# Patient Record
Sex: MALE | Race: WHITE | NOT HISPANIC OR LATINO | URBAN - METROPOLITAN AREA
[De-identification: names, ages, dates, MRNs, and addresses within clinical notes are randomized per-mention and may not be internally consistent; named-entity substitution may affect disease eponyms.]

---

## 2017-11-13 ENCOUNTER — OUTPATIENT (OUTPATIENT)
Dept: OUTPATIENT SERVICES | Facility: HOSPITAL | Age: 72
LOS: 1 days | Discharge: HOME | End: 2017-11-13

## 2017-11-13 DIAGNOSIS — E78.00 PURE HYPERCHOLESTEROLEMIA, UNSPECIFIED: ICD-10-CM

## 2017-11-13 DIAGNOSIS — I25.10 ATHEROSCLEROTIC HEART DISEASE OF NATIVE CORONARY ARTERY WITHOUT ANGINA PECTORIS: ICD-10-CM

## 2017-11-13 DIAGNOSIS — E11.21 TYPE 2 DIABETES MELLITUS WITH DIABETIC NEPHROPATHY: ICD-10-CM

## 2017-11-13 DIAGNOSIS — E03.9 HYPOTHYROIDISM, UNSPECIFIED: ICD-10-CM

## 2017-11-13 DIAGNOSIS — K21.9 GASTRO-ESOPHAGEAL REFLUX DISEASE WITHOUT ESOPHAGITIS: ICD-10-CM

## 2017-11-13 DIAGNOSIS — E66.8 OTHER OBESITY: ICD-10-CM

## 2017-11-13 DIAGNOSIS — E78.5 HYPERLIPIDEMIA, UNSPECIFIED: ICD-10-CM

## 2017-11-13 DIAGNOSIS — N40.0 BENIGN PROSTATIC HYPERPLASIA WITHOUT LOWER URINARY TRACT SYMPTOMS: ICD-10-CM

## 2017-11-13 DIAGNOSIS — E55.9 VITAMIN D DEFICIENCY, UNSPECIFIED: ICD-10-CM

## 2017-11-13 DIAGNOSIS — E53.8 DEFICIENCY OF OTHER SPECIFIED B GROUP VITAMINS: ICD-10-CM

## 2017-11-13 DIAGNOSIS — I10 ESSENTIAL (PRIMARY) HYPERTENSION: ICD-10-CM

## 2017-11-13 DIAGNOSIS — E11.9 TYPE 2 DIABETES MELLITUS WITHOUT COMPLICATIONS: ICD-10-CM

## 2017-11-13 DIAGNOSIS — Z95.810 PRESENCE OF AUTOMATIC (IMPLANTABLE) CARDIAC DEFIBRILLATOR: ICD-10-CM

## 2017-11-13 DIAGNOSIS — N18.3 CHRONIC KIDNEY DISEASE, STAGE 3 (MODERATE): ICD-10-CM

## 2017-11-16 ENCOUNTER — APPOINTMENT (OUTPATIENT)
Dept: CARDIOLOGY | Facility: CLINIC | Age: 72
End: 2017-11-16

## 2017-11-16 VITALS — DIASTOLIC BLOOD PRESSURE: 70 MMHG | BODY MASS INDEX: 36.81 KG/M2 | SYSTOLIC BLOOD PRESSURE: 120 MMHG | WEIGHT: 235 LBS

## 2018-05-17 ENCOUNTER — APPOINTMENT (OUTPATIENT)
Dept: CARDIOLOGY | Facility: CLINIC | Age: 73
End: 2018-05-17

## 2018-05-17 VITALS — WEIGHT: 232 LBS | BODY MASS INDEX: 36.34 KG/M2

## 2018-05-17 VITALS — SYSTOLIC BLOOD PRESSURE: 110 MMHG | DIASTOLIC BLOOD PRESSURE: 62 MMHG

## 2018-12-12 ENCOUNTER — OUTPATIENT (OUTPATIENT)
Dept: OUTPATIENT SERVICES | Facility: HOSPITAL | Age: 73
LOS: 1 days | Discharge: HOME | End: 2018-12-12

## 2018-12-13 ENCOUNTER — APPOINTMENT (OUTPATIENT)
Dept: CARDIOLOGY | Facility: CLINIC | Age: 73
End: 2018-12-13

## 2018-12-13 ENCOUNTER — OUTPATIENT (OUTPATIENT)
Dept: OUTPATIENT SERVICES | Facility: HOSPITAL | Age: 73
LOS: 1 days | Discharge: HOME | End: 2018-12-13

## 2018-12-13 VITALS — BODY MASS INDEX: 36.18 KG/M2 | SYSTOLIC BLOOD PRESSURE: 110 MMHG | DIASTOLIC BLOOD PRESSURE: 68 MMHG | WEIGHT: 231 LBS

## 2018-12-13 DIAGNOSIS — E11.9 TYPE 2 DIABETES MELLITUS WITHOUT COMPLICATIONS: ICD-10-CM

## 2018-12-13 DIAGNOSIS — I25.700 ATHEROSCLEROSIS OF CORONARY ARTERY BYPASS GRAFT(S), UNSPECIFIED, WITH UNSTABLE ANGINA PECTORIS: ICD-10-CM

## 2018-12-13 DIAGNOSIS — I10 ESSENTIAL (PRIMARY) HYPERTENSION: ICD-10-CM

## 2018-12-13 DIAGNOSIS — N18.3 CHRONIC KIDNEY DISEASE, STAGE 3 (MODERATE): ICD-10-CM

## 2018-12-13 NOTE — PHYSICAL EXAM
[General Appearance - Well Developed] : well developed [Normal Appearance] : normal appearance [Well Groomed] : well groomed [General Appearance - Well Nourished] : well nourished [No Deformities] : no deformities [General Appearance - In No Acute Distress] : no acute distress [Heart Rate And Rhythm] : heart rate and rhythm were normal [Heart Sounds] : normal S1 and S2 [Murmurs] : no murmurs present [Respiration, Rhythm And Depth] : normal respiratory rhythm and effort [Exaggerated Use Of Accessory Muscles For Inspiration] : no accessory muscle use [Auscultation Breath Sounds / Voice Sounds] : lungs were clear to auscultation bilaterally [Clean] : clean [Dry] : dry [Well-Healed] : well-healed [Abdomen Soft] : soft [Abdomen Tenderness] : non-tender [Abdomen Mass (___ Cm)] : no abdominal mass palpated [Nail Clubbing] : no clubbing of the fingernails [Cyanosis, Localized] : no localized cyanosis [Petechial Hemorrhages (___cm)] : no petechial hemorrhages [] : no ischemic changes

## 2018-12-13 NOTE — HISTORY OF PRESENT ILLNESS
[None] : The patient complains of no symptoms [Palpitations] : no palpitations [SOB] : no dyspnea [Syncope] : no syncope [Dizziness] : no dizziness [Chest Pain] : no chest pain or discomfort [ICD Shocks] : no recent ICD shocks [Erythema at Site] : no erythema at device site [Swelling at Site] : no swelling at device site [de-identified] : Pt complaining of still SOB despite BiV -ICD. Has occasional angina

## 2018-12-13 NOTE — PROCEDURE
[No] : not [NSR] : normal sinus rhythm [CRT-D] : Cardiac resynchronization therapy defibrillator [DDD] : DDD [Charge Time: ___ sec] : charge time was [unfilled] seconds [Longevity: ___ months] : The estimated remaining battery life is [unfilled] months [Threshold Testing Performed] : Threshold testing was performed [Sensing Amplitude ___mv] : sensing amplitude was [unfilled] mv [Lead Imp:  ___ohms] : lead impedance was [unfilled] ohms [___V @] : [unfilled] V [___ ms] : [unfilled] ms [Programmed for Longevity] : output reprogrammed for improved battery longevity [Asense-Vsense ___ %] : Asense-Vsense [unfilled]% [Asense-Vpace ___ %] : Asense-Vpace [unfilled]% [Apace-Vsense ___ %] : Apace-Vsense [unfilled]% [Apace-Vpace ___ %] : Apace-Vpace [unfilled]% [de-identified] : ABBOTT [de-identified] : [id number] [de-identified] : [id number] [de-identified] : 6/02/2016 [de-identified] : 61 [de-identified] : NORMAL BI-V FUNCTION.\par NO NEW EOISODES.

## 2019-01-14 DIAGNOSIS — N39.0 URINARY TRACT INFECTION, SITE NOT SPECIFIED: ICD-10-CM

## 2019-01-14 DIAGNOSIS — E11.9 TYPE 2 DIABETES MELLITUS WITHOUT COMPLICATIONS: ICD-10-CM

## 2019-04-09 ENCOUNTER — APPOINTMENT (OUTPATIENT)
Dept: CARDIOLOGY | Facility: CLINIC | Age: 74
End: 2019-04-09
Payer: MEDICARE

## 2019-04-09 PROCEDURE — 93296 REM INTERROG EVL PM/IDS: CPT

## 2019-04-09 PROCEDURE — 93295 DEV INTERROG REMOTE 1/2/MLT: CPT

## 2019-07-10 ENCOUNTER — APPOINTMENT (OUTPATIENT)
Dept: CARDIOLOGY | Facility: CLINIC | Age: 74
End: 2019-07-10
Payer: MEDICARE

## 2019-07-10 PROCEDURE — 93296 REM INTERROG EVL PM/IDS: CPT

## 2019-07-10 PROCEDURE — 93295 DEV INTERROG REMOTE 1/2/MLT: CPT

## 2019-10-08 ENCOUNTER — APPOINTMENT (OUTPATIENT)
Dept: CARDIOLOGY | Facility: CLINIC | Age: 74
End: 2019-10-08
Payer: MEDICARE

## 2019-10-08 PROCEDURE — 93295 DEV INTERROG REMOTE 1/2/MLT: CPT

## 2019-10-08 PROCEDURE — 93296 REM INTERROG EVL PM/IDS: CPT

## 2019-10-24 ENCOUNTER — APPOINTMENT (OUTPATIENT)
Dept: CARDIOLOGY | Facility: CLINIC | Age: 74
End: 2019-10-24
Payer: MEDICARE

## 2019-10-24 VITALS
BODY MASS INDEX: 36.81 KG/M2 | WEIGHT: 235 LBS | HEART RATE: 71 BPM | DIASTOLIC BLOOD PRESSURE: 69 MMHG | SYSTOLIC BLOOD PRESSURE: 122 MMHG

## 2019-10-24 DIAGNOSIS — E78.00 PURE HYPERCHOLESTEROLEMIA, UNSPECIFIED: ICD-10-CM

## 2019-10-24 PROCEDURE — 93284 PRGRMG EVAL IMPLANTABLE DFB: CPT

## 2019-10-24 PROCEDURE — 99213 OFFICE O/P EST LOW 20 MIN: CPT | Mod: 25

## 2019-10-24 NOTE — PROCEDURE
[No] : not [NSR] : normal sinus rhythm [CRT-D] : Cardiac resynchronization therapy defibrillator [DDD] : DDD [Voltage: ___ volts] : Voltage was [unfilled] volts [Charge Time: ___ sec] : charge time was [unfilled] seconds [Longevity: ___ months] : The estimated remaining battery life is [unfilled] months [Normal] : The battery status is normal. [Threshold Testing Performed] : Threshold testing was performed [Atrial] : Atrial [Ventricular] : Ventricular [Sensing Amplitude ___mv] : sensing amplitude was [unfilled] mv [Lead Imp:  ___ohms] : lead impedance was [unfilled] ohms [___V @] : [unfilled] V [___ ms] : [unfilled] ms [Asense-Vsense ___ %] : Asense-Vsense [unfilled]% [Asense-Vpace ___ %] : Asense-Vpace [unfilled]% [Apace-Vsense ___ %] : Apace-Vsense [unfilled]% [Apace-Vpace ___ %] : Apace-Vpace [unfilled]% [See Scanned Paceart Report] : See scanned paceart report [See Device Printout] : See device printout [Programmed for Longevity] : output reprogrammed for improved battery longevity [de-identified] : ABBOTT / FAUSTINO [de-identified] : [id number] [de-identified] : [id number] [de-identified] : 6/2/2016 [de-identified] : ATRIAL TACHYCARDIA: 10 NON-SUSTAINED EPISODES LONGEST 8 SECONDS, FASTEST <200/MIN. \par NO VENTRICULAR ARRHYTHMIAS.  [de-identified] : 60

## 2019-10-24 NOTE — PHYSICAL EXAM
[General Appearance - Well Developed] : well developed [Normal Appearance] : normal appearance [General Appearance - Well Nourished] : well nourished [No Deformities] : no deformities [Heart Sounds] : normal S1 and S2 [Arterial Pulses Normal] : the arterial pulses were normal [Respiration, Rhythm And Depth] : normal respiratory rhythm and effort [Auscultation Breath Sounds / Voice Sounds] : lungs were clear to auscultation bilaterally [Left Infraclavicular] : left infraclavicular area [Well Groomed] : well groomed [General Appearance - In No Acute Distress] : no acute distress [Heart Rate And Rhythm] : heart rate and rhythm were normal [Murmurs] : no murmurs present [Exaggerated Use Of Accessory Muscles For Inspiration] : no accessory muscle use [Clean] : clean [Dry] : dry [Well-Healed] : well-healed [Abdomen Soft] : soft [Abdomen Tenderness] : non-tender [Abdomen Mass (___ Cm)] : no abdominal mass palpated [Nail Clubbing] : no clubbing of the fingernails [Cyanosis, Localized] : no localized cyanosis [Petechial Hemorrhages (___cm)] : no petechial hemorrhages [] : no ischemic changes

## 2019-10-24 NOTE — HISTORY OF PRESENT ILLNESS
[Palpitations] : no palpitations [SOB] : no dyspnea [Syncope] : no syncope [Dizziness] : no dizziness [Chest Pain] : no chest pain or discomfort [ICD Shocks] : no recent ICD shocks [Erythema at Site] : no erythema at device site [Swelling at Site] : no swelling at device site [de-identified] : 73 y/o male with a CRT- D presents for a routine device interrogation. \par \par Pt reports intermittent MEEHAN , activity limited by left knee pain.  Has occasional angina . Occasional dizziness , no syncope . \par \par ECG (10/24/2019) SR at 71 bpm, BiV paced .

## 2019-10-24 NOTE — ASSESSMENT
[FreeTextEntry1] : 73 y/o male with pmh of CAD, s/p MI x 3 , s/p CABG , ICM/ CHF  s/p BiV AICD 2016 , DM , PAF \par \par On Eliquis for CVA prevention , no s/s bleeding reported . \par \par AICD interrogation : All parameters stable , Brief AT/ Afib episodes , BiV paced >99% . Corvue index- no pulmonary congestion \par \par He is enrolled in remote monitoring services . \par \par Plan \par -Continue current medication regimen \par -Continue remote monitoring \par -RTO in 6 months \par -Obtain labs : CBC, BMP \par

## 2020-01-24 ENCOUNTER — APPOINTMENT (OUTPATIENT)
Dept: CARDIOLOGY | Facility: CLINIC | Age: 75
End: 2020-01-24
Payer: MEDICARE

## 2020-01-24 PROCEDURE — 93295 DEV INTERROG REMOTE 1/2/MLT: CPT

## 2020-01-24 PROCEDURE — 93296 REM INTERROG EVL PM/IDS: CPT

## 2020-07-24 ENCOUNTER — APPOINTMENT (OUTPATIENT)
Dept: ELECTROPHYSIOLOGY | Facility: CLINIC | Age: 75
End: 2020-07-24

## 2020-07-24 ENCOUNTER — APPOINTMENT (OUTPATIENT)
Dept: CARDIOLOGY | Facility: CLINIC | Age: 75
End: 2020-07-24
Payer: MEDICARE

## 2020-07-24 PROCEDURE — 93296 REM INTERROG EVL PM/IDS: CPT

## 2020-07-24 PROCEDURE — 93295 DEV INTERROG REMOTE 1/2/MLT: CPT

## 2020-09-08 ENCOUNTER — INPATIENT (INPATIENT)
Facility: HOSPITAL | Age: 75
LOS: 1 days | Discharge: HOME | End: 2020-09-10
Attending: INTERNAL MEDICINE | Admitting: INTERNAL MEDICINE
Payer: MEDICARE

## 2020-09-08 VITALS
HEART RATE: 77 BPM | SYSTOLIC BLOOD PRESSURE: 172 MMHG | RESPIRATION RATE: 18 BRPM | TEMPERATURE: 98 F | DIASTOLIC BLOOD PRESSURE: 79 MMHG

## 2020-09-08 DIAGNOSIS — Z95.1 PRESENCE OF AORTOCORONARY BYPASS GRAFT: Chronic | ICD-10-CM

## 2020-09-08 DIAGNOSIS — Z95.810 PRESENCE OF AUTOMATIC (IMPLANTABLE) CARDIAC DEFIBRILLATOR: Chronic | ICD-10-CM

## 2020-09-08 DIAGNOSIS — Z95.0 PRESENCE OF CARDIAC PACEMAKER: Chronic | ICD-10-CM

## 2020-09-08 LAB
ALBUMIN SERPL ELPH-MCNC: 3.2 G/DL — LOW (ref 3.5–5.2)
ALP SERPL-CCNC: 42 U/L — SIGNIFICANT CHANGE UP (ref 30–115)
ALT FLD-CCNC: 29 U/L — SIGNIFICANT CHANGE UP (ref 0–41)
ANION GAP SERPL CALC-SCNC: 13 MMOL/L — SIGNIFICANT CHANGE UP (ref 7–14)
AST SERPL-CCNC: 60 U/L — HIGH (ref 0–41)
BASOPHILS # BLD AUTO: 0.02 K/UL — SIGNIFICANT CHANGE UP (ref 0–0.2)
BASOPHILS NFR BLD AUTO: 0.2 % — SIGNIFICANT CHANGE UP (ref 0–1)
BILIRUB SERPL-MCNC: 0.4 MG/DL — SIGNIFICANT CHANGE UP (ref 0.2–1.2)
BUN SERPL-MCNC: 35 MG/DL — HIGH (ref 10–20)
CALCIUM SERPL-MCNC: 8.9 MG/DL — SIGNIFICANT CHANGE UP (ref 8.5–10.1)
CHLORIDE SERPL-SCNC: 107 MMOL/L — SIGNIFICANT CHANGE UP (ref 98–110)
CO2 SERPL-SCNC: 20 MMOL/L — SIGNIFICANT CHANGE UP (ref 17–32)
CREAT SERPL-MCNC: 1.7 MG/DL — HIGH (ref 0.7–1.5)
EOSINOPHIL # BLD AUTO: 0.23 K/UL — SIGNIFICANT CHANGE UP (ref 0–0.7)
EOSINOPHIL NFR BLD AUTO: 2.9 % — SIGNIFICANT CHANGE UP (ref 0–8)
GLUCOSE BLDC GLUCOMTR-MCNC: 139 MG/DL — HIGH (ref 70–99)
GLUCOSE SERPL-MCNC: 132 MG/DL — HIGH (ref 70–99)
HCT VFR BLD CALC: 41.7 % — LOW (ref 42–52)
HGB BLD-MCNC: 13.2 G/DL — LOW (ref 14–18)
IMM GRANULOCYTES NFR BLD AUTO: 0.7 % — HIGH (ref 0.1–0.3)
LYMPHOCYTES # BLD AUTO: 1.43 K/UL — SIGNIFICANT CHANGE UP (ref 1.2–3.4)
LYMPHOCYTES # BLD AUTO: 17.7 % — LOW (ref 20.5–51.1)
MAGNESIUM SERPL-MCNC: 2 MG/DL — SIGNIFICANT CHANGE UP (ref 1.8–2.4)
MCHC RBC-ENTMCNC: 27.9 PG — SIGNIFICANT CHANGE UP (ref 27–31)
MCHC RBC-ENTMCNC: 31.7 G/DL — LOW (ref 32–37)
MCV RBC AUTO: 88.2 FL — SIGNIFICANT CHANGE UP (ref 80–94)
MONOCYTES # BLD AUTO: 0.66 K/UL — HIGH (ref 0.1–0.6)
MONOCYTES NFR BLD AUTO: 8.2 % — SIGNIFICANT CHANGE UP (ref 1.7–9.3)
NEUTROPHILS # BLD AUTO: 5.66 K/UL — SIGNIFICANT CHANGE UP (ref 1.4–6.5)
NEUTROPHILS NFR BLD AUTO: 70.3 % — SIGNIFICANT CHANGE UP (ref 42.2–75.2)
NRBC # BLD: 0 /100 WBCS — SIGNIFICANT CHANGE UP (ref 0–0)
NT-PROBNP SERPL-SCNC: HIGH PG/ML (ref 0–300)
PLATELET # BLD AUTO: 196 K/UL — SIGNIFICANT CHANGE UP (ref 130–400)
POTASSIUM SERPL-MCNC: 5.3 MMOL/L — HIGH (ref 3.5–5)
POTASSIUM SERPL-SCNC: 5.3 MMOL/L — HIGH (ref 3.5–5)
PROT SERPL-MCNC: 6.7 G/DL — SIGNIFICANT CHANGE UP (ref 6–8)
RBC # BLD: 4.73 M/UL — SIGNIFICANT CHANGE UP (ref 4.7–6.1)
RBC # FLD: 15.8 % — HIGH (ref 11.5–14.5)
SODIUM SERPL-SCNC: 140 MMOL/L — SIGNIFICANT CHANGE UP (ref 135–146)
TROPONIN T SERPL-MCNC: 0.01 NG/ML — SIGNIFICANT CHANGE UP
WBC # BLD: 8.06 K/UL — SIGNIFICANT CHANGE UP (ref 4.8–10.8)
WBC # FLD AUTO: 8.06 K/UL — SIGNIFICANT CHANGE UP (ref 4.8–10.8)

## 2020-09-08 PROCEDURE — 93010 ELECTROCARDIOGRAM REPORT: CPT

## 2020-09-08 PROCEDURE — 36000 PLACE NEEDLE IN VEIN: CPT

## 2020-09-08 PROCEDURE — 71045 X-RAY EXAM CHEST 1 VIEW: CPT | Mod: 26

## 2020-09-08 PROCEDURE — 99285 EMERGENCY DEPT VISIT HI MDM: CPT | Mod: CS,25,GC

## 2020-09-08 PROCEDURE — 99223 1ST HOSP IP/OBS HIGH 75: CPT

## 2020-09-08 RX ORDER — SACUBITRIL AND VALSARTAN 24; 26 MG/1; MG/1
1 TABLET, FILM COATED ORAL
Refills: 0 | Status: DISCONTINUED | OUTPATIENT
Start: 2020-09-08 | End: 2020-09-10

## 2020-09-08 RX ORDER — APIXABAN 2.5 MG/1
2.5 TABLET, FILM COATED ORAL
Refills: 0 | Status: DISCONTINUED | OUTPATIENT
Start: 2020-09-08 | End: 2020-09-10

## 2020-09-08 RX ORDER — METOPROLOL TARTRATE 50 MG
50 TABLET ORAL AT BEDTIME
Refills: 0 | Status: DISCONTINUED | OUTPATIENT
Start: 2020-09-08 | End: 2020-09-10

## 2020-09-08 RX ORDER — ISOSORBIDE DINITRATE 5 MG/1
30 TABLET ORAL THREE TIMES A DAY
Refills: 0 | Status: DISCONTINUED | OUTPATIENT
Start: 2020-09-08 | End: 2020-09-08

## 2020-09-08 RX ORDER — ASPIRIN/CALCIUM CARB/MAGNESIUM 324 MG
81 TABLET ORAL DAILY
Refills: 0 | Status: DISCONTINUED | OUTPATIENT
Start: 2020-09-08 | End: 2020-09-10

## 2020-09-08 RX ORDER — FUROSEMIDE 40 MG
40 TABLET ORAL ONCE
Refills: 0 | Status: COMPLETED | OUTPATIENT
Start: 2020-09-08 | End: 2020-09-08

## 2020-09-08 RX ORDER — PANTOPRAZOLE SODIUM 20 MG/1
40 TABLET, DELAYED RELEASE ORAL
Refills: 0 | Status: DISCONTINUED | OUTPATIENT
Start: 2020-09-08 | End: 2020-09-10

## 2020-09-08 RX ORDER — FUROSEMIDE 40 MG
40 TABLET ORAL EVERY 12 HOURS
Refills: 0 | Status: DISCONTINUED | OUTPATIENT
Start: 2020-09-08 | End: 2020-09-10

## 2020-09-08 RX ORDER — TAMSULOSIN HYDROCHLORIDE 0.4 MG/1
0.4 CAPSULE ORAL AT BEDTIME
Refills: 0 | Status: DISCONTINUED | OUTPATIENT
Start: 2020-09-08 | End: 2020-09-10

## 2020-09-08 RX ORDER — APIXABAN 2.5 MG/1
0 TABLET, FILM COATED ORAL
Qty: 0 | Refills: 0 | DISCHARGE

## 2020-09-08 RX ORDER — ISOSORBIDE MONONITRATE 60 MG/1
30 TABLET, EXTENDED RELEASE ORAL DAILY
Refills: 0 | Status: DISCONTINUED | OUTPATIENT
Start: 2020-09-08 | End: 2020-09-10

## 2020-09-08 RX ORDER — METOPROLOL TARTRATE 50 MG
25 TABLET ORAL DAILY
Refills: 0 | Status: DISCONTINUED | OUTPATIENT
Start: 2020-09-08 | End: 2020-09-10

## 2020-09-08 RX ORDER — ISOSORBIDE DINITRATE 5 MG/1
30 TABLET ORAL DAILY
Refills: 0 | Status: DISCONTINUED | OUTPATIENT
Start: 2020-09-08 | End: 2020-09-08

## 2020-09-08 RX ORDER — LEVOTHYROXINE SODIUM 125 MCG
200 TABLET ORAL DAILY
Refills: 0 | Status: DISCONTINUED | OUTPATIENT
Start: 2020-09-08 | End: 2020-09-10

## 2020-09-08 RX ORDER — RANOLAZINE 500 MG/1
500 TABLET, FILM COATED, EXTENDED RELEASE ORAL
Refills: 0 | Status: DISCONTINUED | OUTPATIENT
Start: 2020-09-08 | End: 2020-09-10

## 2020-09-08 RX ORDER — ATORVASTATIN CALCIUM 80 MG/1
40 TABLET, FILM COATED ORAL DAILY
Refills: 0 | Status: DISCONTINUED | OUTPATIENT
Start: 2020-09-08 | End: 2020-09-10

## 2020-09-08 RX ADMIN — ATORVASTATIN CALCIUM 40 MILLIGRAM(S): 80 TABLET, FILM COATED ORAL at 22:21

## 2020-09-08 RX ADMIN — SACUBITRIL AND VALSARTAN 1 TABLET(S): 24; 26 TABLET, FILM COATED ORAL at 22:21

## 2020-09-08 RX ADMIN — TAMSULOSIN HYDROCHLORIDE 0.4 MILLIGRAM(S): 0.4 CAPSULE ORAL at 22:21

## 2020-09-08 RX ADMIN — Medication 50 MILLIGRAM(S): at 22:20

## 2020-09-08 RX ADMIN — Medication 40 MILLIGRAM(S): at 16:17

## 2020-09-08 RX ADMIN — RANOLAZINE 500 MILLIGRAM(S): 500 TABLET, FILM COATED, EXTENDED RELEASE ORAL at 22:21

## 2020-09-08 RX ADMIN — Medication 40 MILLIGRAM(S): at 22:21

## 2020-09-08 NOTE — H&P ADULT - ASSESSMENT
This is a 75 year old M patient with past medical history of HFrEF last echo showed and EF of 26% according to the wife s/p AICD, CAD s/p CABG in 2016, Bullous pemphigoid s/p one dose of IVIG, Present to the ED for shortness of breath and chest discomfort of one day duration This is a 75 year old M patient with past medical history of HFrEF last echo showed and EF of 26% according to the wife s/p AICD, CAD s/p CABG in 2016, Bullous pemphigoid s/p one dose of IVIG, Present to the ED for shortness of breath and chest discomfort of one day duration.    #Acute on Chronic heart failure exacerbation   -Patient presented with Shortness of breath along with chest discomfort after receiving IVIG.   -On admission BNP was 11K, He has bilateral lower extremities edema which is worse that his usual according to the Wife   -Chest Xray did not show any large effusion, Some congestion was noted, Pending official read    -Other DDx: Pneumonia vs ACS.   -Patient has a history of CABG, First set of troponin is negative, trend CE   -Patient has no fever, Leukocyte counts which makes pneumonia unlikely as a diagnosis   -Patient follows with Dr. Godinez   -get an Echo given the change in status   -Continue with his home meds, Entresto, Metoprolol and Lasix  -Keep I<O  -Daily weight, Fluid restriction to 1.5 L     #Biventricular ICD   -EP consult for interrogation   -Patient EKG showed a paced rhythm   -Patient is on Eliquis 2.5 mg BID    #Bullous pemphigoid  -O/P derm Follow up     #DM   -Patient is on Lantus and oral Medications   -Stop oral meds and start insulin if BS>180    #OVI vs CKD ?  -Cr is 1.7, There is no baseline   -Trend Cr     #BPH   -Continue with Flomax     #DVT ppx: Eliquis   #GI ppx: Protonix   #Diet: DASH with fluid restrictions   #Acute

## 2020-09-08 NOTE — H&P ADULT - HISTORY OF PRESENT ILLNESS
This is a 75 year old M patient with past medical history of HFrEF last echo showed and EF of 26% according to the wife s/p ICD, CAD s/p CABG in 2016, Bullous pemphigoid s/p one dose of IVIG, Present to the ED for shortness of breath and chest discomfort of one day duration. history goes back to one day prior to admission when he was receiving his first dose of IVIG, patient went home after that and he started experiencing shortness of breath that was exacerbated by ambulation, orthopnea, and chest discomfort that was described as heaviness, was localized and was not associated with nausea, vomiting or diaphoresis.     In the ED Vital Signs Last 24 Hrs  T(C): 36.6 (08 Sep 2020 15:56), Max: 36.6 (08 Sep 2020 13:37)  T(F): 97.9 (08 Sep 2020 15:56), Max: 97.9 (08 Sep 2020 13:37)  HR: 78 (08 Sep 2020 15:56) (77 - 78)  BP: 155/84 (08 Sep 2020 15:56) (155/84 - 172/79)  RR: 18 (08 Sep 2020 15:56) (18 - 18)  SpO2: 96% (08 Sep 2020 15:56) (96% - 96%)

## 2020-09-08 NOTE — PROCEDURE NOTE - NSURITECHNIQUE_GU_A_CORE
Sterile gloves were worn for the duration of the procedure/The catheter was appropriately lubricated/Proper hand hygiene was performed/The catheter was secured with a securement device (e.g. StatLock)/The collection bag is below the level of the patient and urinary bladder The catheter was secured with a securement device (e.g. StatLock)/The collection bag is below the level of the patient and urinary bladder/Proper hand hygiene was performed/Sterile gloves were worn for the duration of the procedure/The catheter was appropriately lubricated/The site was cleaned with soap/water and sterile solution (betadine)

## 2020-09-08 NOTE — ED ADULT NURSE REASSESSMENT NOTE - NS ED NURSE REASSESS COMMENT FT1
pt. BIBA c'o mid-sternal chest pain non-radiating with shortness of breath . pt. alert and oriented times four. VSS. Pt. received one nitro spray PTA partial relief maintained. labs drawn and sent via US. pt. placed on continuos cardiac monitoring SR 77. EKG performed evaluated by MD. pt. given lasix IV as ordered BNP over 11,000. 600 cc output noted. Spouse at bedside . awaiting urology for Orta catheter placement. will monitor.

## 2020-09-08 NOTE — ED PROVIDER NOTE - PHYSICAL EXAMINATION
CONSTITUTIONAL: obese male laying in stretcher appears uncomfortable.   SKIN: warm, dry  HEAD: Normocephalic; atraumatic.  EYES: normal sclera and conjunctiva   ENT: No nasal discharge; airway clear.  NECK: Supple; non tender.  CARD: S1, S2 normal; no murmurs, gallops, or rubs. Regular rate and rhythm.   RESP: mild crackles b/l. no wheezing.   ABD: soft ntnd  EXT: Normal ROM.  b/l le edema.   LYMPH: No acute cervical adenopathy.  NEURO: Alert, oriented, grossly unremarkable  PSYCH: Cooperative, appropriate.

## 2020-09-08 NOTE — ED PROVIDER NOTE - PROGRESS NOTE DETAILS
Authored by Dr. Arizmendi: difficult iv access. Authored by Dr. Arizmendi: s/o to dr tavarez - f/u labs, imaging, communication with card and dispo ED Attending HARRISON Duque  Discussion had with Dr. Porter reports pt has hx of ischemic cardiomyopathy, EF 25-30 percwent, AICD, CKD, DM, called and told him he had chest pain, on Elqquis, would like ACS work up and including chf work up, lasix if needed, will follow . pt aware, will continue to monitor and reassess. TA: Spoke with patient's cardiologist Dr. Gretchen Tellez; stated likely CHF exacerbation after IVIG; recommended diuresis and admission to floor. TA: Patient received 40 lasix; pt makes urine but requested carter due to bladder discomfort; RN unable to place carter likely due to enlarged prostate. Called urology PA who will attempt carter placement in ED.

## 2020-09-08 NOTE — H&P ADULT - ATTENDING COMMENTS
I saw and evaluated the patient. I have reviewed and agree with the findings and plan of care as documented above in the resident’s note (unless indicated differently below). Any necessary changes were made in the body of the text. I saw and evaluated the patient on 09/08/2020. I have reviewed and agree with the findings and plan of care as documented above in the resident’s note (unless indicated differently below). Any necessary changes were made in the body of the text.    74 yo M pt w/ a relevant hx of HFrEF and CABG (in 2016). P/w SOB (alleviated by rest and primarily exertional) and chest pain. Ongoing x 1 day. Symptoms started at home - after pt returned from an IVIG treatment for bullous pemphigoid. Chest pain: heavy, middle/left sided, non-radiating, moderate brought on and worsened by exertion. Pain alleviated by rest. Assoc w/ generalized weakness. At the time of this evaluation, pt said that his symptoms had improved but still reported getting short of breath and having some discomfort when ambulating to the restroom. Walks with a cane. Denies other concerns / complaints.    ROS:  Constitutional: no fever; no chills  Eyes: no vision changes  ENT: no dysphagia  CVS: +orthopnea; +chest pain  Resp: +SOB; no coughing  GI: no vomiting; no diarrhea; no abd pain  : no dysuria  MSK: no pain  Skin: no rashes  Neuro: no headache  All other systems reviewed and are negative    PMHx, home medications, SurHx, FHx and Social history as above in the corresponding sections of the note - reviewed and edited where appropriate    Exam:  Vitals: BP = 149/85; P = 69; T = 98; RR = 20; SpO2 > 95 on 2 L/min via NC  General: appears stated age; cooperative  Eyes: anicteric sclera; moist conjunctiva; PERRL; EOMI  HENT: NC/AT; clear oropharynx w/ moist mucous membranes  Neck: supple w/ FROM; trachea midline  Lungs: mild tachypnea; mild distress; no accessory muscle usage; no wheezing; no rhonci; +dependent rales  CVS: RRR; S1 and S2 w/o MRGs  Abd: BS+; soft; non-tender to palpation x 4; no masses or HSM  Ext: 2+ pitting edema above the ankle over anterior tibial surface - extending to below the knee b/l; pulses palpable  Skin: normal temp, turgor and texture; no rashes, ulcers or nodules  Neuro: CN II-XII intact; able to move all extremities  Psych: appropriate affect; alert and oriented to person, place and situation    Labs significant for H&H 13.2/41.7, K 5.3 (h), BUN/Cr 35/1.7, gluc 132, AST 60, proBNP 12k  CXR: mild increased prominence of vasc (congestion); left basilar linear opacity   EKG reviewed: paced rhythm; T wave changes - inferior leads    Assessment:  (1) Acute on chronic HFrEF  (2) Possible OVI on possible underlying CKD 3 (unknown baseline)  (3) DM w/ hyperglycemia  (4) Hx of CAD (s/p CABG)  (5) Hx of bullous pemphigoid (recent tx of IVIG before symptoms started)  ---  SOB/chest pain relatively infrequent side effects of IVIG (less likely etio)    Plan:  (1) Strict I&O; daily wt; TTE; diuresis (goal net neg 1.5 L daily)  (2) Trend BUN/Cr; send U/A, protein:Cr, urine urea (calc Feurea)  (3) Insulin to attain target glycemic control (random BG < 180 mg/dL)  (4) Hypoglycemia protocol PRN  (5) Medications as dosed  (6) Supportive care   (7) D/c planning: anticipate d/c in 48-72 hrs  --- Pending aforementioned w/u and tx    Code status: full code

## 2020-09-08 NOTE — ED PROVIDER NOTE - OBJECTIVE STATEMENT
The patient is a 75 year old male with a  history of CAD s/p CABG, PPM on eliquis, recent dx of bullous pemphigoid on IVIG, presenting to ED for shortness of breath and chest pain since yesterday morning. Per wife, patient had IVIG yesterday and began having acute onset sob and chest pain. Pt's wife called Dr. Charlotte Tellez who told them to go to ED for evaluation. No fevers, abdominal pain. +worsening edema b/l le.

## 2020-09-08 NOTE — ED ADULT TRIAGE NOTE - CHIEF COMPLAINT QUOTE
pt BIBA from home with complaint of chest pain x 2 weeks since he started a new medication given 1 nitro sublingual by ems

## 2020-09-08 NOTE — H&P ADULT - NSHPLABSRESULTS_GEN_ALL_CORE
13.2   8.06  )-----------( 196      ( 08 Sep 2020 14:09 )             41.7       09-08    140  |  107  |  35<H>  ----------------------------<  132<H>  5.3<H>   |  20  |  1.7<H>    Ca    8.9      08 Sep 2020 14:09  Mg     2.0     09-08    TPro  6.7  /  Alb  3.2<L>  /  TBili  0.4  /  DBili  x   /  AST  60<H>  /  ALT  29  /  AlkPhos  42  09-08                      Lactate Trend      CARDIAC MARKERS ( 08 Sep 2020 14:09 )  x     / 0.01 ng/mL / x     / x     / x            CAPILLARY BLOOD GLUCOSE

## 2020-09-08 NOTE — ED ADULT NURSE NOTE - NSIMPLEMENTINTERV_GEN_ALL_ED
Implemented All Universal Safety Interventions:  [place] to call system. Call bell, personal items and telephone within reach. Instruct patient to call for assistance. Room bathroom lighting operational. Non-slip footwear when patient is off stretcher. Physically safe environment: no spills, clutter or unnecessary equipment. Stretcher in lowest position, wheels locked, appropriate side rails in place.

## 2020-09-08 NOTE — ED ADULT NURSE NOTE - PMH
Coronary artery disease with other form of angina pectoris Coronary artery disease with other form of angina pectoris    Hypertension, unspecified type    Type 2 diabetes mellitus with other neurologic complication, with long-term current use of insulin Coronary artery disease with other form of angina pectoris    High cholesterol    Hypertension, unspecified type    Hypothyroidism, unspecified type    Type 2 diabetes mellitus with other neurologic complication, with long-term current use of insulin Coronary artery disease with other form of angina pectoris    High cholesterol    Hypertension, unspecified type    Hypothyroidism, unspecified type    Other congestive heart failure    Type 2 diabetes mellitus with other neurologic complication, with long-term current use of insulin

## 2020-09-08 NOTE — H&P ADULT - NSICDXPASTMEDICALHX_GEN_ALL_CORE_FT
PAST MEDICAL HISTORY:  Coronary artery disease with other form of angina pectoris     High cholesterol     Hypertension, unspecified type     Hypothyroidism, unspecified type     Type 2 diabetes mellitus with other neurologic complication, with long-term current use of insulin

## 2020-09-08 NOTE — H&P ADULT - NSHPPHYSICALEXAM_GEN_ALL_CORE
General: NAD, in bed, NC   Head and neck: No JVD visualized ( thick neck )  Heart and chest: Bilateral equal breath sounds, S1,S2 appreciated, no wheeze or crackles   Abdomen: Soft, nontender, nondistended, obese  LE: Bilateral lower extremities swelling +1 up to the knee  Psych: normal affect  Neuro: nonfocal

## 2020-09-08 NOTE — ED PROVIDER NOTE - ATTENDING CONTRIBUTION TO CARE
hx of cad, ppm, on eliquis, recent dx of pemphigoid bullous. started on IVIG, now with sob and cp, since the initiation of therapy 1 wk ago.  called dr borjas. told to come to ED for eval. no fever. no abd pain. + edema. treated with lasix by his wife.  vss, obese male, moaning, lying 30 degrees in bed, pink conj, anicteric, MMM, neck supple, dec BS, RRR, equal radial pulses bilat, abd soft/nt/nd, no cva tend. no calves tend, 2+ Bilat edema, no fnd. no rashes.  a/p: labs, imaging, reassess

## 2020-09-08 NOTE — ED PROVIDER NOTE - NS ED ROS FT
Eyes:  No visual changes, eye pain or discharge.  ENMT:  No hearing changes, pain, discharge or infections. No neck pain or stiffness.  Cardiac:  +chest pain, +SOB +edema.   Respiratory:  No cough or respiratory distress. No hemoptysis.   GI:  No nausea, vomiting, diarrhea or abdominal pain.  :  No dysuria, frequency or burning.  MS:  No myalgia, muscle weakness, joint pain or back pain.  Neuro:  No headache or weakness.  No LOC.  Skin:  No skin rash.   Endocrine: No history of thyroid disease or diabetes.

## 2020-09-08 NOTE — ED ADULT NURSE NOTE - PSH
Artificial cardiac pacemaker Artificial cardiac pacemaker    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    S/P CABG x 6

## 2020-09-08 NOTE — H&P ADULT - NSICDXPASTSURGICALHX_GEN_ALL_CORE_FT
PAST SURGICAL HISTORY:  Artificial cardiac pacemaker     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     S/P CABG x 6

## 2020-09-08 NOTE — ED PROVIDER NOTE - CLINICAL SUMMARY MEDICAL DECISION MAKING FREE TEXT BOX
pt presented with chest pain and sob s/p ivig, sent in by his cardiologist Dr. Godinez, concern for chf, troponin negative, bnp [id number], cxr congested, lasix given, pt feeling better at this time, admission to tele as discucssed with Dr. Godinez, medical admitting team aware of pt and admission.

## 2020-09-09 LAB
GLUCOSE BLDC GLUCOMTR-MCNC: 103 MG/DL — HIGH (ref 70–99)
GLUCOSE BLDC GLUCOMTR-MCNC: 120 MG/DL — HIGH (ref 70–99)
GLUCOSE BLDC GLUCOMTR-MCNC: 134 MG/DL — HIGH (ref 70–99)
GLUCOSE BLDC GLUCOMTR-MCNC: 157 MG/DL — HIGH (ref 70–99)
HCV AB S/CO SERPL IA: 0.06 COI — SIGNIFICANT CHANGE UP
HCV AB SERPL-IMP: SIGNIFICANT CHANGE UP
SARS-COV-2 RNA SPEC QL NAA+PROBE: SIGNIFICANT CHANGE UP

## 2020-09-09 PROCEDURE — 99233 SBSQ HOSP IP/OBS HIGH 50: CPT

## 2020-09-09 RX ORDER — INSULIN LISPRO 100/ML
6 VIAL (ML) SUBCUTANEOUS
Refills: 0 | Status: DISCONTINUED | OUTPATIENT
Start: 2020-09-09 | End: 2020-09-10

## 2020-09-09 RX ORDER — DEXTROSE 50 % IN WATER 50 %
12.5 SYRINGE (ML) INTRAVENOUS ONCE
Refills: 0 | Status: DISCONTINUED | OUTPATIENT
Start: 2020-09-09 | End: 2020-09-10

## 2020-09-09 RX ORDER — SODIUM CHLORIDE 9 MG/ML
1000 INJECTION, SOLUTION INTRAVENOUS
Refills: 0 | Status: DISCONTINUED | OUTPATIENT
Start: 2020-09-09 | End: 2020-09-10

## 2020-09-09 RX ORDER — DEXTROSE 50 % IN WATER 50 %
25 SYRINGE (ML) INTRAVENOUS ONCE
Refills: 0 | Status: DISCONTINUED | OUTPATIENT
Start: 2020-09-09 | End: 2020-09-10

## 2020-09-09 RX ORDER — DEXTROSE 50 % IN WATER 50 %
15 SYRINGE (ML) INTRAVENOUS ONCE
Refills: 0 | Status: DISCONTINUED | OUTPATIENT
Start: 2020-09-09 | End: 2020-09-10

## 2020-09-09 RX ORDER — MAGNESIUM SULFATE 500 MG/ML
2 VIAL (ML) INJECTION ONCE
Refills: 0 | Status: COMPLETED | OUTPATIENT
Start: 2020-09-09 | End: 2020-09-09

## 2020-09-09 RX ORDER — GLUCAGON INJECTION, SOLUTION 0.5 MG/.1ML
1 INJECTION, SOLUTION SUBCUTANEOUS ONCE
Refills: 0 | Status: DISCONTINUED | OUTPATIENT
Start: 2020-09-09 | End: 2020-09-10

## 2020-09-09 RX ORDER — INSULIN GLARGINE 100 [IU]/ML
20 INJECTION, SOLUTION SUBCUTANEOUS AT BEDTIME
Refills: 0 | Status: DISCONTINUED | OUTPATIENT
Start: 2020-09-09 | End: 2020-09-10

## 2020-09-09 RX ORDER — INSULIN LISPRO 100/ML
VIAL (ML) SUBCUTANEOUS
Refills: 0 | Status: DISCONTINUED | OUTPATIENT
Start: 2020-09-09 | End: 2020-09-10

## 2020-09-09 RX ORDER — INFLUENZA VIRUS VACCINE 15; 15; 15; 15 UG/.5ML; UG/.5ML; UG/.5ML; UG/.5ML
0.5 SUSPENSION INTRAMUSCULAR ONCE
Refills: 0 | Status: DISCONTINUED | OUTPATIENT
Start: 2020-09-09 | End: 2020-09-10

## 2020-09-09 RX ADMIN — RANOLAZINE 500 MILLIGRAM(S): 500 TABLET, FILM COATED, EXTENDED RELEASE ORAL at 05:43

## 2020-09-09 RX ADMIN — Medication 40 MILLIGRAM(S): at 17:23

## 2020-09-09 RX ADMIN — Medication 40 MILLIGRAM(S): at 05:21

## 2020-09-09 RX ADMIN — TAMSULOSIN HYDROCHLORIDE 0.4 MILLIGRAM(S): 0.4 CAPSULE ORAL at 21:50

## 2020-09-09 RX ADMIN — PANTOPRAZOLE SODIUM 40 MILLIGRAM(S): 20 TABLET, DELAYED RELEASE ORAL at 05:21

## 2020-09-09 RX ADMIN — SACUBITRIL AND VALSARTAN 1 TABLET(S): 24; 26 TABLET, FILM COATED ORAL at 17:22

## 2020-09-09 RX ADMIN — Medication 50 GRAM(S): at 13:07

## 2020-09-09 RX ADMIN — Medication 25 MILLIGRAM(S): at 05:21

## 2020-09-09 RX ADMIN — APIXABAN 2.5 MILLIGRAM(S): 2.5 TABLET, FILM COATED ORAL at 17:22

## 2020-09-09 RX ADMIN — Medication 81 MILLIGRAM(S): at 12:21

## 2020-09-09 RX ADMIN — Medication 50 MILLIGRAM(S): at 21:50

## 2020-09-09 RX ADMIN — INSULIN GLARGINE 20 UNIT(S): 100 INJECTION, SOLUTION SUBCUTANEOUS at 21:49

## 2020-09-09 RX ADMIN — Medication 1: at 12:19

## 2020-09-09 RX ADMIN — RANOLAZINE 500 MILLIGRAM(S): 500 TABLET, FILM COATED, EXTENDED RELEASE ORAL at 17:22

## 2020-09-09 RX ADMIN — SACUBITRIL AND VALSARTAN 1 TABLET(S): 24; 26 TABLET, FILM COATED ORAL at 05:43

## 2020-09-09 RX ADMIN — Medication 200 MICROGRAM(S): at 05:21

## 2020-09-09 RX ADMIN — ATORVASTATIN CALCIUM 40 MILLIGRAM(S): 80 TABLET, FILM COATED ORAL at 12:18

## 2020-09-09 RX ADMIN — Medication 6 UNIT(S): at 12:19

## 2020-09-09 RX ADMIN — ISOSORBIDE MONONITRATE 30 MILLIGRAM(S): 60 TABLET, EXTENDED RELEASE ORAL at 12:18

## 2020-09-09 RX ADMIN — Medication 6 UNIT(S): at 17:25

## 2020-09-09 RX ADMIN — APIXABAN 2.5 MILLIGRAM(S): 2.5 TABLET, FILM COATED ORAL at 05:21

## 2020-09-09 NOTE — CONSULT NOTE ADULT - SUBJECTIVE AND OBJECTIVE BOX
SUBJ:   Short of breath      MEDICATIONS  (STANDING):  apixaban 2.5 milliGRAM(s) Oral two times a day  aspirin  chewable 81 milliGRAM(s) Oral daily  atorvastatin Oral Tab/Cap - Peds 40 milliGRAM(s) Oral daily  dextrose 5%. 1000 milliLiter(s) (50 mL/Hr) IV Continuous <Continuous>  dextrose 50% Injectable 12.5 Gram(s) IV Push once  dextrose 50% Injectable 25 Gram(s) IV Push once  dextrose 50% Injectable 25 Gram(s) IV Push once  furosemide   Injectable 40 milliGRAM(s) IV Push every 12 hours  insulin glargine Injectable (LANTUS) 20 Unit(s) SubCutaneous at bedtime  insulin lispro (HumaLOG) corrective regimen sliding scale   SubCutaneous three times a day before meals  insulin lispro Injectable (HumaLOG) 6 Unit(s) SubCutaneous Before meals and at bedtime  isosorbide   mononitrate ER Tablet (IMDUR) 30 milliGRAM(s) Oral daily  levothyroxine 200 MICROGram(s) Oral daily  metoprolol tartrate Oral Tab/Cap - Peds 50 milliGRAM(s) Oral at bedtime  metoprolol tartrate Oral Tab/Cap - Peds 25 milliGRAM(s) Oral daily  pantoprazole    Tablet 40 milliGRAM(s) Oral before breakfast  ranolazine 500 milliGRAM(s) Oral two times a day  sacubitril 24 mG/valsartan 26 mG 1 Tablet(s) Oral two times a day  tamsulosin 0.4 milliGRAM(s) Oral at bedtime    MEDICATIONS  (PRN):  dextrose 40% Gel 15 Gram(s) Oral once PRN Blood Glucose LESS THAN 70 milliGRAM(s)/deciLiter  glucagon  Injectable 1 milliGRAM(s) IntraMuscular once PRN Glucose <70 milliGRAM(s)/deciLiter            Vital Signs Last 24 Hrs  T(C): 36.5 (09 Sep 2020 07:56), Max: 36.7 (08 Sep 2020 23:14)  T(F): 97.7 (09 Sep 2020 07:56), Max: 98 (08 Sep 2020 23:14)  HR: 60 (09 Sep 2020 07:56) (60 - 79)  BP: 128/71 (09 Sep 2020 07:56) (128/71 - 172/79)  BP(mean): --  RR: 20 (09 Sep 2020 07:56) (18 - 20)  SpO2: 98% (09 Sep 2020 07:56) (96% - 98%)     REVIEW OF SYSTEMS:  CONSTITUTIONAL: No fever, weight loss, or fatigue  CARDIOLOGY: PAtient denies chest pain, shortness of breath or syncopal episodes.   RESPIRATORY: denies shortness of breath, wheezeing.   NEUROLOGICAL: NO weakness, no focal deficits to report.  ENDOCRINOLOGICAL: no recent change in diabetic medications.   GI: no BRBPR, no N,V,diarrhea.    PSYCHIATRY: normal mood and affect  HEENT: no nasal discharge, no ecchymosis  SKIN: no ecchymosis, no breakdown  MUSCULOSKELETAL: Full range of motion x4.        PHYSICAL EXAM:  · CONSTITUTIONAL:	Well-developed, well nourished    BMI-  ·RESPIRATORY:   airway patent; breath sounds equal; good air movement; respirations non-labored; clear to auscultation bilaterally; no chest wall tenderness; no intercostal retractions; no rales,rhonchi or wheeze  · CARDIOVASCULAR	regular rate and rhythm  no rub  no murmur  normal PMI  · EXTREMITIES: No cyanosis, clubbing or edema  · VASCULAR: 	Equal and normal pulses (carotid, femoral, dorsalis pedis)  	  TELEMETRY:    ECG: paced    TTE:    LABS:                        13.2   8.06  )-----------( 196      ( 08 Sep 2020 14:09 )             41.7     09-08    140  |  107  |  35<H>  ----------------------------<  132<H>  5.3<H>   |  20  |  1.7<H>    Ca    8.9      08 Sep 2020 14:09  Mg     2.0     09-08    TPro  6.7  /  Alb  3.2<L>  /  TBili  0.4  /  DBili  x   /  AST  60<H>  /  ALT  29  /  AlkPhos  42  09-08    CARDIAC MARKERS ( 08 Sep 2020 14:09 )  x     / 0.01 ng/mL / x     / x     / x              I&O's Summary    08 Sep 2020 07:01  -  09 Sep 2020 07:00  --------------------------------------------------------  IN: 0 mL / OUT: 3200 mL / NET: -3200 mL      BNPSerum Pro-Brain Natriuretic Peptide: [id number] pg/mL (09-08 @ 14:09)    RADIOLOGY & ADDITIONAL STUDIES:    IMPRESSION AND PLAN:    HFrEF with acute exacerbation.  feels better after IV lasix  continue with Diuresis

## 2020-09-09 NOTE — PROGRESS NOTE ADULT - ATTENDING COMMENTS
Patient seen and examined independently. Agree with resident note/ history / physical exam and plan of care with following exceptions/additions/updates. Case discussed with patient/pt decision maker, house-staff and nursing.   # Acute fluid overload, poss chf, unknown baseline EF, check echo,   pt is feeling better, no sob.   dw cardiology, Dr Godinez, cont diuresis and poss dc in am   QTc 529,   check magnesium level,   add mag supplement   repeat ecg.   interogate aicd. Patient seen and examined independently. Agree with resident note/ history / physical exam and plan of care with following exceptions/additions/updates. Case discussed with patient/pt decision maker, house-staff and nursing.   # Acute fluid overload, poss chf, unknown baseline EF, check echo,   pt is feeling better, no sob.   dw cardiology, Dr Godinez, cont diuresis and poss dc in am   QTc 529,   check magnesium level,   add mag supplement   repeat ecg.   interogate aicd.    #abnl cr, poss ckd, repeat labs, check kidney and bladder us.   #pemphigus, outpt fu   #DM cont meds, insulin per protocol   #CAD, HTN, DL, cont meds

## 2020-09-09 NOTE — PROGRESS NOTE ADULT - SUBJECTIVE AND OBJECTIVE BOX
LENGTH OF HOSPITAL STAY: 1d      CHIEF COMPLAINT:   Patient is a 75y old  Male who presents with a chief complaint of Shortness of breath and chest pain (08 Sep 2020 19:29)      OVER Past 24hrs:  The patient was seen and examined at bedside there were no acute events during the night. Patient  feels better today. SOb has improved.        In the ED Vital Signs Last 24 Hrs  T(C): 36.6 (08 Sep 2020 15:56), Max: 36.6 (08 Sep 2020 13:37)  T(F): 97.9 (08 Sep 2020 15:56), Max: 97.9 (08 Sep 2020 13:37)  HR: 78 (08 Sep 2020 15:56) (77 - 78)  BP: 155/84 (08 Sep 2020 15:56) (155/84 - 172/79)  RR: 18 (08 Sep 2020 15:56) (18 - 18)  SpO2: 96% (08 Sep 2020 15:56) (96% - 96%) (08 Sep 2020 19:29)    PAST MEDICAL & SURGICAL HISTORY  PAST MEDICAL & SURGICAL HISTORY:  Other congestive heart failure  Hypothyroidism, unspecified type  High cholesterol  Hypertension, unspecified type  Type 2 diabetes mellitus with other neurologic complication, with long-term current use of insulin  Coronary artery disease with other form of angina pectoris  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 No nausea, vomiting, or hematemesis; No diarrhea or constipation. No melena or hematochezia.  GENITOURINARY: No dysuria, frequency, hematuria, or incontinence  NEUROLOGICAL: No headaches, memory loss, loss of strength, numbness, or tremors  SKIN: No itching, burning on scalp   LYMPH NODES: No enlarged glands  ENDOCRINE: No heat or cold intolerance; No hair loss  MUSCULOSKELETAL: No joint pain or swelling; No muscle, back, or extremity pain  PSYCHIATRIC: No depression, anxiety, mood swings, or difficulty sleeping  HEME/LYMPH: No easy bruising, or bleeding gums  ALLERY AND IMMUNOLOGIC: No hives or eczema    ALLERGIES:  contrast media (iodine-based) (Other)    MEDICATIONS:  STANDING MEDICATIONS  apixaban 2.5 milliGRAM(s) Oral two times a day  aspirin  chewable 81 milliGRAM(s) Oral daily  atorvastatin Oral Tab/Cap - Peds 40 milliGRAM(s) Oral daily  dextrose 5%. 1000 milliLiter(s) IV Continuous <Continuous>  dextrose 50% Injectable 12.5 Gram(s) IV Push once  dextrose 50% Injectable 25 Gram(s) IV Push once  dextrose 50% Injectable 25 Gram(s) IV Push once  furosemide   Injectable 40 milliGRAM(s) IV Push every 12 hours  insulin glargine Injectable (LANTUS) 20 Unit(s) SubCutaneous at bedtime  insulin lispro (HumaLOG) corrective regimen sliding scale   SubCutaneous three times a day before meals  insulin lispro Injectable (HumaLOG) 6 Unit(s) SubCutaneous Before meals and at bedtime  isosorbide   mononitrate ER Tablet (IMDUR) 30 milliGRAM(s) Oral daily  levothyroxine 200 MICROGram(s) Oral daily  metoprolol tartrate Oral Tab/Cap - Peds 50 milliGRAM(s) Oral at bedtime  metoprolol tartrate Oral Tab/Cap - Peds 25 milliGRAM(s) Oral daily  pantoprazole    Tablet 40 milliGRAM(s) Oral before breakfast  ranolazine 500 milliGRAM(s) Oral two times a day  sacubitril 24 mG/valsartan 26 mG 1 Tablet(s) Oral two times a day  tamsulosin 0.4 milliGRAM(s) Oral at bedtime    PRN MEDICATIONS  dextrose 40% Gel 15 Gram(s) Oral once PRN  glucagon  Injectable 1 milliGRAM(s) IntraMuscular once PRN    VITALS:   T(F): 97.7  HR: 60  BP: 128/71  RR: 20  SpO2: 98%    PHYSICAL EXAM:  General: No acute distress.  Alert, oriented, interactive, nonfocal. elderly  Male     HEENT: Pupils equal, reactive to light symmetrically. Scal skin excoriation  and bruising     PULM: Clear to auscultation bilaterally decreased in blower bases with mild crackles , no significant sputum production.    CVS: Regular rate and rhythm, no murmurs, rubs, or gallops.    GI: Soft, nondistended, nontender, no masses.    MSK: 1+ b/l LE edema, nontender.    SKIN: Warm and well perfused, no rashes noted.    PSYCH: AAOx3    NEURO: NON focal    LABS:                        13.2   8.06  )-----------( 196      ( 08 Sep 2020 14:09 )             41.7     09-08    140  |  107  |  35<H>  ----------------------------<  132<H>  5.3<H>   |  20  |  1.7<H>    Ca    8.9      08 Sep 2020 14:09  Mg     2.0     09-08    TPro  6.7  /  Alb  3.2<L>  /  TBili  0.4  /  DBili  x   /  AST  60<H>  /  ALT  29  /  AlkPhos  42  09-08          Troponin T, Serum: 0.01 ng/mL (09-08-20 @ 14:09)      CARDIAC MARKERS ( 08 Sep 2020 14:09 )  x     / 0.01 ng/mL / x     / x     / x          RADIOLOGY:      < from: Xray Chest 1 View-PORTABLE IMMEDIATE (09.08.20 @ 16:02) >    INTERPRETATION:  Clinical History / Reason for exam: Chest pain.    Comparison : Chest radiograph Nini 3, 2016.    Technique/Positioning: Frontal portable, low lungvolumes.    Findings:    Support devices: Multichamber left-sided ICD.    Cardiac/mediastinum/hilum: Heart is enlarged. The patient is status post median sternotomy.    Lung parenchyma/Pleura: Diffuse interstitial opacities.    Skeleton/soft tissues:Degenerative change of the left shoulder.    Impression:    Cardiomegaly. CHF. Worsening.  < from: 12 Lead ECG (09.08.20 @ 14:21) >  Ventricular Rate 75 BPM    Atrial Rate 75 BPM    P-R Interval 140 ms    QRS Duration 130 ms    Q-T Interval 474 ms    QTC Calculation(Bezet) 529 ms    P Axis 33 degrees    R Axis 129 degrees    T Axis -38 degrees    Diagnosis Line atrial-sensed ventricular-paced complexes  Non-specific intra-ventricular conduction block  Possible Right ventricular hypertrophy  Cannot rule out Septal infarct , age undetermined  Lateral infarct , age undetermined  T wave abnormality, consider inferior ischemia  Abnormal ECG    < end of copied text >

## 2020-09-09 NOTE — PROGRESS NOTE ADULT - ASSESSMENT
This is a 75 year old M patient with past medical history of HFrEF last echo showed and EF of 26% according to the wife s/p AICD, CAD s/p CABG in 2016, Bullous pemphigoid s/p one dose of IVIG, Present to the ED for shortness of breath and chest discomfort of one day duration.      IMPRESSION  Acute on Chronic heart failure exacerbation   >troponin is negative, CXR  showing congestion, BNP 11k, 2+ edema in LE  >Shortness of breath along with chest discomfort after receiving IVIG.  CKD cr. at baseline 1.7 likely vs OVI    HO Biventricular ICD   HO Bullous pemphigoid stable -O/P derm Follow up   HO DM  HO BPH         Plan  f/u echo   continue Diuresing  monitoring Cr and BMP   f/u CXR in AM   Cardio  rec noted Dr Godinez spoke with Attending   Continue with his home meds, Entresto, Metoprolol and Lasix  Patient EKG showed a paced rhythm   Patient is on Eliquis 2.5 mg BID  s/p IVIG   Continue with Flomax  f/u bladder  scan           Electrolyte Imbalances: Correct as needed  []  Hyponatremia   /   Hypernatremia  []   []  Hypokalemia   /   Hyperkalemia  []   []  Hypochlorhydria   /    Hypochlorhydria  []   []  Hypomagnesemia   /   Hypermagnesemia  []   []  Hypophosphatemia   /   Hyperphosphatemia  []       GI ppx:                                   [] Not indicated   /   [x] Pantoprazole 40mg PO Daily    DVT ppx: eliquis   [] Not indicated / [] Heparin 5000mg SubQ / [] Lovenox 40mg SubQ / [] SCDs    Fluids:   [x] PO  |  [] IVF    Activity:  [X] Assisatnce needed   [X] Increase as Tolerated  /  [] OOB w/ assist  /  [] Bed Rest    BMI:      DISPO:  Patient to be discharged when condition(s) optimized.  [x ] From Home     [ ] NH/SNF   [ ] 4[facility] Rehab  [ ] Detox Clinic  [X] Plan Discussion with patient and/or family.  [X] Discussed Case and Plan with the Medical Attending.    CODE STATUS  [X] FULL   /    [] DNR

## 2020-09-10 ENCOUNTER — TRANSCRIPTION ENCOUNTER (OUTPATIENT)
Age: 75
End: 2020-09-10

## 2020-09-10 VITALS
WEIGHT: 235.45 LBS | DIASTOLIC BLOOD PRESSURE: 88 MMHG | TEMPERATURE: 97 F | RESPIRATION RATE: 17 BRPM | HEART RATE: 76 BPM | SYSTOLIC BLOOD PRESSURE: 156 MMHG

## 2020-09-10 DIAGNOSIS — E11.9 TYPE 2 DIABETES MELLITUS WITHOUT COMPLICATIONS: ICD-10-CM

## 2020-09-10 DIAGNOSIS — L12.0 BULLOUS PEMPHIGOID: ICD-10-CM

## 2020-09-10 DIAGNOSIS — Z95.810 PRESENCE OF AUTOMATIC (IMPLANTABLE) CARDIAC DEFIBRILLATOR: ICD-10-CM

## 2020-09-10 LAB
A1C WITH ESTIMATED AVERAGE GLUCOSE RESULT: 6.2 % — HIGH (ref 4–5.6)
ANION GAP SERPL CALC-SCNC: 10 MMOL/L — SIGNIFICANT CHANGE UP (ref 7–14)
ANION GAP SERPL CALC-SCNC: 8 MMOL/L — SIGNIFICANT CHANGE UP (ref 7–14)
BASOPHILS # BLD AUTO: 0.03 K/UL — SIGNIFICANT CHANGE UP (ref 0–0.2)
BASOPHILS NFR BLD AUTO: 0.4 % — SIGNIFICANT CHANGE UP (ref 0–1)
BUN SERPL-MCNC: 31 MG/DL — HIGH (ref 10–20)
BUN SERPL-MCNC: 34 MG/DL — HIGH (ref 10–20)
CALCIUM SERPL-MCNC: 8.7 MG/DL — SIGNIFICANT CHANGE UP (ref 8.5–10.1)
CALCIUM SERPL-MCNC: 8.7 MG/DL — SIGNIFICANT CHANGE UP (ref 8.5–10.1)
CHLORIDE SERPL-SCNC: 104 MMOL/L — SIGNIFICANT CHANGE UP (ref 98–110)
CHLORIDE SERPL-SCNC: 105 MMOL/L — SIGNIFICANT CHANGE UP (ref 98–110)
CO2 SERPL-SCNC: 26 MMOL/L — SIGNIFICANT CHANGE UP (ref 17–32)
CO2 SERPL-SCNC: 29 MMOL/L — SIGNIFICANT CHANGE UP (ref 17–32)
CREAT SERPL-MCNC: 1.7 MG/DL — HIGH (ref 0.7–1.5)
CREAT SERPL-MCNC: 1.8 MG/DL — HIGH (ref 0.7–1.5)
EOSINOPHIL # BLD AUTO: 0.24 K/UL — SIGNIFICANT CHANGE UP (ref 0–0.7)
EOSINOPHIL NFR BLD AUTO: 2.9 % — SIGNIFICANT CHANGE UP (ref 0–8)
ESTIMATED AVERAGE GLUCOSE: 131 MG/DL — HIGH (ref 68–114)
GLUCOSE BLDC GLUCOMTR-MCNC: 157 MG/DL — HIGH (ref 70–99)
GLUCOSE BLDC GLUCOMTR-MCNC: 98 MG/DL — SIGNIFICANT CHANGE UP (ref 70–99)
GLUCOSE SERPL-MCNC: 101 MG/DL — HIGH (ref 70–99)
GLUCOSE SERPL-MCNC: 104 MG/DL — HIGH (ref 70–99)
HCT VFR BLD CALC: 43.4 % — SIGNIFICANT CHANGE UP (ref 42–52)
HGB BLD-MCNC: 13.7 G/DL — LOW (ref 14–18)
IMM GRANULOCYTES NFR BLD AUTO: 0.4 % — HIGH (ref 0.1–0.3)
LYMPHOCYTES # BLD AUTO: 1.87 K/UL — SIGNIFICANT CHANGE UP (ref 1.2–3.4)
LYMPHOCYTES # BLD AUTO: 22.8 % — SIGNIFICANT CHANGE UP (ref 20.5–51.1)
MAGNESIUM SERPL-MCNC: 2.3 MG/DL — SIGNIFICANT CHANGE UP (ref 1.8–2.4)
MCHC RBC-ENTMCNC: 27.7 PG — SIGNIFICANT CHANGE UP (ref 27–31)
MCHC RBC-ENTMCNC: 31.6 G/DL — LOW (ref 32–37)
MCV RBC AUTO: 87.7 FL — SIGNIFICANT CHANGE UP (ref 80–94)
MONOCYTES # BLD AUTO: 0.7 K/UL — HIGH (ref 0.1–0.6)
MONOCYTES NFR BLD AUTO: 8.5 % — SIGNIFICANT CHANGE UP (ref 1.7–9.3)
NEUTROPHILS # BLD AUTO: 5.32 K/UL — SIGNIFICANT CHANGE UP (ref 1.4–6.5)
NEUTROPHILS NFR BLD AUTO: 65 % — SIGNIFICANT CHANGE UP (ref 42.2–75.2)
NRBC # BLD: 0 /100 WBCS — SIGNIFICANT CHANGE UP (ref 0–0)
PLATELET # BLD AUTO: 146 K/UL — SIGNIFICANT CHANGE UP (ref 130–400)
POTASSIUM SERPL-MCNC: 3.6 MMOL/L — SIGNIFICANT CHANGE UP (ref 3.5–5)
POTASSIUM SERPL-MCNC: 3.8 MMOL/L — SIGNIFICANT CHANGE UP (ref 3.5–5)
POTASSIUM SERPL-SCNC: 3.6 MMOL/L — SIGNIFICANT CHANGE UP (ref 3.5–5)
POTASSIUM SERPL-SCNC: 3.8 MMOL/L — SIGNIFICANT CHANGE UP (ref 3.5–5)
RBC # BLD: 4.95 M/UL — SIGNIFICANT CHANGE UP (ref 4.7–6.1)
RBC # FLD: 15.8 % — HIGH (ref 11.5–14.5)
SODIUM SERPL-SCNC: 140 MMOL/L — SIGNIFICANT CHANGE UP (ref 135–146)
SODIUM SERPL-SCNC: 142 MMOL/L — SIGNIFICANT CHANGE UP (ref 135–146)
WBC # BLD: 8.19 K/UL — SIGNIFICANT CHANGE UP (ref 4.8–10.8)
WBC # FLD AUTO: 8.19 K/UL — SIGNIFICANT CHANGE UP (ref 4.8–10.8)

## 2020-09-10 PROCEDURE — 99239 HOSP IP/OBS DSCHRG MGMT >30: CPT

## 2020-09-10 PROCEDURE — 71045 X-RAY EXAM CHEST 1 VIEW: CPT | Mod: 26

## 2020-09-10 RX ORDER — ISOSORBIDE MONONITRATE 60 MG/1
1 TABLET, EXTENDED RELEASE ORAL
Qty: 0 | Refills: 0 | DISCHARGE
Start: 2020-09-10

## 2020-09-10 RX ORDER — ISOSORBIDE DINITRATE 5 MG/1
0 TABLET ORAL
Qty: 0 | Refills: 0 | DISCHARGE

## 2020-09-10 RX ADMIN — Medication 40 MILLIGRAM(S): at 05:30

## 2020-09-10 RX ADMIN — ISOSORBIDE MONONITRATE 30 MILLIGRAM(S): 60 TABLET, EXTENDED RELEASE ORAL at 11:08

## 2020-09-10 RX ADMIN — SACUBITRIL AND VALSARTAN 1 TABLET(S): 24; 26 TABLET, FILM COATED ORAL at 05:30

## 2020-09-10 RX ADMIN — APIXABAN 2.5 MILLIGRAM(S): 2.5 TABLET, FILM COATED ORAL at 05:30

## 2020-09-10 RX ADMIN — Medication 1: at 12:16

## 2020-09-10 RX ADMIN — Medication 25 MILLIGRAM(S): at 05:30

## 2020-09-10 RX ADMIN — Medication 6 UNIT(S): at 12:16

## 2020-09-10 RX ADMIN — RANOLAZINE 500 MILLIGRAM(S): 500 TABLET, FILM COATED, EXTENDED RELEASE ORAL at 05:30

## 2020-09-10 RX ADMIN — Medication 81 MILLIGRAM(S): at 11:08

## 2020-09-10 RX ADMIN — Medication 200 MICROGRAM(S): at 05:30

## 2020-09-10 RX ADMIN — PANTOPRAZOLE SODIUM 40 MILLIGRAM(S): 20 TABLET, DELAYED RELEASE ORAL at 06:20

## 2020-09-10 NOTE — DISCHARGE NOTE PROVIDER - HOSPITAL COURSE
The patient is a 75 year-old male with a PMHx of HFrEF (last echo EF 26%) s/p AICD, CAD s/p CABG in 2016, DM and bullous pemphigoid who presented to ED due to shortness of breath, bilateral lower extremities edema and chest discomfort of one day duration following IVIG treatment for bullous pemphigoid. Troponins were negative, proBNP was 93336, EKG with a QTC interval 529. A chest x-ray was performed on presentation which showed worsening CHF. The patient was admitted for acute on chronic CHF exacerbation and was treated with IV Lasix. His home medications were continued and his clinical condition improved.     Patient's condition improved with improvement of edema and shortness of breath. He is clinically stable for discharge.

## 2020-09-10 NOTE — PROGRESS NOTE ADULT - REASON FOR ADMISSION
Shortness of breath and chest pain

## 2020-09-10 NOTE — DISCHARGE NOTE NURSING/CASE MANAGEMENT/SOCIAL WORK - PATIENT PORTAL LINK FT
You can access the FollowMyHealth Patient Portal offered by [facility] by registering at the following website: [email or web address]. By joining [employer]’s FollowMyHealth portal, you will also be able to view your health information using other applications (apps) compatible with our system.

## 2020-09-10 NOTE — PROGRESS NOTE ADULT - SUBJECTIVE AND OBJECTIVE BOX
SUBJ:  No new complains, feels better    MEDICATIONS  (STANDING):  apixaban 2.5 milliGRAM(s) Oral two times a day  aspirin  chewable 81 milliGRAM(s) Oral daily  atorvastatin Oral Tab/Cap - Peds 40 milliGRAM(s) Oral daily  dextrose 5%. 1000 milliLiter(s) (50 mL/Hr) IV Continuous <Continuous>  dextrose 50% Injectable 12.5 Gram(s) IV Push once  dextrose 50% Injectable 25 Gram(s) IV Push once  dextrose 50% Injectable 25 Gram(s) IV Push once  furosemide   Injectable 40 milliGRAM(s) IV Push every 12 hours  influenza   Vaccine 0.5 milliLiter(s) IntraMuscular once  insulin glargine Injectable (LANTUS) 20 Unit(s) SubCutaneous at bedtime  insulin lispro (HumaLOG) corrective regimen sliding scale   SubCutaneous three times a day before meals  insulin lispro Injectable (HumaLOG) 6 Unit(s) SubCutaneous Before meals and at bedtime  isosorbide   mononitrate ER Tablet (IMDUR) 30 milliGRAM(s) Oral daily  levothyroxine 200 MICROGram(s) Oral daily  metoprolol tartrate Oral Tab/Cap - Peds 50 milliGRAM(s) Oral at bedtime  metoprolol tartrate Oral Tab/Cap - Peds 25 milliGRAM(s) Oral daily  pantoprazole    Tablet 40 milliGRAM(s) Oral before breakfast  ranolazine 500 milliGRAM(s) Oral two times a day  sacubitril 24 mG/valsartan 26 mG 1 Tablet(s) Oral two times a day  tamsulosin 0.4 milliGRAM(s) Oral at bedtime    MEDICATIONS  (PRN):  dextrose 40% Gel 15 Gram(s) Oral once PRN Blood Glucose LESS THAN 70 milliGRAM(s)/deciLiter  glucagon  Injectable 1 milliGRAM(s) IntraMuscular once PRN Glucose <70 milliGRAM(s)/deciLiter            Vital Signs Last 24 Hrs  T(C): 36.2 (10 Sep 2020 05:26), Max: 36.7 (09 Sep 2020 16:03)  T(F): 97.1 (10 Sep 2020 05:26), Max: 98 (09 Sep 2020 16:03)  HR: 76 (10 Sep 2020 05:26) (74 - 77)  BP: 156/88 (10 Sep 2020 05:26) (129/69 - 156/88)  BP(mean): --  RR: 17 (10 Sep 2020 05:26) (17 - 20)  SpO2: 98% (09 Sep 2020 19:38) (96% - 98%)     REVIEW OF SYSTEMS:  CONSTITUTIONAL: No fever, weight loss, or fatigue  CARDIOLOGY: PAtient denies chest pain, shortness of breath or syncopal episodes.   RESPIRATORY: denies shortness of breath, wheezeing.   NEUROLOGICAL: NO weakness, no focal deficits to report.  ENDOCRINOLOGICAL: no recent change in diabetic medications.   GI: no BRBPR, no N,V,diarrhea.    PSYCHIATRY: normal mood and affect  HEENT: no nasal discharge, no ecchymosis  SKIN: no ecchymosis, no breakdown  MUSCULOSKELETAL: Full range of motion x4.        PHYSICAL EXAM:  · CONSTITUTIONAL:	Well-developed, well nourished    BMI-  ·RESPIRATORY:   airway patent; breath sounds equal; good air movement; respirations non-labored; clear to auscultation bilaterally; no chest wall tenderness; no intercostal retractions; no rales,rhonchi or wheeze  · CARDIOVASCULAR	regular rate and rhythm  no rub  no murmur  normal PMI  · EXTREMITIES: No cyanosis, clubbing or edema  · VASCULAR: 	Equal and normal pulses (carotid, femoral, dorsalis pedis)  	  TELEMETRY:    ECG:    TTE:    LABS:                        13.7   8.19  )-----------( 146      ( 10 Sep 2020 05:41 )             43.4     09-10    142  |  105  |  31<H>  ----------------------------<  104<H>  3.8   |  29  |  1.7<H>    Ca    8.7      10 Sep 2020 05:41  Mg     2.3     09-10    TPro  6.7  /  Alb  3.2<L>  /  TBili  0.4  /  DBili  x   /  AST  60<H>  /  ALT  29  /  AlkPhos  42  09-08    CARDIAC MARKERS ( 08 Sep 2020 14:09 )  x     / 0.01 ng/mL / x     / x     / x              I&O's Summary    09 Sep 2020 07:01  -  10 Sep 2020 07:00  --------------------------------------------------------  IN: 240 mL / OUT: 2900 mL / NET: -2660 mL    10 Sep 2020 07:01  -  10 Sep 2020 12:10  --------------------------------------------------------  IN: 0 mL / OUT: 425 mL / NET: -425 mL      BNP  RADIOLOGY & ADDITIONAL STUDIES:    IMPRESSION AND PLAN:    HFrEF doing better  HARVEY moreno chair

## 2020-09-10 NOTE — PROGRESS NOTE ADULT - SUBJECTIVE AND OBJECTIVE BOX
SUBJECTIVE:    Patient is a 75y old Male who presents with a chief complaint of Shortness of breath and chest pain (10 Sep 2020 07:20)    Currently admitted to medicine with the primary diagnosis of CHF exacerbation     Today is hospital day 2d. This morning he is resting comfortably in bed and reports no new issues or overnight events. Patient states he is feeling much better today.     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     ALLERGIES:  contrast media (iodine-based) (Other)    MEDICATIONS:  STANDING MEDICATIONS  apixaban 2.5 milliGRAM(s) Oral two times a day  aspirin  chewable 81 milliGRAM(s) Oral daily  atorvastatin Oral Tab/Cap - Peds 40 milliGRAM(s) Oral daily  dextrose 5%. 1000 milliLiter(s) IV Continuous <Continuous>  dextrose 50% Injectable 12.5 Gram(s) IV Push once  dextrose 50% Injectable 25 Gram(s) IV Push once  dextrose 50% Injectable 25 Gram(s) IV Push once  furosemide   Injectable 40 milliGRAM(s) IV Push every 12 hours  influenza   Vaccine 0.5 milliLiter(s) IntraMuscular once  insulin glargine Injectable (LANTUS) 20 Unit(s) SubCutaneous at bedtime  insulin lispro (HumaLOG) corrective regimen sliding scale   SubCutaneous three times a day before meals  insulin lispro Injectable (HumaLOG) 6 Unit(s) SubCutaneous Before meals and at bedtime  isosorbide   mononitrate ER Tablet (IMDUR) 30 milliGRAM(s) Oral daily  levothyroxine 200 MICROGram(s) Oral daily  metoprolol tartrate Oral Tab/Cap - Peds 50 milliGRAM(s) Oral at bedtime  metoprolol tartrate Oral Tab/Cap - Peds 25 milliGRAM(s) Oral daily  pantoprazole    Tablet 40 milliGRAM(s) Oral before breakfast  ranolazine 500 milliGRAM(s) Oral two times a day  sacubitril 24 mG/valsartan 26 mG 1 Tablet(s) Oral two times a day  tamsulosin 0.4 milliGRAM(s) Oral at bedtime    PRN MEDICATIONS  dextrose 40% Gel 15 Gram(s) Oral once PRN  glucagon  Injectable 1 milliGRAM(s) IntraMuscular once PRN    VITALS:   T(F): 97.1  HR: 76  BP: 156/88  RR: 17  SpO2: 98%    PHYSICAL EXAM:  GEN: NAD, lying in bed comfortably  HEENT: Normocephalic, atraumatic, EOMI  LUNGS: Clear to auscultation bilaterally  HEART: S1/S2 present. RRR.   ABD: Soft, non-tender, non-distended. Bowel sounds present.  EXT: 2+ peripheral pulses. No edema.   NEURO: AAOX3. non-focal.     LABS:                        13.7   8.19  )-----------( 146      ( 10 Sep 2020 05:41 )             43.4     09-10    142  |  105  |  31<H>  ----------------------------<  104<H>  3.8   |  29  |  1.7<H>    Ca    8.7      10 Sep 2020 05:41  Mg     2.3     09-10    TPro  6.7  /  Alb  3.2<L>  /  TBili  0.4  /  DBili  x   /  AST  60<H>  /  ALT  29  /  AlkPhos  42  09-08    CARDIAC MARKERS ( 08 Sep 2020 14:09 )  x     / 0.01 ng/mL / x     / x     / x          RADIOLOGY:

## 2020-09-10 NOTE — DISCHARGE NOTE PROVIDER - NSDCMRMEDTOKEN_GEN_ALL_CORE_FT
aspirin 81 mg oral tablet, chewable: 1 tab(s) orally once a day  atorvastatin 40 mg oral tablet: 1 tab(s) orally once a day  Eliquis 2.5 mg oral tablet: orally once a day  Entresto 24 mg-26 mg oral tablet: 1 tab(s) orally 2 times a day  famotidine 40 mg oral tablet: 1 tab(s) orally once a day (at bedtime)  fenofibrate 160 mg oral tablet: 1 tab(s) orally once a day  Flomax 0.4 mg oral capsule: 1 cap(s) orally once a day  folic acid 1 mg oral tablet: 1 tab(s) orally once a day  furosemide 40 mg oral tablet: 1 tab(s) orally once a day  glipiZIDE 2.5 mg oral tablet, extended release: 1 tab(s) orally once a day  isosorbide mononitrate 30 mg oral tablet, extended release: 1 tab(s) orally once a day  Januvia 50 mg oral tablet: 1 tab(s) orally once a day  Lantus: 42  subcutaneous once a day (at bedtime)  levothyroxine 200 mcg (0.2 mg) oral tablet: 1 tab(s) orally once a day  metoprolol tartrate 25 mg oral tablet: orally once a day  metoprolol tartrate 50 mg oral tablet: orally once a day (at bedtime)  Ranexa 500 mg oral tablet, extended release: 1 tab(s) orally 2 times a day

## 2020-09-10 NOTE — DISCHARGE NOTE PROVIDER - CARE PROVIDER_API CALL
Geoff Godinez)  Cardiology; Interventional Cardiology  [address] [place] [address]  Phone: [phone]  Fax: [phone]  Established Patient  Follow Up Time: 1 week

## 2020-09-10 NOTE — PROGRESS NOTE ADULT - SUBJECTIVE AND OBJECTIVE BOX
ALYSSA TILLEY  75y Male    CHIEF COMPLAINT:    Patient is a 75y old  Male who presents with a chief complaint of Shortness of breath and chest pain (09 Sep 2020 12:23)      INTERVAL HPI/OVERNIGHT EVENTS:    Patient seen and examined.    ROS: All other systems are negative.    Vital Signs:    T(F): 97.1 (09-10-20 @ 05:26), Max: 98 (20 @ 16:03)  HR: 76 (09-10-20 @ 05:26) (60 - 77)  BP: 156/88 (09-10-20 @ 05:26) (128/71 - 156/88)  RR: 17 (09-10-20 @ 05:26) (17 - 20)  SpO2: 98% (20 @ 19:38) (96% - 98%)  I&O's Summary    09 Sep 2020 07:01  -  10 Sep 2020 07:00  --------------------------------------------------------  IN: 240 mL / OUT: 2900 mL / NET: -2660 mL      Daily     Daily Weight in k.8 (10 Sep 2020 05:26)  CAPILLARY BLOOD GLUCOSE      POCT Blood Glucose.: 134 mg/dL (09 Sep 2020 21:19)  POCT Blood Glucose.: 120 mg/dL (09 Sep 2020 16:51)  POCT Blood Glucose.: 157 mg/dL (09 Sep 2020 11:50)  POCT Blood Glucose.: 103 mg/dL (09 Sep 2020 08:13)      PHYSICAL EXAM:    GENERAL:  NAD  SKIN: No rashes or lesions  HENT: Atrumatic. Normocephalic. PERRL. Moist membranes.  NECK: Supple, No JVD. No lymphadenopathy.  PULMONARY: CTA B/L. No wheezing. No rales  CVS: Normal S1, S2. Rate and Rythm are regular. No murmurs.  ABDOMEN/GI: Soft, Nontender, Nondistended; BS present  EXTREMITIES: Peripheral pulses intact. No edema B/L LE.  NEUROLOGIC:  No motor or sensory deficit.  PSYCH: Alert & oriented x 3    Consultant(s) Notes Reviewed:  [x ] YES  [ ] NO  Care Discussed with Consultants/Other Providers [ x] YES  [ ] NO    EKG reviewed  Telemetry reviewed    LABS:                        13.7   8.19  )-----------( 146      ( 10 Sep 2020 05:41 )             43.4     09-10    142  |  105  |  31<H>  ----------------------------<  104<H>  3.8   |  29  |  1.7<H>    Ca    8.7      10 Sep 2020 05:41  Mg     2.3     09-10    TPro  6.7  /  Alb  3.2<L>  /  TBili  0.4  /  DBili  x   /  AST  60<H>  /  ALT  29  /  AlkPhos  42        Serum Pro-Brain Natriuretic Peptide: 99498 pg/mL (20 @ 14:09)    Trop 0.01, CKMB --, CK --, 20 @ 14:09        RADIOLOGY & ADDITIONAL TESTS:      Imaging or report Personally Reviewed:  [ ] YES  [ ] NO    Medications:  Standing  apixaban 2.5 milliGRAM(s) Oral two times a day  aspirin  chewable 81 milliGRAM(s) Oral daily  atorvastatin Oral Tab/Cap - Peds 40 milliGRAM(s) Oral daily  dextrose 5%. 1000 milliLiter(s) IV Continuous <Continuous>  dextrose 50% Injectable 12.5 Gram(s) IV Push once  dextrose 50% Injectable 25 Gram(s) IV Push once  dextrose 50% Injectable 25 Gram(s) IV Push once  furosemide   Injectable 40 milliGRAM(s) IV Push every 12 hours  influenza   Vaccine 0.5 milliLiter(s) IntraMuscular once  insulin glargine Injectable (LANTUS) 20 Unit(s) SubCutaneous at bedtime  insulin lispro (HumaLOG) corrective regimen sliding scale   SubCutaneous three times a day before meals  insulin lispro Injectable (HumaLOG) 6 Unit(s) SubCutaneous Before meals and at bedtime  isosorbide   mononitrate ER Tablet (IMDUR) 30 milliGRAM(s) Oral daily  levothyroxine 200 MICROGram(s) Oral daily  metoprolol tartrate Oral Tab/Cap - Peds 50 milliGRAM(s) Oral at bedtime  metoprolol tartrate Oral Tab/Cap - Peds 25 milliGRAM(s) Oral daily  pantoprazole    Tablet 40 milliGRAM(s) Oral before breakfast  ranolazine 500 milliGRAM(s) Oral two times a day  sacubitril 24 mG/valsartan 26 mG 1 Tablet(s) Oral two times a day  tamsulosin 0.4 milliGRAM(s) Oral at bedtime    PRN Meds  dextrose 40% Gel 15 Gram(s) Oral once PRN  glucagon  Injectable 1 milliGRAM(s) IntraMuscular once PRN      Case discussed with resident    Care discussed with pt/family ALYSSA TILLEY  75y Male    CHIEF COMPLAINT:    Patient is a 75y old  Male who presents with a chief complaint of Shortness of breath and chest pain (09 Sep 2020 12:23)      INTERVAL HPI/OVERNIGHT EVENTS:    Patient seen and examined. No CP or sob. No cough or fever.     ROS: All other systems are negative.    Vital Signs:    T(F): 97.1 (09-10-20 @ 05:26), Max: 98 (20 @ 16:03)  HR: 76 (09-10-20 @ 05:26) (60 - 77)  BP: 156/88 (09-10-20 @ 05:26) (128/71 - 156/88)  RR: 17 (09-10-20 @ 05:26) (17 - 20)  SpO2: 98% (20 @ 19:38) (96% - 98%)  I&O's Summary    09 Sep 2020 07:01  -  10 Sep 2020 07:00  --------------------------------------------------------  IN: 240 mL / OUT: 2900 mL / NET: -2660 mL      Daily     Daily Weight in k.8 (10 Sep 2020 05:26)  CAPILLARY BLOOD GLUCOSE      POCT Blood Glucose.: 134 mg/dL (09 Sep 2020 21:19)  POCT Blood Glucose.: 120 mg/dL (09 Sep 2020 16:51)  POCT Blood Glucose.: 157 mg/dL (09 Sep 2020 11:50)  POCT Blood Glucose.: 103 mg/dL (09 Sep 2020 08:13)      PHYSICAL EXAM:    GENERAL:  NAD  SKIN: No rashes or lesions  HENT: Atraumatic Normocephalic. PERRL. Moist membranes.  NECK: Supple, No JVD. No lymphadenopathy.  PULMONARY: CTA B/L. No wheezing. No rales  CVS: Normal S1, S2. Rate and Rhythm are regular. No murmurs.  ABDOMEN/GI: Soft, Nontender, Nondistended; BS present  EXTREMITIES: Peripheral pulses intact. No edema B/L LE.  NEUROLOGIC:  No motor or sensory deficit.  PSYCH: Alert & oriented x 3    Consultant(s) Notes Reviewed:  [x ] YES  [ ] NO  Care Discussed with Consultants/Other Providers [ x] YES  [ ] NO    EKG reviewed  Telemetry reviewed    LABS:                        13.7   8.19  )-----------( 146      ( 10 Sep 2020 05:41 )             43.4     09-10    142  |  105  |  31<H>  ----------------------------<  104<H>  Creatinine Trend: 1.7<--, 1.8<--, 1.7<--  3.8   |  29  |  1.7<H>    Ca    8.7      10 Sep 2020 05:41  Mg     2.3     09-10    TPro  6.7  /  Alb  3.2<L>  /  TBili  0.4  /  DBili  x   /  AST  60<H>  /  ALT  29  /  AlkPhos  42        Serum Pro-Brain Natriuretic Peptide: 73829 pg/mL (20 @ 14:09)    Trop 0.01, CKMB --, CK --, 20 @ 14:09        RADIOLOGY & ADDITIONAL TESTS:      Imaging or report Personally Reviewed:  [ ] YES  [ ] NO    Medications:  Standing  apixaban 2.5 milliGRAM(s) Oral two times a day  aspirin  chewable 81 milliGRAM(s) Oral daily  atorvastatin Oral Tab/Cap - Peds 40 milliGRAM(s) Oral daily  dextrose 5%. 1000 milliLiter(s) IV Continuous <Continuous>  dextrose 50% Injectable 12.5 Gram(s) IV Push once  dextrose 50% Injectable 25 Gram(s) IV Push once  dextrose 50% Injectable 25 Gram(s) IV Push once  furosemide   Injectable 40 milliGRAM(s) IV Push every 12 hours  influenza   Vaccine 0.5 milliLiter(s) IntraMuscular once  insulin glargine Injectable (LANTUS) 20 Unit(s) SubCutaneous at bedtime  insulin lispro (HumaLOG) corrective regimen sliding scale   SubCutaneous three times a day before meals  insulin lispro Injectable (HumaLOG) 6 Unit(s) SubCutaneous Before meals and at bedtime  isosorbide   mononitrate ER Tablet (IMDUR) 30 milliGRAM(s) Oral daily  levothyroxine 200 MICROGram(s) Oral daily  metoprolol tartrate Oral Tab/Cap - Peds 50 milliGRAM(s) Oral at bedtime  metoprolol tartrate Oral Tab/Cap - Peds 25 milliGRAM(s) Oral daily  pantoprazole    Tablet 40 milliGRAM(s) Oral before breakfast  ranolazine 500 milliGRAM(s) Oral two times a day  sacubitril 24 mG/valsartan 26 mG 1 Tablet(s) Oral two times a day  tamsulosin 0.4 milliGRAM(s) Oral at bedtime    PRN Meds  dextrose 40% Gel 15 Gram(s) Oral once PRN  glucagon  Injectable 1 milliGRAM(s) IntraMuscular once PRN      Case discussed with resident    Care discussed with pt/family

## 2020-09-10 NOTE — DISCHARGE NOTE PROVIDER - NSDCFUADDINST_GEN_ALL_CORE_FT
Call 911 and return to the emergency room if you have chest pain, difficulty breathing, high fevers, worsening of your symptoms, feel unwell or have nausea and vomiting.

## 2020-09-10 NOTE — PROGRESS NOTE ADULT - ASSESSMENT
76 yo M with PMHx of HFrEF (last echo EF 26%) s/p AICD, CAD s/p CABG in 2016, DM and bullous pemphigoid who presented to ED due to shortness of breath and chest discomfort of one day duration following IVIG treatment for bullous pemphigoid     #Acute on CHF exacerbation   -Presented with sob, chest discomfort, bilateral lower extremities edema after receiving IVIG, ptnt reports 3L fluids administered  -ProBNP 57248 9/8  -Trops x1 negative   -QTc 529   -CXR 9/8 with worsening CHF   -C/w home Entresto, Metoprolol and Lasix  -Keep I<O  -Daily weight, Fluid restriction to 1.5 L     #Biventricular ICD   -EKG with paced rhythm   -c/w Eliquis 2.5 mg BID    #Bullous pemphigoid  -O/P f/u     #DM  - Holding home meds   - Basal bolus regimen     #Possible OVI on possible underlying CKD 3  - unknown creatinine baseline  -Trend Cr, 1.7 this AM as on presentation     #BPH: C/w home Flomax     #DVT ppx: on Eliquis   #GI ppx: Protonix   #Diet: DASH with fluid restrictions 74 yo M with PMHx of HFrEF (last echo EF 26%) s/p AICD, CAD s/p CABG in 2016, DM and bullous pemphigoid who presented to ED due to shortness of breath and chest discomfort of one day duration following IVIG treatment for bullous pemphigoid     #Acute on CHF exacerbation   -Presented with sob, chest discomfort, bilateral lower extremities edema after receiving IVIG, ptnt reports 3L fluids administered  -ProBNP 22789 9/8  -Trops x1 negative   -QTc 529   -CXR 9/8 with worsening CHF   -C/w home Entresto, Metoprolol and Lasix  -Keep I<O  -Daily weight, Fluid restriction to 1.5 L     #Biventricular ICD   -EKG with paced rhythm   -c/w Eliquis 2.5 mg BID    #Bullous pemphigoid  -O/P f/u     #DM  - Holding home meds   - Basal bolus regimen     #Possible OVI on possible underlying CKD 3  - unknown creatinine baseline  -Trend Cr, 1.7 this AM as on presentation     #BPH: C/w home Flomax     DVT ppx: on Eliquis   GI ppx: Protonix   Diet: DASH with fluid restrictions   Dispo: d/c after CXR f/u

## 2020-09-10 NOTE — PROGRESS NOTE ADULT - ASSESSMENT
This is a 75 year old M patient with past medical history of HFrEF last echo showed and EF of 26% according to the wife s/p ICD, CAD s/p CABG in 2016, Bullous pemphigoid s/p one dose of IVIG, Present to the ED for shortness of breath and chest discomfort of one day duration.    1.	Ac HFrEF  2.	CAD S/P CABG  3.	DM-2  4.	H/O Bullous pemphigoid  5.	CKD-3          PLAN:     · This is a 75 year old M patient with past medical history of HFrEF last echo showed and EF of 26% according to the wife s/p ICD, CAD s/p CABG in 2016, Bullous pemphigoid s/p one dose of IVIG, Present to the ED for shortness of breath and chest discomfort of one day duration.    1.	Ac HFrEF  2.	CAD S/P CABG  3.	DM-2  4.	H/O Bullous pemphigoid  5.	CKD-3          PLAN:     ·	Fluid overload happened post IVIG  ·	S/P IV diuretics. Feels better now. Will switch back to Lasix to 40 mg po daily  ·	Low salt diet and water restriction to 1.5 L/D  ·	Care D/W the cardiology. Cleared to D/C home and F/U with him in one week  ·	Will cont all his home meds on D/C    * Med rec reviewed. Plan of care D/W the pt. Time spent 35 minutes.

## 2020-09-10 NOTE — DISCHARGE NOTE PROVIDER - NSDCCPCAREPLAN_GEN_ALL_CORE_FT
PRINCIPAL DISCHARGE DIAGNOSIS  Diagnosis: CHF exacerbation  Assessment and Plan of Treatment: Congestive heart failure is a chronic condition in which the heart has trouble pumping blood. In some cases of heart failure, fluid may back up into your lungs or you may have swelling (edema) in your lower legs. There are many causes of heart failure including high blood pressure, coronary artery disease, abnormal heart valves, heart muscle disease, lung disease, diabetes, etc. Symptoms include shortness of breath with activity or when lying flat, cough, swelling of the legs, fatigue, or increased urination during the night.   Treatment is aimed at managing the symptoms of heart failure and may include lifestyle changes, medications, or surgical procedures. Take medicines only as directed by your health care provider and do not stop unless instructed to do so. Eat heart-healthy foods with low or no trans/saturated fats, cholesterol and salt. Weigh yourself every day for early recognition of fluid accumulation.  SEEK IMMEDIATE MEDICAL CARE IF YOU HAVE ANY OF THE FOLLOWING SYMPTOMS: shortness of breath, change in mental status, chest pain, lightheadedness/dizziness/fainting, or worsening of symptoms including not being able to conduct normal physical activity.

## 2020-09-14 DIAGNOSIS — Z95.810 PRESENCE OF AUTOMATIC (IMPLANTABLE) CARDIAC DEFIBRILLATOR: ICD-10-CM

## 2020-09-14 DIAGNOSIS — Z79.82 LONG TERM (CURRENT) USE OF ASPIRIN: ICD-10-CM

## 2020-09-14 DIAGNOSIS — R07.9 CHEST PAIN, UNSPECIFIED: ICD-10-CM

## 2020-09-14 DIAGNOSIS — Z79.01 LONG TERM (CURRENT) USE OF ANTICOAGULANTS: ICD-10-CM

## 2020-09-14 DIAGNOSIS — I50.23 ACUTE ON CHRONIC SYSTOLIC (CONGESTIVE) HEART FAILURE: ICD-10-CM

## 2020-09-14 DIAGNOSIS — Z95.1 PRESENCE OF AORTOCORONARY BYPASS GRAFT: ICD-10-CM

## 2020-09-14 DIAGNOSIS — E03.9 HYPOTHYROIDISM, UNSPECIFIED: ICD-10-CM

## 2020-09-14 DIAGNOSIS — N40.0 BENIGN PROSTATIC HYPERPLASIA WITHOUT LOWER URINARY TRACT SYMPTOMS: ICD-10-CM

## 2020-09-14 DIAGNOSIS — N18.3 CHRONIC KIDNEY DISEASE, STAGE 3 (MODERATE): ICD-10-CM

## 2020-09-14 DIAGNOSIS — N17.9 ACUTE KIDNEY FAILURE, UNSPECIFIED: ICD-10-CM

## 2020-09-14 DIAGNOSIS — Z79.4 LONG TERM (CURRENT) USE OF INSULIN: ICD-10-CM

## 2020-09-14 DIAGNOSIS — I25.10 ATHEROSCLEROTIC HEART DISEASE OF NATIVE CORONARY ARTERY WITHOUT ANGINA PECTORIS: ICD-10-CM

## 2020-09-14 DIAGNOSIS — E11.22 TYPE 2 DIABETES MELLITUS WITH DIABETIC CHRONIC KIDNEY DISEASE: ICD-10-CM

## 2020-10-23 ENCOUNTER — APPOINTMENT (OUTPATIENT)
Dept: CARDIOLOGY | Facility: CLINIC | Age: 75
End: 2020-10-23
Payer: MEDICARE

## 2020-10-23 PROCEDURE — 93295 DEV INTERROG REMOTE 1/2/MLT: CPT

## 2020-10-23 PROCEDURE — 93296 REM INTERROG EVL PM/IDS: CPT

## 2020-11-02 PROBLEM — I10 ESSENTIAL (PRIMARY) HYPERTENSION: Chronic | Status: ACTIVE | Noted: 2020-09-08

## 2020-11-02 PROBLEM — E78.00 PURE HYPERCHOLESTEROLEMIA, UNSPECIFIED: Chronic | Status: ACTIVE | Noted: 2020-09-08

## 2020-11-02 PROBLEM — E03.9 HYPOTHYROIDISM, UNSPECIFIED: Chronic | Status: ACTIVE | Noted: 2020-09-08

## 2020-11-02 PROBLEM — E11.49 TYPE 2 DIABETES MELLITUS WITH OTHER DIABETIC NEUROLOGICAL COMPLICATION: Chronic | Status: ACTIVE | Noted: 2020-09-08

## 2020-11-02 PROBLEM — I25.118 ATHEROSCLEROTIC HEART DISEASE OF NATIVE CORONARY ARTERY WITH OTHER FORMS OF ANGINA PECTORIS: Chronic | Status: ACTIVE | Noted: 2020-09-08

## 2020-12-31 ENCOUNTER — APPOINTMENT (OUTPATIENT)
Dept: ELECTROPHYSIOLOGY | Facility: CLINIC | Age: 75
End: 2020-12-31

## 2021-02-03 NOTE — ED ADULT NURSE NOTE - OBJECTIVE STATEMENT
0 pt BIBA from home with complaint of chest pain x 2 weeks since he started a new medication given 1 nitro sublingual by ems

## 2021-02-12 ENCOUNTER — APPOINTMENT (OUTPATIENT)
Dept: CARDIOLOGY | Facility: CLINIC | Age: 76
End: 2021-02-12
Payer: MEDICARE

## 2021-02-12 PROCEDURE — 93296 REM INTERROG EVL PM/IDS: CPT

## 2021-02-12 PROCEDURE — 93295 DEV INTERROG REMOTE 1/2/MLT: CPT

## 2021-03-05 ENCOUNTER — NON-APPOINTMENT (OUTPATIENT)
Age: 76
End: 2021-03-05

## 2021-04-20 ENCOUNTER — APPOINTMENT (OUTPATIENT)
Dept: CARDIOLOGY | Facility: CLINIC | Age: 76
End: 2021-04-20
Payer: MEDICARE

## 2021-04-20 VITALS — TEMPERATURE: 98.2 F

## 2021-04-20 PROCEDURE — 93284 PRGRMG EVAL IMPLANTABLE DFB: CPT

## 2021-04-20 RX ORDER — APIXABAN 5 MG/1
5 TABLET, FILM COATED ORAL
Qty: 60 | Refills: 3 | Status: COMPLETED | COMMUNITY
End: 2021-04-20

## 2021-04-20 NOTE — ASSESSMENT
[FreeTextEntry1] : 75 y/o male with pmh of CAD, s/p MIx 3, s/p CABG, Ischemic CM/CHF s/p BiV AICD 2016, DM,PAF \par \par \par \par AICD interrogation today: Normal CRT-D function. Battery 2.7 years to FLYNN , MS <1% . PAF \par BiV paced 99%. No pulmonary congestion. Thoracic impedance is stable. \par \par \par He reports c/w meds . On Eliquis 2.5 mg BID for stroke prevention. Denies s/s bleeding.  \par \par Enrolled in remote monitoring services and transmitting appropriately. \par \par \par \par Plan \par -Continue current medications\par -Continue remote monitoring q 3 months \par -f/u with cardio/Dr. Godinez as scheduled \par -RTC in 9 months \par \par

## 2021-04-20 NOTE — HISTORY OF PRESENT ILLNESS
[de-identified] : \par Cardiologist: Dr. Godinez \par \par 77 y/o male with a BiV AICD for ICM returning for routine device interrogation. \par \par Denies chest pain, shortness of breath, dizziness, near syncope or syncope. Denies AICD shocks. \par Stable MEEHAN, ambulates with a walker. \par  \par

## 2021-04-20 NOTE — PHYSICAL EXAM
[Heart Sounds] : normal S1 and S2 [Respiration, Rhythm And Depth] : normal respiratory rhythm and effort [] : no respiratory distress [Left Infraclavicular] : left infraclavicular area [Clean] : clean [Dry] : dry [Well-Healed] : well-healed [Abdomen Soft] : soft [Bowel Sounds] : normal bowel sounds [Nail Clubbing] : no clubbing of the fingernails [Cyanosis, Localized] : no localized cyanosis

## 2021-04-20 NOTE — PROCEDURE
[See Scanned Paceart Report] : See scanned paceart report [See Device Printout] : See device printout [Programmed for Longevity] : output reprogrammed for improved battery longevity [de-identified] : [employer] [de-identified] : [id number] [de-identified] : [id number] [de-identified] : 6/2/2016 [de-identified] : 60 [de-identified] : AP- 1.5%\par  - 99%\par Normal BiV device function.\par brief PAF, symptomatic

## 2021-05-14 ENCOUNTER — APPOINTMENT (OUTPATIENT)
Dept: CARDIOLOGY | Facility: CLINIC | Age: 76
End: 2021-05-14
Payer: MEDICARE

## 2021-05-14 ENCOUNTER — NON-APPOINTMENT (OUTPATIENT)
Age: 76
End: 2021-05-14

## 2021-05-14 PROCEDURE — 93295 DEV INTERROG REMOTE 1/2/MLT: CPT

## 2021-05-14 PROCEDURE — 93296 REM INTERROG EVL PM/IDS: CPT

## 2021-05-18 ENCOUNTER — APPOINTMENT (OUTPATIENT)
Dept: CARDIOLOGY | Facility: CLINIC | Age: 76
End: 2021-05-18

## 2021-07-26 ENCOUNTER — INPATIENT (INPATIENT)
Facility: HOSPITAL | Age: 76
LOS: 1 days | Discharge: HOME | End: 2021-07-28
Attending: HOSPITALIST | Admitting: HOSPITALIST
Payer: MEDICARE

## 2021-07-26 VITALS
OXYGEN SATURATION: 100 % | DIASTOLIC BLOOD PRESSURE: 74 MMHG | TEMPERATURE: 98 F | SYSTOLIC BLOOD PRESSURE: 142 MMHG | HEART RATE: 80 BPM | RESPIRATION RATE: 16 BRPM | WEIGHT: 225.09 LBS

## 2021-07-26 DIAGNOSIS — Z95.810 PRESENCE OF AUTOMATIC (IMPLANTABLE) CARDIAC DEFIBRILLATOR: Chronic | ICD-10-CM

## 2021-07-26 DIAGNOSIS — Z95.0 PRESENCE OF CARDIAC PACEMAKER: Chronic | ICD-10-CM

## 2021-07-26 DIAGNOSIS — Z95.1 PRESENCE OF AORTOCORONARY BYPASS GRAFT: Chronic | ICD-10-CM

## 2021-07-26 LAB
ALBUMIN SERPL ELPH-MCNC: 3.8 G/DL — SIGNIFICANT CHANGE UP (ref 3.5–5.2)
ALP SERPL-CCNC: 47 U/L — SIGNIFICANT CHANGE UP (ref 30–115)
ALT FLD-CCNC: 12 U/L — SIGNIFICANT CHANGE UP (ref 0–41)
ANION GAP SERPL CALC-SCNC: 9 MMOL/L — SIGNIFICANT CHANGE UP (ref 7–14)
APTT BLD: 40.1 SEC — HIGH (ref 27–39.2)
AST SERPL-CCNC: 17 U/L — SIGNIFICANT CHANGE UP (ref 0–41)
BASE EXCESS BLDV CALC-SCNC: 3.2 MMOL/L — HIGH (ref -2–2)
BASOPHILS # BLD AUTO: 0.05 K/UL — SIGNIFICANT CHANGE UP (ref 0–0.2)
BASOPHILS NFR BLD AUTO: 0.5 % — SIGNIFICANT CHANGE UP (ref 0–1)
BILIRUB SERPL-MCNC: 0.4 MG/DL — SIGNIFICANT CHANGE UP (ref 0.2–1.2)
BUN SERPL-MCNC: 35 MG/DL — HIGH (ref 10–20)
CA-I SERPL-SCNC: 1.23 MMOL/L — SIGNIFICANT CHANGE UP (ref 1.12–1.3)
CALCIUM SERPL-MCNC: 8.9 MG/DL — SIGNIFICANT CHANGE UP (ref 8.5–10.1)
CHLORIDE SERPL-SCNC: 107 MMOL/L — SIGNIFICANT CHANGE UP (ref 98–110)
CO2 SERPL-SCNC: 26 MMOL/L — SIGNIFICANT CHANGE UP (ref 17–32)
CREAT SERPL-MCNC: 2 MG/DL — HIGH (ref 0.7–1.5)
EOSINOPHIL # BLD AUTO: 0.42 K/UL — SIGNIFICANT CHANGE UP (ref 0–0.7)
EOSINOPHIL NFR BLD AUTO: 4.4 % — SIGNIFICANT CHANGE UP (ref 0–8)
GAS PNL BLDV: 143 MMOL/L — SIGNIFICANT CHANGE UP (ref 136–145)
GAS PNL BLDV: SIGNIFICANT CHANGE UP
GLUCOSE SERPL-MCNC: 167 MG/DL — HIGH (ref 70–99)
HCO3 BLDV-SCNC: 29 MMOL/L — SIGNIFICANT CHANGE UP (ref 22–29)
HCT VFR BLD CALC: 41.3 % — LOW (ref 42–52)
HCT VFR BLDA CALC: 42.1 % — SIGNIFICANT CHANGE UP (ref 34–44)
HGB BLD CALC-MCNC: 13.7 G/DL — LOW (ref 14–18)
HGB BLD-MCNC: 13.1 G/DL — LOW (ref 14–18)
IMM GRANULOCYTES NFR BLD AUTO: 0.5 % — HIGH (ref 0.1–0.3)
INR BLD: 1.3 RATIO — SIGNIFICANT CHANGE UP (ref 0.65–1.3)
LACTATE BLDV-MCNC: 1.2 MMOL/L — SIGNIFICANT CHANGE UP (ref 0.5–1.6)
LIDOCAIN IGE QN: 43 U/L — SIGNIFICANT CHANGE UP (ref 7–60)
LYMPHOCYTES # BLD AUTO: 2.31 K/UL — SIGNIFICANT CHANGE UP (ref 1.2–3.4)
LYMPHOCYTES # BLD AUTO: 24.5 % — SIGNIFICANT CHANGE UP (ref 20.5–51.1)
MAGNESIUM SERPL-MCNC: 1.8 MG/DL — SIGNIFICANT CHANGE UP (ref 1.8–2.4)
MCHC RBC-ENTMCNC: 27.2 PG — SIGNIFICANT CHANGE UP (ref 27–31)
MCHC RBC-ENTMCNC: 31.7 G/DL — LOW (ref 32–37)
MCV RBC AUTO: 85.9 FL — SIGNIFICANT CHANGE UP (ref 80–94)
MONOCYTES # BLD AUTO: 0.79 K/UL — HIGH (ref 0.1–0.6)
MONOCYTES NFR BLD AUTO: 8.4 % — SIGNIFICANT CHANGE UP (ref 1.7–9.3)
NEUTROPHILS # BLD AUTO: 5.82 K/UL — SIGNIFICANT CHANGE UP (ref 1.4–6.5)
NEUTROPHILS NFR BLD AUTO: 61.7 % — SIGNIFICANT CHANGE UP (ref 42.2–75.2)
NRBC # BLD: 0 /100 WBCS — SIGNIFICANT CHANGE UP (ref 0–0)
NT-PROBNP SERPL-SCNC: 5171 PG/ML — HIGH (ref 0–300)
PCO2 BLDV: 48 MMHG — SIGNIFICANT CHANGE UP (ref 41–51)
PH BLDV: 7.39 — SIGNIFICANT CHANGE UP (ref 7.26–7.43)
PLATELET # BLD AUTO: 225 K/UL — SIGNIFICANT CHANGE UP (ref 130–400)
PO2 BLDV: 43 MMHG — HIGH (ref 20–40)
POTASSIUM BLDV-SCNC: 4.1 MMOL/L — SIGNIFICANT CHANGE UP (ref 3.3–5.6)
POTASSIUM SERPL-MCNC: 4.3 MMOL/L — SIGNIFICANT CHANGE UP (ref 3.5–5)
POTASSIUM SERPL-SCNC: 4.3 MMOL/L — SIGNIFICANT CHANGE UP (ref 3.5–5)
PROT SERPL-MCNC: 5.7 G/DL — LOW (ref 6–8)
PROTHROM AB SERPL-ACNC: 14.9 SEC — HIGH (ref 9.95–12.87)
RBC # BLD: 4.81 M/UL — SIGNIFICANT CHANGE UP (ref 4.7–6.1)
RBC # FLD: 14 % — SIGNIFICANT CHANGE UP (ref 11.5–14.5)
SAO2 % BLDV: 77 % — SIGNIFICANT CHANGE UP
SARS-COV-2 RNA SPEC QL NAA+PROBE: SIGNIFICANT CHANGE UP
SODIUM SERPL-SCNC: 142 MMOL/L — SIGNIFICANT CHANGE UP (ref 135–146)
TROPONIN T SERPL-MCNC: 0.02 NG/ML — HIGH
WBC # BLD: 9.44 K/UL — SIGNIFICANT CHANGE UP (ref 4.8–10.8)
WBC # FLD AUTO: 9.44 K/UL — SIGNIFICANT CHANGE UP (ref 4.8–10.8)

## 2021-07-26 PROCEDURE — 99223 1ST HOSP IP/OBS HIGH 75: CPT

## 2021-07-26 PROCEDURE — 71045 X-RAY EXAM CHEST 1 VIEW: CPT | Mod: 26

## 2021-07-26 PROCEDURE — 99285 EMERGENCY DEPT VISIT HI MDM: CPT | Mod: CS,GC

## 2021-07-26 PROCEDURE — 93010 ELECTROCARDIOGRAM REPORT: CPT

## 2021-07-26 RX ORDER — ASPIRIN/CALCIUM CARB/MAGNESIUM 324 MG
81 TABLET ORAL DAILY
Refills: 0 | Status: DISCONTINUED | OUTPATIENT
Start: 2021-07-26 | End: 2021-07-28

## 2021-07-26 RX ORDER — ATORVASTATIN CALCIUM 80 MG/1
40 TABLET, FILM COATED ORAL AT BEDTIME
Refills: 0 | Status: DISCONTINUED | OUTPATIENT
Start: 2021-07-26 | End: 2021-07-28

## 2021-07-26 RX ORDER — DEXTROSE 50 % IN WATER 50 %
25 SYRINGE (ML) INTRAVENOUS ONCE
Refills: 0 | Status: DISCONTINUED | OUTPATIENT
Start: 2021-07-26 | End: 2021-07-28

## 2021-07-26 RX ORDER — DEXTROSE 50 % IN WATER 50 %
12.5 SYRINGE (ML) INTRAVENOUS ONCE
Refills: 0 | Status: DISCONTINUED | OUTPATIENT
Start: 2021-07-26 | End: 2021-07-28

## 2021-07-26 RX ORDER — RANOLAZINE 500 MG/1
500 TABLET, FILM COATED, EXTENDED RELEASE ORAL
Refills: 0 | Status: DISCONTINUED | OUTPATIENT
Start: 2021-07-26 | End: 2021-07-28

## 2021-07-26 RX ORDER — INSULIN GLARGINE 100 [IU]/ML
42 INJECTION, SOLUTION SUBCUTANEOUS
Qty: 0 | Refills: 0 | DISCHARGE

## 2021-07-26 RX ORDER — CHLORHEXIDINE GLUCONATE 213 G/1000ML
1 SOLUTION TOPICAL DAILY
Refills: 0 | Status: DISCONTINUED | OUTPATIENT
Start: 2021-07-26 | End: 2021-07-28

## 2021-07-26 RX ORDER — METOPROLOL TARTRATE 50 MG
25 TABLET ORAL DAILY
Refills: 0 | Status: DISCONTINUED | OUTPATIENT
Start: 2021-07-26 | End: 2021-07-28

## 2021-07-26 RX ORDER — APIXABAN 2.5 MG/1
2.5 TABLET, FILM COATED ORAL
Refills: 0 | Status: DISCONTINUED | OUTPATIENT
Start: 2021-07-27 | End: 2021-07-28

## 2021-07-26 RX ORDER — SACUBITRIL AND VALSARTAN 24; 26 MG/1; MG/1
1 TABLET, FILM COATED ORAL
Refills: 0 | Status: DISCONTINUED | OUTPATIENT
Start: 2021-07-26 | End: 2021-07-28

## 2021-07-26 RX ORDER — DEXTROSE 50 % IN WATER 50 %
15 SYRINGE (ML) INTRAVENOUS ONCE
Refills: 0 | Status: DISCONTINUED | OUTPATIENT
Start: 2021-07-26 | End: 2021-07-28

## 2021-07-26 RX ORDER — GLUCAGON INJECTION, SOLUTION 0.5 MG/.1ML
1 INJECTION, SOLUTION SUBCUTANEOUS ONCE
Refills: 0 | Status: DISCONTINUED | OUTPATIENT
Start: 2021-07-26 | End: 2021-07-28

## 2021-07-26 RX ORDER — FOLIC ACID 0.8 MG
1 TABLET ORAL DAILY
Refills: 0 | Status: DISCONTINUED | OUTPATIENT
Start: 2021-07-26 | End: 2021-07-28

## 2021-07-26 RX ORDER — METOPROLOL TARTRATE 50 MG
50 TABLET ORAL DAILY
Refills: 0 | Status: DISCONTINUED | OUTPATIENT
Start: 2021-07-26 | End: 2021-07-26

## 2021-07-26 RX ORDER — SODIUM CHLORIDE 9 MG/ML
1000 INJECTION, SOLUTION INTRAVENOUS
Refills: 0 | Status: DISCONTINUED | OUTPATIENT
Start: 2021-07-26 | End: 2021-07-28

## 2021-07-26 RX ORDER — METOPROLOL TARTRATE 50 MG
50 TABLET ORAL EVERY 24 HOURS
Refills: 0 | Status: DISCONTINUED | OUTPATIENT
Start: 2021-07-27 | End: 2021-07-28

## 2021-07-26 RX ORDER — RANOLAZINE 500 MG/1
1 TABLET, FILM COATED, EXTENDED RELEASE ORAL
Qty: 0 | Refills: 0 | DISCHARGE

## 2021-07-26 RX ORDER — TAMSULOSIN HYDROCHLORIDE 0.4 MG/1
1 CAPSULE ORAL
Qty: 0 | Refills: 0 | DISCHARGE

## 2021-07-26 RX ORDER — FUROSEMIDE 40 MG
40 TABLET ORAL DAILY
Refills: 0 | Status: DISCONTINUED | OUTPATIENT
Start: 2021-07-26 | End: 2021-07-26

## 2021-07-26 RX ORDER — METOPROLOL TARTRATE 50 MG
25 TABLET ORAL AT BEDTIME
Refills: 0 | Status: DISCONTINUED | OUTPATIENT
Start: 2021-07-26 | End: 2021-07-26

## 2021-07-26 RX ORDER — LEVOTHYROXINE SODIUM 125 MCG
200 TABLET ORAL DAILY
Refills: 0 | Status: DISCONTINUED | OUTPATIENT
Start: 2021-07-26 | End: 2021-07-28

## 2021-07-26 RX ORDER — INSULIN LISPRO 100/ML
6 VIAL (ML) SUBCUTANEOUS
Refills: 0 | Status: DISCONTINUED | OUTPATIENT
Start: 2021-07-26 | End: 2021-07-28

## 2021-07-26 RX ORDER — ACETAMINOPHEN 500 MG
975 TABLET ORAL ONCE
Refills: 0 | Status: COMPLETED | OUTPATIENT
Start: 2021-07-26 | End: 2021-07-26

## 2021-07-26 RX ORDER — FENOFIBRATE,MICRONIZED 130 MG
160 CAPSULE ORAL DAILY
Refills: 0 | Status: DISCONTINUED | OUTPATIENT
Start: 2021-07-26 | End: 2021-07-27

## 2021-07-26 RX ORDER — HEPARIN SODIUM 5000 [USP'U]/ML
5000 INJECTION INTRAVENOUS; SUBCUTANEOUS EVERY 8 HOURS
Refills: 0 | Status: DISCONTINUED | OUTPATIENT
Start: 2021-07-26 | End: 2021-07-26

## 2021-07-26 RX ORDER — INSULIN GLARGINE 100 [IU]/ML
20 INJECTION, SOLUTION SUBCUTANEOUS AT BEDTIME
Refills: 0 | Status: DISCONTINUED | OUTPATIENT
Start: 2021-07-26 | End: 2021-07-28

## 2021-07-26 RX ORDER — TAMSULOSIN HYDROCHLORIDE 0.4 MG/1
0.4 CAPSULE ORAL AT BEDTIME
Refills: 0 | Status: DISCONTINUED | OUTPATIENT
Start: 2021-07-26 | End: 2021-07-28

## 2021-07-26 RX ORDER — FUROSEMIDE 40 MG
40 TABLET ORAL EVERY 12 HOURS
Refills: 0 | Status: DISCONTINUED | OUTPATIENT
Start: 2021-07-27 | End: 2021-07-28

## 2021-07-26 RX ORDER — THIAMINE MONONITRATE (VIT B1) 100 MG
100 TABLET ORAL DAILY
Refills: 0 | Status: DISCONTINUED | OUTPATIENT
Start: 2021-07-26 | End: 2021-07-28

## 2021-07-26 RX ORDER — ISOSORBIDE MONONITRATE 60 MG/1
30 TABLET, EXTENDED RELEASE ORAL DAILY
Refills: 0 | Status: DISCONTINUED | OUTPATIENT
Start: 2021-07-26 | End: 2021-07-28

## 2021-07-26 RX ORDER — ATORVASTATIN CALCIUM 80 MG/1
1 TABLET, FILM COATED ORAL
Qty: 0 | Refills: 0 | DISCHARGE

## 2021-07-26 RX ORDER — FUROSEMIDE 40 MG
40 TABLET ORAL ONCE
Refills: 0 | Status: COMPLETED | OUTPATIENT
Start: 2021-07-26 | End: 2021-07-26

## 2021-07-26 RX ORDER — INSULIN LISPRO 100/ML
VIAL (ML) SUBCUTANEOUS
Refills: 0 | Status: DISCONTINUED | OUTPATIENT
Start: 2021-07-26 | End: 2021-07-28

## 2021-07-26 RX ORDER — MAGNESIUM SULFATE 500 MG/ML
2 VIAL (ML) INJECTION ONCE
Refills: 0 | Status: COMPLETED | OUTPATIENT
Start: 2021-07-26 | End: 2021-07-26

## 2021-07-26 RX ORDER — FENOFIBRATE,MICRONIZED 130 MG
1 CAPSULE ORAL
Qty: 0 | Refills: 0 | DISCHARGE

## 2021-07-26 RX ORDER — FAMOTIDINE 10 MG/ML
40 INJECTION INTRAVENOUS DAILY
Refills: 0 | Status: DISCONTINUED | OUTPATIENT
Start: 2021-07-26 | End: 2021-07-28

## 2021-07-26 RX ADMIN — Medication 975 MILLIGRAM(S): at 22:35

## 2021-07-26 RX ADMIN — Medication 975 MILLIGRAM(S): at 22:19

## 2021-07-26 RX ADMIN — Medication 40 MILLIGRAM(S): at 21:12

## 2021-07-26 RX ADMIN — Medication 25 GRAM(S): at 22:42

## 2021-07-26 RX ADMIN — HEPARIN SODIUM 5000 UNIT(S): 5000 INJECTION INTRAVENOUS; SUBCUTANEOUS at 21:12

## 2021-07-26 NOTE — ED PROVIDER NOTE - NS ED ROS FT
Constitutional:  (-) fever, (-) chills, (-) lethargy  Eyes:  (-) eye pain (-) visual changes  ENMT: (-) nasal discharge, (-) sore throat. (-) neck pain or stiffness  Cardiac: (+) chest pain (-) palpitations (+) dyspnea  Respiratory:  (-) cough (-) respiratory distress.   GI:  (-) nausea (-) vomiting (-) diarrhea (-) abdominal pain.  :  (-) dysuria (-) frequency (-) burning.  MS:  (-) back pain (-) joint pain.  Neuro:  (-) headache (-) numbness (-) tingling (-) focal weakness  Skin:  (-) rash  Except as documented in the HPI,  all other systems are negative

## 2021-07-26 NOTE — ED PROVIDER NOTE - CARE PLAN
Principal Discharge DX:	Chest pain  Secondary Diagnosis:	Abnormal EKG  Secondary Diagnosis:	Elevated troponin  Secondary Diagnosis:	Shortness of breath   Principal Discharge DX:	Chest pain  Secondary Diagnosis:	Abnormal EKG  Secondary Diagnosis:	Elevated troponin  Secondary Diagnosis:	Shortness of breath  Secondary Diagnosis:	CHF (congestive heart failure)

## 2021-07-26 NOTE — ED PROVIDER NOTE - OBJECTIVE STATEMENT
77 yo M with PMH of CHF, HTN, HLD, DM, CAD s/p CABG and AICD 4 years ago on eliquis presenting for shortness of breath and constant, sharp, non-radiating chest pain since yesterday. Denies associated headache, vision changes, dizziness, nausea, vomiting, numbness, weakness, tingling, dysuria, hematuria, melena, hematochezia. 77 yo M with PMH of CHF, HTN, HLD, DM, CAD s/p CABG and AICD 4 years ago on eliquis presenting for shortness of breath and constant, sharp, non-radiating chest pain since yesterday. Denies associated headache, vision changes, dizziness, nausea, vomiting, numbness, weakness, tingling, dysuria, hematuria, melena, hematochezia. Follows Dr. Porter for cardiology. 75 yo M with PMH of CHF (EF 26% EF), HTN, HLD, DM, CAD s/p CABG and AICD 4 years ago on eliquis presenting for shortness of breath and constant, sharp, non-radiating chest pain since yesterday. Denies associated headache, vision changes, dizziness, nausea, vomiting, numbness, weakness, tingling, dysuria, hematuria, melena, hematochezia. Follows Dr. Porter for cardiology.

## 2021-07-26 NOTE — H&P ADULT - NSHPREVIEWOFSYSTEMS_GEN_ALL_CORE
REVIEW OF SYSTEMS      General:	 discomfort, malaise    Respiratory and Thorax: chest pain oppressive intermittent worsening over the time SOB  	  Cardiovascular:	none    Gastrointestinal:	vomiting    Genitourinary:	none    Musculoskeletal:	none    Neurological:	none    Psychiatric: none

## 2021-07-26 NOTE — ED PROVIDER NOTE - ATTENDING CONTRIBUTION TO CARE
76 y.o. M PMH of CHF HTN HLD CAD s/p CABG AICD on eliquis pw shortness of breath and CP x3 days. Pain first started saturday and has been worsening. Pt noted that the pain sharp non radiating mid sternal is worse when he sits up but better when he lays down. no headache no vision changes dizziness, vomiting, numbness, weakness, tingling, dysuria, hematuria, melena, hematochezia. Follows Dr. Porter for cardiology.    Well appearing, NAD, non toxic. NCAT PERRLA EOMI neck supple non tender normal wob cta bl rrr abdomen s nt nd no rebound no guarding [id number] neuro non focal    Plan; EKG Labs

## 2021-07-26 NOTE — H&P ADULT - CONVERSATION DETAILS
Case discussed with the patient and with his family member at bedside. Patient's advanced heart failure & his current exacerbation discussed. Questions answered. Concerns addressed.     Pt and his health care agent stated that they wish for the patient to be full code at this time. They want for him to receive CPR and to proceed with a trial of intubation if necessary.     Pt's wife stated that in the past they both indicated that they did not wish to be kept alive artificially on machines. She said that they have paperwork that they signed with a  indicating as such. I asked for these papers to be brought in if possible.     Given the aforementioned, the patient will be full code at this time.

## 2021-07-26 NOTE — H&P ADULT - NSHPPHYSICALEXAM_GEN_ALL_CORE
PHYSICAL EXAM:      Constitutional: in pain, obese NAD    Neck: JVD + wide neck    Back: hyperkeratotic rash    Respiratory: Bilateral Crackles basal, reduced breath sounds    Cardiovascular: regular rate and rhythm no audible murmur    Gastrointestinal: abdomen soft non tender positive bs    Extremities: B/L LE pitting edema 2+ reaching knees    Neurological: grossly intact  Alert and oriented X 3

## 2021-07-26 NOTE — H&P ADULT - NSHPLABSRESULTS_GEN_ALL_CORE
Serum Pro-Brain Natriuretic Peptide: 5171 pg/mL (07.26.21 @ 19:19)  Troponin T, Serum: 0.02 ng/mL (07.26.21 @ 19:19)    < from: 12 Lead ECG (07.26.21 @ 18:50) >      Diagnosis Line AV dual-paced rhythm with occasional ventricular-paced complexes and Premature  supraventricular complexes  Abnormal ECG

## 2021-07-26 NOTE — ED ADULT NURSE NOTE - CHIEF COMPLAINT QUOTE
Pt c/o left sided chest pain since yesterday, was sent in by PMD. Pt is diaphoretic and SOB in triage. Pt had bypass surgery in the past. Pt brought to crit.

## 2021-07-26 NOTE — H&P ADULT - NSICDXPASTMEDICALHX_GEN_ALL_CORE_FT
PAST MEDICAL HISTORY:  Coronary artery disease with other form of angina pectoris     HFrEF (heart failure with reduced ejection fraction)     High cholesterol     Hypertension, unspecified type     Hypothyroidism, unspecified type     Type 2 diabetes mellitus with other neurologic complication, with long-term current use of insulin

## 2021-07-26 NOTE — ED ADULT NURSE NOTE - PMH
Coronary artery disease with other form of angina pectoris    High cholesterol    Hypertension, unspecified type    Hypothyroidism, unspecified type    Other congestive heart failure    Type 2 diabetes mellitus with other neurologic complication, with long-term current use of insulin

## 2021-07-26 NOTE — H&P ADULT - ATTENDING COMMENTS
Patient seen and evaluated. Chart reviewed. I saw and evaluated this patient on 07/26/2021 (date of service).    CC: SOB    HPI:  75 yo M pt w/ a relevant hx of HFrEF (LVEF of 26% in the past and is s/p CRT-D) & CAD (s/p CABG in 2016) now p/w SOB associated w/ chest pain. Pt states that for the past three to four days, he has had progressively worsening exertional dyspnea. He gets SOB with minimal exertion (even going to the bathroom leaves him extremely winded) and then he develops chest discomfort (described as a tightness, non-radiating, located in a band like pattern across the lower chest b/l, precipitated by exertion, alleviated by APAP and rest, worsened by deep breathing). ROS pertinent positive for LE swelling and orthopnea (has been unable to lie down at times due to the discomfort). Pertinent negative for coughing, sputum production, fever and wheezing.     ROS:  Constitutional: no fevers; no chills  Eyes: no conjunctivitis; no itching  ENT: no dysphagia; no odynophagia  CVS: +orthopnea; +chest pain; +LE swelling  Resp: +SOB; no coughing  GI: no nausea; no vomiting; no diarrhea; no abd pain  : no dysuria; no hematuria; +frequency (attributes this to furosemide)  MSK: +generalized LE aches/pains (mild)  Skin: +chronic skin changes  Neuro: no headache  All other systems reviewed and are negative    PMHx, home medications, SurHx, FHx and Social history as above in the corresponding sections of the note - reviewed and edited where appropriate    Exam:  Vitals: BP = 122/71; P = 66; T = 98; RR = 18; SpO2 > 95 on 2 L/min via NC  General: appears stated age; cooperative  Eyes: anicteric sclera; moist conjunctiva; PERRL; EOMI  HENT: NC/AT; clear oropharynx w/ moist mucous membranes; nL hard/soft palate  Neck: supple w/ FROM; trachea midline  Lungs: no accessory muscle use, wheezing or rhonci; +labored breathing after exertion; +rales bibasilar  CVS: RRR; S1 and S2 w/o murmurs or rubs; +S3 gallop at the apex  Abd: BS+; soft; non-tender to palpation x 4; no masses or HSM  Ext: +peripheral edema; pulses palpable distally  Skin: warm; dry; intact; no pallor; no jaundice  Neuro: CN II-XII intact; able to move all extremities; ambulates w/ cane  Psych: appropriate affect; alert and oriented to person, place and situation    Labs reviewed: BUN/Cr 35/2, , trop 0.02 x 2, proBNP 5,171  Imaging (CXR personally reviewed): cardiomegaly; opacities left base   EKG (tracing personally reviewed): AV dual paced rhythm    Assessment:  (1) Acute on chronic HFrEF  (2) CAD (s/p CABG) - chest pain w/ exertion (stable angina)  (3) Possible OVI on CKD 3b (baseline Cr ~ 1.7-1.8)  (4) DM w/ hyperglycemia  (5) Hx of bullous pemphigoid - no acute complaints  (6) Hx of HTN and dyslipidemia - chronic  (7) Hx of paroxysmal A-fib ([id number] > 4)  --- His spouse tells me that he takes apixaban 2.5 mg once daily  --- 2.5 mg bid is already a reduced dose    Plan:  (1) Strict I&O; furosemide 40 mg IV bid; TTE  (2) Serial enzymes, EKG's  (3) C/w anti-anginals: metoprolol, isosorbide, ranolazine  (4) Monitor urine output; daily BUN/Cr; dose meds renally  (5) BG POC qACHS; basal bolus insulin; goal random BG < 180  (6) Cardiology evaluation - requested  (7) Meds as dosed - reviewed & edited where appropriate  --- Will continue apixaban at 2.5 mg daily for now  --- Can discuss this further with his Cardiologist  (8) Supportive measures  (9) Dispo: anticipate a need for 48-72 hrs of in-pt care  --- Pending adequate diuresis and symptom improvement  --- Pending TTE  --- Pending Cardiology eval    Full code    Case and plan of care was discussed with the patient and with his spouse Ms. Hilary Carlson at bedside at length. All medications (as ordered) were reviewed with the patient and with his spouse. Goals of care were discussed and are as detailed above. All questions were answered and all concerns were addressed as they arose. I saw and evaluated this patient on 07/26/2021 (date of service).    CC: SOB    HPI:  75 yo M pt w/ a relevant hx of HFrEF (LVEF of 26% in the past and is s/p CRT-D) & CAD (s/p CABG in 2016) now p/w SOB associated w/ chest pain. Pt states that for the past three to four days, he has had progressively worsening exertional dyspnea. He gets SOB with minimal exertion (even going to the bathroom leaves him extremely winded) and then he develops chest discomfort (described as a tightness, non-radiating, located in a band like pattern across the lower chest b/l, precipitated by exertion, alleviated by APAP and rest, worsened by deep breathing). ROS pertinent positive for LE swelling and orthopnea (has been unable to lie down at times due to the discomfort). Pertinent negative for coughing, sputum production, fever and wheezing.     ROS:  Constitutional: no fevers; no chills  Eyes: no conjunctivitis; no itching  ENT: no dysphagia; no odynophagia  CVS: +orthopnea; +chest pain; +LE swelling  Resp: +SOB; no coughing  GI: no nausea; no vomiting; no diarrhea; no abd pain  : no dysuria; no hematuria; +frequency (attributes this to furosemide)  MSK: +generalized LE aches/pains (mild)  Skin: +chronic skin changes  Neuro: no headache  All other systems reviewed and are negative    PMHx, home medications, SurHx, FHx and Social history as above in the corresponding sections of the note - reviewed and edited where appropriate    Exam:  Vitals: BP = 122/71; P = 66; T = 98; RR = 18; SpO2 > 95 on 2 L/min via NC  General: appears stated age; cooperative  Eyes: anicteric sclera; moist conjunctiva; PERRL; EOMI  HENT: NC/AT; clear oropharynx w/ moist mucous membranes; nL hard/soft palate  Neck: supple w/ FROM; trachea midline  Lungs: no accessory muscle use, wheezing or rhonci; +labored breathing after exertion; +rales bibasilar  CVS: RRR; S1 and S2 w/o murmurs or rubs; +S3 gallop at the apex  Abd: BS+; soft; non-tender to palpation x 4; no masses or HSM  Ext: +peripheral edema; pulses palpable distally  Skin: warm; dry; intact; no pallor; no jaundice  Neuro: CN II-XII intact; able to move all extremities; ambulates w/ cane  Psych: appropriate affect; alert and oriented to person, place and situation    Labs reviewed: BUN/Cr 35/2, , trop 0.02 x 2, proBNP 5,171  Imaging (CXR personally reviewed): cardiomegaly; opacities left base   EKG (tracing personally reviewed): AV dual paced rhythm    Assessment:  (1) Acute on chronic HFrEF  (2) CAD (s/p CABG) - chest pain w/ exertion (stable angina)  (3) Possible OVI on CKD 3b (baseline Cr ~ 1.7-1.8)  (4) DM w/ hyperglycemia  (5) Hx of bullous pemphigoid - no acute complaints  (6) Hx of HTN and dyslipidemia - chronic  (7) Hx of paroxysmal A-fib ([id number] > 4)  --- His spouse tells me that he takes apixaban 2.5 mg once daily  --- 2.5 mg bid is a reduced dose (I discussed this at length w/ pt & spouse)  --- Pt states this change was made by out-pt MD (wants to c/w this dose)  --- Will continue for now; can request Cardiology input  (8) Hx of hypothyroidism (clinically euthyroid on supplementation)  (9) Hx of BPH - no acute concerns    Plan:  (1) Strict I&O; furosemide 40 mg IV bid; TTE  (2) Serial enzymes, EKG's  (3) C/w anti-anginals: metoprolol, isosorbide, ranolazine  (4) Monitor urine output; daily BUN/Cr; dose meds renally  (5) BG POC qACHS; basal bolus insulin; goal random BG < 180  (6) Cardiology evaluation - requested  (7) Meds as dosed - reviewed & edited where appropriate  --- Will continue apixaban at 2.5 mg daily for now  --- Can discuss this further with his Cardiologist  (8) Supportive measures  (9) Dispo: anticipate a need for 48-72 hrs of in-pt care  --- Pending adequate diuresis and symptom improvement  --- Pending TTE  --- Pending Cardiology eval    Full code    Case and plan of care was discussed with the patient and with his spouse Ms. Hilary Carlson at bedside at length. All medications (as ordered) were reviewed with the patient and with his spouse. Goals of care were discussed and are as detailed above. All questions were answered and all concerns were addressed as they arose.

## 2021-07-26 NOTE — ED PROVIDER NOTE - CLINICAL SUMMARY MEDICAL DECISION MAKING FREE TEXT BOX
Pt troponin elevated 0.2, will admit to telemetry for further monitoring, ekg showed no new changes from last ekg

## 2021-07-26 NOTE — H&P ADULT - NSHPSOCIALHISTORY_GEN_ALL_CORE
stopped smoking 3 years ago  no alcohol consumption stopped smoking 3 years ago  no alcohol consumption  No illicit drug use

## 2021-07-26 NOTE — H&P ADULT - ASSESSMENT
This is a 76 year old M patient with past medical history of HFrEF last echo showed and EF of 26% according to the wife s/p ICD paroxysmal Afib on Eliquis CAD s/p CABG in 2016, Bullous pemphigoid s/p one dose of IVIG, Hypertension, dyslipidemia Present to the ED for shortness of breath and chest pain.    # Chest pain  # Shortness of breath  # CAD s/p CABG 2016  # H/O Myocardial Infarction  # HFrEF 26% s/p ICD  # Paroxysmal Afib    - Unstable Angina Vs HFrEF  - EKG T wave inversion paced rhythm no changes from previous EKG Follow up AM EKG  - Trop 0.02 ; FU repeat trop  - CXR bilateral opacities  - pro BNP 5000   This is a 76 year old M patient with past medical history of HFrEF last echo showed and EF of 26% according to the wife s/p ICD paroxysmal Afib on Eliquis CAD s/p CABG in 2016, Bullous pemphigoid s/p one dose of IVIG, Hypertension, dyslipidemia Present to the ED for shortness of breath and chest pain.    # Chest pain  # Shortness of breath  # CAD s/p CABG 2016  # H/O Myocardial Infarction  # HFrEF 26% s/p ICD  # Paroxysmal Afib  # Hypertension    - Unstable Angina Vs HFrEF exacerbation  - JVD , crackles, and LE edema on exam  - EKG T wave inversion paced rhythm no changes from previous EKG Follow up AM EKG  - Trop 0.02 ; FU repeat trop  - CXR bilateral opacities  - pro BNP 5000  - keep I < O Fluid restrict  - Keep Mg > 2 and K > 4  - Daily weight  - Cardiology Cs Dr Godinez  - cw ASA 81 mg daily  - Cw metoprolol 50-25 mg AM/PM  - Cw Imdur 30 mg daily  - Cw Entresto 24/26 q 12  - Cw lasix 40 Iv daily (patient s wife not agreeable on increasing the dose)  - cw atorvastatin 40 mg daily  - Cw Ranexa 500 q 12  - Cw eliquis 2.5 daily (?)  - [id number] score 6  - FU D-dimer    # CKD III    - progression of CKD more likely than OVI  - Creatinine baseline 1.7 in 2020  - avoid nephro toxins  - monitor BMP  - renally dosed meds  - Phosphorus AM    # DM II    - last A1c 6.2 2020  - FU A1c  - on Lantus 30 U Januvia and glipizide at home  - Start Lantus 20 U at bedtime  - Lispro 6 q 8 + corrective scale  - Avoid hypoglycemia  - monitor FS ; keep -180  - consistent carb    # Dyslipidemia    - FU lipid panel  - cw atorvastatin 40 mg daily  - Cw fenofibrate 160 mg daily  - DASH TLC diet    # BPH    - cw tamsulosin 0.4 daily    # Hypothyroidism     - Cw Levothyroxine 200 mcg daily  - FU TSH      # Suspected Folate deficiency    - cw folic acid daily      # DVT ppx Eliquis  # Diet DASH TLC CC fluid restrict  # GI ppx famotidine  # Full code    Please call the wife (health care proxy) if patient codes; Both Patient and wife are refusing CPR/ intubation but are refusing to sign MOLST form  I have explained to them that the patient is full code if papers are not signed  Patient s wife requests to be informed This is a 76 year old M patient with past medical history of HFrEF last echo showed and EF of 26% according to the wife s/p ICD paroxysmal Afib on Eliquis CAD s/p CABG in 2016, Bullous pemphigoid s/p one dose of IVIG, Hypertension, dyslipidemia Present to the ED for shortness of breath and chest pain.    # Chest pain  # Shortness of breath  # CAD s/p CABG 2016  # H/O Myocardial Infarction  # HFrEF 26% s/p ICD  # Paroxysmal Afib  # Hypertension    - Unstable Angina Vs HFrEF exacerbation  - JVD , crackles, and LE edema on exam  - EKG T wave inversion paced rhythm no changes from previous EKG Follow up AM EKG  - Trop 0.02 ; FU repeat trop  - CXR bilateral opacities  - pro BNP 5000  - keep I < O Fluid restrict  - Keep Mg > 2 and K > 4  - Daily weight  - Head of bed elevated  - Cardiology Cs Dr Godinez  - cw ASA 81 mg daily  - Cw metoprolol 50-25 mg AM/PM  - Cw Imdur 30 mg daily  - Cw Entresto 24/26 q 12  - Cw lasix 40 Iv daily (patient s wife not agreeable on increasing the dose)  - cw atorvastatin 40 mg daily  - Cw Ranexa 500 q 12  - Cw eliquis 2.5 daily (?)  - [id number] score 6  - FU D-dimer    # CKD III    - progression of CKD more likely than OVI  - Creatinine baseline 1.7 in 2020  - avoid nephro toxins  - monitor BMP  - renally dosed meds  - Phosphorus AM    # DM II    - last A1c 6.2 2020  - FU A1c  - on Lantus 30 U Januvia and glipizide at home  - Start Lantus 20 U at bedtime  - Lispro 6 q 8 + corrective scale  - Avoid hypoglycemia  - monitor FS ; keep -180  - consistent carb    # Dyslipidemia    - FU lipid panel  - cw atorvastatin 40 mg daily  - Cw fenofibrate 160 mg daily  - DASH TLC diet    # BPH    - cw tamsulosin 0.4 daily    # Hypothyroidism     - Cw Levothyroxine 200 mcg daily  - FU TSH      # Suspected Folate deficiency    - cw folic acid daily      # DVT ppx Eliquis  # Diet DASH TLC CC fluid restrict  # GI ppx famotidine  # Full code    Please call the wife (health care proxy) if patient codes; Both Patient and wife are refusing CPR/ intubation but are refusing to sign MOLST form  I have explained to them that the patient is full code if papers are not signed  Patient s wife requests to be informed This is a 76 year old M patient with past medical history of HFrEF last echo showed and EF of 26% according to the wife s/p ICD paroxysmal Afib on Eliquis CAD s/p CABG in 2016, Bullous pemphigoid s/p one dose of IVIG, Hypertension, dyslipidemia Present to the ED for shortness of breath and chest pain.    # Acute on chronic HFrEF 26% s/p ICD  # Chest pain w/ exertion possibly stable angina  # CAD s/p CABG 2016  # H/O Myocardial Infarction  # Paroxysmal Afib ([id number] score 6)  # Hypertension    - JVD , crackles, and LE edema on exam  - EKG T wave inversion paced rhythm no changes from previous EKG Follow up AM EKG  - Trop 0.02 ; FU repeat trop  - CXR bilateral opacities  - pro BNP 5000  - keep I < O Fluid restrict  - Keep Mg > 2 and K > 4  - Daily weight  - Head of bed elevated  - Cardiology Cs Dr Godinez  - cw ASA 81 mg daily  - Cw metoprolol 50-25 mg AM/PM  - Cw Imdur 30 mg daily  - Cw Entresto 24/26 q 12  - Cw lasix 40 Iv daily (patient s wife not agreeable on increasing the dose)  - cw atorvastatin 40 mg daily  - Cw Ranexa 500 q 12  - Cw eliquis 2.5 daily (?)    # CKD III    - progression of CKD more likely than OVI  - Creatinine baseline 1.7 in 2020  - avoid nephro toxins  - monitor BMP  - renally dosed meds  - Phosphorus AM    # DM II    - last A1c 6.2 2020  - FU A1c  - on Lantus 30 U Januvia and glipizide at home  - Start Lantus 20 U at bedtime  - Lispro 6 q 8 + corrective scale  - Avoid hypoglycemia  - monitor FS ; keep -180  - consistent carb    # Dyslipidemia    - FU lipid panel  - cw atorvastatin 40 mg daily  - Cw fenofibrate 160 mg daily  - DASH TLC diet    # BPH    - cw tamsulosin 0.4 daily    # Hypothyroidism     - Cw Levothyroxine 200 mcg daily  - FU TSH      # Suspected Folate deficiency    - cw folic acid daily      # DVT ppx Eliquis  # Diet DASH TLC CC fluid restrict  # GERD famotidine  # Full code (refer to goals of care section)   This is a 76 year old M patient with past medical history of HFrEF last echo showed and EF of 26% according to the wife s/p ICD paroxysmal Afib on Eliquis CAD s/p CABG in 2016, Bullous pemphigoid s/p one dose of IVIG, Hypertension, dyslipidemia Present to the ED for shortness of breath and chest pain.    # Acute on chronic HFrEF 26% s/p ICD  # Chest pain w/ exertion possibly stable angina  # CAD s/p CABG 2016  # H/O Myocardial Infarction  # Paroxysmal Afib ([id number] score 6)  # Hypertension    - JVD , crackles, and LE edema on exam  - EKG T wave inversion paced rhythm no changes from previous EKG Follow up AM EKG  - Trop 0.02 ; FU repeat trop  - CXR bilateral opacities  - pro BNP 5000  - keep I < O Fluid restrict  - Keep Mg > 2 and K > 4  - Daily weight  - Head of bed elevated  - Cardiology Cs Dr Godinez  - cw ASA 81 mg daily  - Cw metoprolol 50-25 mg AM/PM  - Cw Imdur 30 mg daily  - Cw Entresto 24/26 q 12  - Cw lasix 40 Iv daily (patient s wife not agreeable on increasing the dose)  - cw atorvastatin 40 mg daily  - Cw Ranexa 500 q 12  - Cw eliquis 2.5 daily (?)    # CKD III    - progression of CKD more likely than OVI  - Creatinine baseline 1.7 in 2020  - avoid nephro toxins  - monitor BMP  - renally dosed meds  - Phosphorus AM    # DM II    - last A1c 6.2 2020  - FU A1c  - on Lantus 30 U Januvia and glipizide at home  - Start Lantus 20 U at bedtime  - Lispro 6 q 8 + corrective scale  - Avoid hypoglycemia  - monitor FS ; keep -180  - consistent carb    # Dyslipidemia    - FU lipid panel  - cw atorvastatin 40 mg daily  - Cw fenofibrate 160 mg daily  - DASH TLC diet    # BPH    - cw tamsulosin 0.4 daily    # Hypothyroidism     - Cw Levothyroxine 200 mcg daily  - FU TSH      # Suspected Folate deficiency    - cw folic acid daily    # Morbid Obesity    - counseled about losing weight and healthy diet  - DASH TLC diet    # DVT ppx Eliquis  # Diet DASH TLC CC fluid restrict  # GERD famotidine  # Full code (refer to goals of care section)

## 2021-07-26 NOTE — ED ADULT NURSE NOTE - OBJECTIVE STATEMENT
Patient is a 76 year old male complaining of left sided chest pain since yesterday, was sent in by PMD. Pt is diaphoretic and SOB- has extensive cardiac history.

## 2021-07-26 NOTE — ED PROVIDER NOTE - PHYSICAL EXAMINATION
CONSTITUTIONAL: well-appearing, in NAD  SKIN: Warm dry, normal skin turgor  HEAD: NCAT  EYES: EOMI, PERRLA, no scleral icterus, conjunctiva pink  ENT: dry mucous membranes with no erythema or exudates  NECK: Supple; non tender. Full ROM.  CARD: RRR, no murmurs.  RESP: clear to ausculation b/l. No crackles or wheezing.  ABD: soft, non-tender, non-distended, no rebound or guarding.  EXT: Full ROM, no bony tenderness, no pedal edema, no calf tenderness  NEURO: normal motor. normal sensory. Normal gait.  PSYCH: Cooperative, appropriate.

## 2021-07-26 NOTE — H&P ADULT - HISTORY OF PRESENT ILLNESS
This is a 76 year old M patient with past medical history of HFrEF last echo showed and EF of 26% according to the wife s/p ICD paroxysmal Afib on eliquis, CAD s/p CABG in 2016, Bullous pemphigoid s/p one dose of IVIG, Hypertension, dyslipidemia Present to the ED for shortness of breath and chest pain.  History goes ba This is a 76 year old M patient with past medical history of HFrEF last echo showed and EF of 26% according to the wife s/p ICD paroxysmal Afib on eliquis, CAD s/p CABG in 2016, Bullous pemphigoid s/p one dose of IVIG, Hypertension, dyslipidemia Present to the ED for shortness of breath and chest pain.  History goes back to 3 days prior to presentation when patient started to feel chest tightness and shortness of breath that was worsening over time  He reports intermittent chest pain, oppressive , non radiating that he rate 10/10. Pain improves with tylenol and worsens with breathing  He reported worsening LE swelling and one episode of vomiting  denies cough, sputum, fever chills or diarrhea    In ED vitals WNL

## 2021-07-26 NOTE — ED ADULT TRIAGE NOTE - CHIEF COMPLAINT QUOTE
Pt c/o left sided chest pain since yesterday, was sent in by PMD. Pt had bypass surgery in the past. Pt c/o left sided chest pain since yesterday, was sent in by PMD. Pt had bypass surgery in the past. Pt brought to crit. Pt c/o left sided chest pain since yesterday, was sent in by PMD. Pt is diaphoretic and SOB in triage. Pt had bypass surgery in the past. Pt brought to crit.

## 2021-07-27 LAB
A1C WITH ESTIMATED AVERAGE GLUCOSE RESULT: 5.9 % — HIGH (ref 4–5.6)
ALBUMIN SERPL ELPH-MCNC: 3.8 G/DL — SIGNIFICANT CHANGE UP (ref 3.5–5.2)
ALP SERPL-CCNC: 42 U/L — SIGNIFICANT CHANGE UP (ref 30–115)
ALT FLD-CCNC: 12 U/L — SIGNIFICANT CHANGE UP (ref 0–41)
ANION GAP SERPL CALC-SCNC: 10 MMOL/L — SIGNIFICANT CHANGE UP (ref 7–14)
AST SERPL-CCNC: 17 U/L — SIGNIFICANT CHANGE UP (ref 0–41)
BASOPHILS # BLD AUTO: 0.03 K/UL — SIGNIFICANT CHANGE UP (ref 0–0.2)
BASOPHILS NFR BLD AUTO: 0.3 % — SIGNIFICANT CHANGE UP (ref 0–1)
BILIRUB SERPL-MCNC: 0.5 MG/DL — SIGNIFICANT CHANGE UP (ref 0.2–1.2)
BLD GP AB SCN SERPL QL: SIGNIFICANT CHANGE UP
BUN SERPL-MCNC: 36 MG/DL — HIGH (ref 10–20)
CALCIUM SERPL-MCNC: 9.2 MG/DL — SIGNIFICANT CHANGE UP (ref 8.5–10.1)
CHLORIDE SERPL-SCNC: 106 MMOL/L — SIGNIFICANT CHANGE UP (ref 98–110)
CHOLEST SERPL-MCNC: 147 MG/DL — SIGNIFICANT CHANGE UP
CO2 SERPL-SCNC: 26 MMOL/L — SIGNIFICANT CHANGE UP (ref 17–32)
COVID-19 SPIKE DOMAIN AB INTERP: NEGATIVE — SIGNIFICANT CHANGE UP
COVID-19 SPIKE DOMAIN ANTIBODY RESULT: 0.4 U/ML — SIGNIFICANT CHANGE UP
CREAT SERPL-MCNC: 1.9 MG/DL — HIGH (ref 0.7–1.5)
D DIMER BLD IA.RAPID-MCNC: 99 NG/ML DDU — SIGNIFICANT CHANGE UP (ref 0–230)
EOSINOPHIL # BLD AUTO: 0.5 K/UL — SIGNIFICANT CHANGE UP (ref 0–0.7)
EOSINOPHIL NFR BLD AUTO: 5.3 % — SIGNIFICANT CHANGE UP (ref 0–8)
ESTIMATED AVERAGE GLUCOSE: 123 MG/DL — HIGH (ref 68–114)
GLUCOSE BLDC GLUCOMTR-MCNC: 108 MG/DL — HIGH (ref 70–99)
GLUCOSE BLDC GLUCOMTR-MCNC: 117 MG/DL — HIGH (ref 70–99)
GLUCOSE BLDC GLUCOMTR-MCNC: 122 MG/DL — HIGH (ref 70–99)
GLUCOSE BLDC GLUCOMTR-MCNC: 134 MG/DL — HIGH (ref 70–99)
GLUCOSE SERPL-MCNC: 125 MG/DL — HIGH (ref 70–99)
HCT VFR BLD CALC: 44.5 % — SIGNIFICANT CHANGE UP (ref 42–52)
HDLC SERPL-MCNC: 37 MG/DL — LOW
HGB BLD-MCNC: 13.9 G/DL — LOW (ref 14–18)
IMM GRANULOCYTES NFR BLD AUTO: 0.4 % — HIGH (ref 0.1–0.3)
LIPID PNL WITH DIRECT LDL SERPL: 79 MG/DL — SIGNIFICANT CHANGE UP
LYMPHOCYTES # BLD AUTO: 2.1 K/UL — SIGNIFICANT CHANGE UP (ref 1.2–3.4)
LYMPHOCYTES # BLD AUTO: 22.5 % — SIGNIFICANT CHANGE UP (ref 20.5–51.1)
MAGNESIUM SERPL-MCNC: 2.2 MG/DL — SIGNIFICANT CHANGE UP (ref 1.8–2.4)
MCHC RBC-ENTMCNC: 27.7 PG — SIGNIFICANT CHANGE UP (ref 27–31)
MCHC RBC-ENTMCNC: 31.2 G/DL — LOW (ref 32–37)
MCV RBC AUTO: 88.8 FL — SIGNIFICANT CHANGE UP (ref 80–94)
MONOCYTES # BLD AUTO: 0.89 K/UL — HIGH (ref 0.1–0.6)
MONOCYTES NFR BLD AUTO: 9.5 % — HIGH (ref 1.7–9.3)
NEUTROPHILS # BLD AUTO: 5.79 K/UL — SIGNIFICANT CHANGE UP (ref 1.4–6.5)
NEUTROPHILS NFR BLD AUTO: 62 % — SIGNIFICANT CHANGE UP (ref 42.2–75.2)
NON HDL CHOLESTEROL: 110 MG/DL — SIGNIFICANT CHANGE UP
NRBC # BLD: 0 /100 WBCS — SIGNIFICANT CHANGE UP (ref 0–0)
PHOSPHATE SERPL-MCNC: 3.7 MG/DL — SIGNIFICANT CHANGE UP (ref 2.1–4.9)
PLATELET # BLD AUTO: 225 K/UL — SIGNIFICANT CHANGE UP (ref 130–400)
POTASSIUM SERPL-MCNC: 4.1 MMOL/L — SIGNIFICANT CHANGE UP (ref 3.5–5)
POTASSIUM SERPL-SCNC: 4.1 MMOL/L — SIGNIFICANT CHANGE UP (ref 3.5–5)
PROT SERPL-MCNC: 6 G/DL — SIGNIFICANT CHANGE UP (ref 6–8)
RBC # BLD: 5.01 M/UL — SIGNIFICANT CHANGE UP (ref 4.7–6.1)
RBC # FLD: 14.1 % — SIGNIFICANT CHANGE UP (ref 11.5–14.5)
SARS-COV-2 IGG+IGM SERPL QL IA: 0.4 U/ML — SIGNIFICANT CHANGE UP
SARS-COV-2 IGG+IGM SERPL QL IA: NEGATIVE — SIGNIFICANT CHANGE UP
SODIUM SERPL-SCNC: 142 MMOL/L — SIGNIFICANT CHANGE UP (ref 135–146)
TRIGL SERPL-MCNC: 153 MG/DL — HIGH
TROPONIN T SERPL-MCNC: 0.02 NG/ML — HIGH
TROPONIN T SERPL-MCNC: 0.02 NG/ML — HIGH
WBC # BLD: 9.35 K/UL — SIGNIFICANT CHANGE UP (ref 4.8–10.8)
WBC # FLD AUTO: 9.35 K/UL — SIGNIFICANT CHANGE UP (ref 4.8–10.8)

## 2021-07-27 PROCEDURE — 99233 SBSQ HOSP IP/OBS HIGH 50: CPT

## 2021-07-27 PROCEDURE — 93306 TTE W/DOPPLER COMPLETE: CPT | Mod: 26

## 2021-07-27 PROCEDURE — 93284 PRGRMG EVAL IMPLANTABLE DFB: CPT | Mod: 26

## 2021-07-27 PROCEDURE — 99222 1ST HOSP IP/OBS MODERATE 55: CPT

## 2021-07-27 RX ORDER — FENOFIBRATE,MICRONIZED 130 MG
145 CAPSULE ORAL AT BEDTIME
Refills: 0 | Status: DISCONTINUED | OUTPATIENT
Start: 2021-07-27 | End: 2021-07-28

## 2021-07-27 RX ADMIN — Medication 6 UNIT(S): at 08:31

## 2021-07-27 RX ADMIN — Medication 40 MILLIGRAM(S): at 17:21

## 2021-07-27 RX ADMIN — FAMOTIDINE 40 MILLIGRAM(S): 10 INJECTION INTRAVENOUS at 12:17

## 2021-07-27 RX ADMIN — RANOLAZINE 500 MILLIGRAM(S): 500 TABLET, FILM COATED, EXTENDED RELEASE ORAL at 17:22

## 2021-07-27 RX ADMIN — Medication 100 MILLIGRAM(S): at 11:42

## 2021-07-27 RX ADMIN — TAMSULOSIN HYDROCHLORIDE 0.4 MILLIGRAM(S): 0.4 CAPSULE ORAL at 21:43

## 2021-07-27 RX ADMIN — ATORVASTATIN CALCIUM 40 MILLIGRAM(S): 80 TABLET, FILM COATED ORAL at 21:43

## 2021-07-27 RX ADMIN — RANOLAZINE 500 MILLIGRAM(S): 500 TABLET, FILM COATED, EXTENDED RELEASE ORAL at 06:01

## 2021-07-27 RX ADMIN — INSULIN GLARGINE 20 UNIT(S): 100 INJECTION, SOLUTION SUBCUTANEOUS at 21:43

## 2021-07-27 RX ADMIN — Medication 6 UNIT(S): at 17:20

## 2021-07-27 RX ADMIN — SACUBITRIL AND VALSARTAN 1 TABLET(S): 24; 26 TABLET, FILM COATED ORAL at 06:01

## 2021-07-27 RX ADMIN — Medication 40 MILLIGRAM(S): at 06:01

## 2021-07-27 RX ADMIN — ISOSORBIDE MONONITRATE 30 MILLIGRAM(S): 60 TABLET, EXTENDED RELEASE ORAL at 11:44

## 2021-07-27 RX ADMIN — Medication 145 MILLIGRAM(S): at 21:43

## 2021-07-27 RX ADMIN — Medication 200 MICROGRAM(S): at 06:02

## 2021-07-27 RX ADMIN — Medication 50 MILLIGRAM(S): at 17:21

## 2021-07-27 RX ADMIN — CHLORHEXIDINE GLUCONATE 1 APPLICATION(S): 213 SOLUTION TOPICAL at 11:43

## 2021-07-27 RX ADMIN — APIXABAN 2.5 MILLIGRAM(S): 2.5 TABLET, FILM COATED ORAL at 06:01

## 2021-07-27 RX ADMIN — Medication 25 MILLIGRAM(S): at 06:01

## 2021-07-27 RX ADMIN — Medication 81 MILLIGRAM(S): at 11:40

## 2021-07-27 RX ADMIN — Medication 1 MILLIGRAM(S): at 11:40

## 2021-07-27 RX ADMIN — SACUBITRIL AND VALSARTAN 1 TABLET(S): 24; 26 TABLET, FILM COATED ORAL at 17:22

## 2021-07-27 RX ADMIN — Medication 6 UNIT(S): at 12:17

## 2021-07-27 NOTE — CHART NOTE - NSCHARTNOTEFT_GEN_A_CORE
Electrophysiology    Pts device __BiV ICD____ was interrogated on 07-27-21  Device working properly  Events: PMT event  [place]view increase consistent with fluid accumulation  Preliminary results d/w with primary team  Awaiting / Reviewed by attending    Contact EP ACP with any questions [phone]

## 2021-07-27 NOTE — PROGRESS NOTE ADULT - ATTENDING COMMENTS
Mr Carlson is a 76 year old Man patient with medical history of HFrEF last echo showed and EF of 26% according to the wife s/p ICD paroxysmal Afib on Eliquis CAD s/p CABG in 2016, Bullous pemphigoid s/p one dose of IVIG, Hypertension, dyslipidemia Present to the ED for shortness of breath and chest pain.    #Acute on chronic HFrEF 26% s/p ICD  JVD , crackles, and LE edema on exam  CXR bilateral opacities  pro BNP 5000  f/u repeat echo  c/w lasix 40 Iv bid  c/w metoprolol 50-25 mg AM/PM  c/w Entresto 24/26 q 12    #CAD s/p CABG 2016  #Chest pain w/ exertion possibly stable angina  trop 0.02 x3; - EKG T wave inversion paced rhythm no changes from previous EKG  c/w ASA 81 mg daily  c/w atorvastatin 40 mg daily  c/w Ranexa 500 q 12  c/w Imdur 30 mg daily    #Paroxysmal Afib ([id number] score 6)  c/w eliquis 2.5 daily  EP for device interrogation    #Hypertension  c/w Lasix, Lopressor    #CKD III  progression of CKD more likely than OVI  Creatinine baseline 1.7 in 2020  avoid nephro toxins  monitor BMP  renally dosed meds  Phosphorus wnl    #DM II  A1c 5.9  on Lantus 30 U Januvia and glipizide at home  Start Lantus 20 U at bedtime  Lispro 6 q 8 + corrective scale  Avoid hypoglycemia  monitor FS ; keep -180  consistent carb    #Dyslipidemia  f/u lipid panel  c/w atorvastatin 40 mg daily  c/w fenofibrate 160 mg daily  DASH TLC diet    #BPH  c/w tamsulosin 0.4 daily    #Hypothyroidism   c/w Levothyroxine 200 mcg daily  FU TSH      #Suspected Folate deficiency  c/w folic acid daily    #Morbid Obesity  counseled about losing weight and healthy diet  DASH TLC diet    DVT ppx Eliquis  Diet DASH TLC CC fluid restrict  GERD famotidine  Full code    Progress Note Handoff:  Pending: Diuresis, Interrogation, Ischemic work up?.  Dispo: acute Mr Carlson is a 76 year old Man patient with medical history of HFrEF last echo showed and EF of 26% according to the wife s/p ICD paroxysmal Afib on Eliquis CAD s/p CABG in 2016, Bullous pemphigoid s/p one dose of IVIG, Hypertension, dyslipidemia Present to the ED for shortness of breath and chest pain.    #Acute on chronic HFrEF 26% s/p ICD  JVD , crackles, and LE edema on exam  CXR bilateral opacities  pro BNP 5000  f/u repeat echo  c/w lasix 40 Iv bid  c/w metoprolol 50-25 mg AM/PM  c/w Entresto 24/26 q 12    #CAD s/p CABG 2016  #Chest pain w/ exertion possibly stable angina  trop 0.02 x3; - EKG T wave inversion paced rhythm no changes from previous EKG  c/w ASA 81 mg daily  c/w atorvastatin 40 mg daily  c/w Ranexa 500 q 12  c/w Imdur 30 mg daily    #Paroxysmal Afib ([id number] score 6)  c/w eliquis 2.5 daily  EP for device interrogation    #Hypertension  c/w Lasix, Lopressor    #CKD III  progression of CKD more likely than OVI  Creatinine baseline 1.7 in 2020  avoid nephro toxins  monitor BMP  renally dosed meds  Phosphorus wnl    #DM II  A1c 5.9  on Lantus 30 U Januvia and glipizide at home  Start Lantus 20 U at bedtime  Lispro 6 q 8 + corrective scale  Avoid hypoglycemia  monitor FS ; keep -180  consistent carb    #Dyslipidemia  f/u lipid panel  c/w atorvastatin 40 mg daily  c/w fenofibrate 160 mg daily  DASH TLC diet    #BPH  c/w tamsulosin 0.4 daily    #Hypothyroidism   c/w Levothyroxine 200 mcg daily  FU TSH      #Suspected Folate deficiency  #Suspected Thiamine Deficiency  c/w folic acid daily    #Morbid Obesity  counseled about losing weight and healthy diet  DASH TLC diet    DVT ppx Eliquis  Diet DASH TLC CC fluid restrict  GERD famotidine  Full code    Progress Note Handoff:  Pending: Diuresis, Interrogation, Ischemic work up?.  Dispo: acute

## 2021-07-27 NOTE — CONSULT NOTE ADULT - ASSESSMENT
77 y/o male with systolic CHF, acute on chronic, due to ischemic CM, CAD, s/p CABG  Presenting with CHF exacerbation.  CRI, possible OVI    C/w IV diuresis. Monitor I&O, monitor renal function.  C/w ASA, statin, BB  C/w Entresto, if renal function tolerating  Repeat 2D echo  Device interrogation to assess Optivol.  DM control.  Once stable, will consider ischemic w/u.

## 2021-07-27 NOTE — ED ADULT NURSE REASSESSMENT NOTE - NS ED NURSE REASSESS COMMENT FT1
received at 12mn, a&ox3, on full cm, sr noted, ls coarse on o2 2l nc, sob on exertion and off o2, pt encouraged to use urinal, insists on wlaking to br, walked w/ pt using cane, abd soft, nt/nd, # 18 l wrist h/l site wnl - will continue to monitor

## 2021-07-27 NOTE — CONSULT NOTE ADULT - SUBJECTIVE AND OBJECTIVE BOX
Patient is a 76y old  Male who presents with a chief complaint of chest pain (26 Jul 2021 22:00)      HPI:  This is a 76 year old M patient with past medical history of HFrEF last echo showed and EF of 26% according to the wife s/p ICD paroxysmal Afib on eliquis, CAD s/p CABG in 2016, Bullous pemphigoid s/p one dose of IVIG, Hypertension, dyslipidemia Present to the ED for shortness of breath and chest pain.  History goes back to 3 days prior to presentation when patient started to feel chest tightness and shortness of breath that was worsening over time  He reports intermittent chest pain, oppressive , non radiating that he rate 10/10. Pain improves with tylenol and worsens with breathing  He reported worsening LE swelling and one episode of vomiting  denies cough, sputum, fever chills or diarrhea    In ED vitals WNL (26 Jul 2021 22:00)      PAST MEDICAL & SURGICAL HISTORY:  Coronary artery disease with other form of angina pectoris    Type 2 diabetes mellitus with other neurologic complication, with long-term current use of insulin    Hypertension, unspecified type    High cholesterol    Hypothyroidism, unspecified type    HFrEF (heart failure with reduced ejection fraction)    Artificial cardiac pacemaker    S/P CABG x 6    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        PREVIOUS DIAGNOSTIC TESTING:      MEDICATIONS  (STANDING):  apixaban 2.5 milliGRAM(s) Oral <User Schedule>  aspirin enteric coated 81 milliGRAM(s) Oral daily  atorvastatin 40 milliGRAM(s) Oral at bedtime  chlorhexidine 4% Liquid 1 Application(s) Topical daily  dextrose 40% Gel 15 Gram(s) Oral once  dextrose 5%. 1000 milliLiter(s) (50 mL/Hr) IV Continuous <Continuous>  dextrose 5%. 1000 milliLiter(s) (100 mL/Hr) IV Continuous <Continuous>  dextrose 50% Injectable 25 Gram(s) IV Push once  dextrose 50% Injectable 12.5 Gram(s) IV Push once  dextrose 50% Injectable 25 Gram(s) IV Push once  famotidine    Tablet 40 milliGRAM(s) Oral daily  fenofibrate Tablet 160 milliGRAM(s) Oral daily  folic acid 1 milliGRAM(s) Oral daily  furosemide   Injectable 40 milliGRAM(s) IV Push every 12 hours  glucagon  Injectable 1 milliGRAM(s) IntraMuscular once  insulin glargine Injectable (LANTUS) 20 Unit(s) SubCutaneous at bedtime  insulin lispro (ADMELOG) corrective regimen sliding scale   SubCutaneous three times a day before meals  insulin lispro Injectable (ADMELOG) 6 Unit(s) SubCutaneous three times a day before meals  isosorbide   mononitrate ER Tablet (IMDUR) 30 milliGRAM(s) Oral daily  levothyroxine 200 MICROGram(s) Oral daily  metoprolol tartrate 50 milliGRAM(s) Oral every 24 hours  metoprolol tartrate 25 milliGRAM(s) Oral daily  ranolazine 500 milliGRAM(s) Oral two times a day  sacubitril 24 mG/valsartan 26 mG 1 Tablet(s) Oral two times a day  tamsulosin 0.4 milliGRAM(s) Oral at bedtime  thiamine 100 milliGRAM(s) Oral daily    MEDICATIONS  (PRN):      FAMILY HISTORY:  Family history of sinus bradycardia (Mother)        SOCIAL HISTORY:  CIGARETTES:  former smoker    ALCOHOL:  social    REVIEW OF SYSTEMS:  As per HPI, otherwise negative.      Vital Signs Last 24 Hrs  T(C): 36.7 (27 Jul 2021 05:24), Max: 36.7 (27 Jul 2021 02:09)  T(F): 98 (27 Jul 2021 05:24), Max: 98 (27 Jul 2021 02:09)  HR: 77 (27 Jul 2021 05:24) (66 - 83)  BP: 135/75 (27 Jul 2021 05:24) (122/71 - 142/74)  BP(mean): --  RR: 20 (27 Jul 2021 05:24) (16 - 20)  SpO2: 95% (27 Jul 2021 05:24) (95% - 100%)        PHYSICAL EXAM:  GENERAL: NAD, AAO x 3  HEAD:  Atraumatic, Normocephalic  EYES: EOMI, PERRL, conjunctiva and sclera clear  ENMT: Moist mucous membranes  NECK: Supple, No JVD, No bruits  NERVOUS SYSTEM:  Alert & Oriented X3, Good concentration; No focal deficits  CHEST/LUNG: C+ crackles at bases  HEART: Regular rate and rhythm; Normal S1-S2, No murmurs, rubs, or gallops  ABDOMEN: Soft, Nontender, Nondistended; Bowel sounds present  EXTREMITIES:   No clubbing, cyanosis, 1+ edema  SKIN: No rashes or lesions    INTERPRETATION OF TELEMETRY:    ECG:  < from: 12 Lead ECG (07.26.21 @ 18:50) >  AV dual-paced rhythm with occasional ventricular-paced complexes and Premature  supraventricular complexes  Abnormal ECG    < end of copied text >    I&O's Detail    26 Jul 2021 07:01  -  27 Jul 2021 07:00  --------------------------------------------------------  IN:  Total IN: 0 mL    OUT:    Voided (mL): 200 mL  Total OUT: 200 mL    Total NET: -200 mL          LABS:                        13.1   9.44  )-----------( 225      ( 26 Jul 2021 19:19 )             41.3     07-26    142  |  107  |  35<H>  ----------------------------<  167<H>  4.3   |  26  |  2.0<H>    Ca    8.9      26 Jul 2021 19:19  Mg     1.8     07-26    TPro  5.7<L>  /  Alb  3.8  /  TBili  0.4  /  DBili  x   /  AST  17  /  ALT  12  /  AlkPhos  47  07-26    CARDIAC MARKERS ( 27 Jul 2021 00:49 )  x     / 0.02 ng/mL / x     / x     / x      CARDIAC MARKERS ( 26 Jul 2021 19:19 )  x     / 0.02 ng/mL / x     / x     / x          PT/INR - ( 26 Jul 2021 19:55 )   PT: 14.90 sec;   INR: 1.30 ratio         PTT - ( 26 Jul 2021 19:55 )  PTT:40.1 sec    I&O's Summary    26 Jul 2021 07:01  -  27 Jul 2021 07:00  --------------------------------------------------------  IN: 0 mL / OUT: 200 mL / NET: -200 mL          RADIOLOGY & ADDITIONAL STUDIES:

## 2021-07-28 ENCOUNTER — TRANSCRIPTION ENCOUNTER (OUTPATIENT)
Age: 76
End: 2021-07-28

## 2021-07-28 VITALS — DIASTOLIC BLOOD PRESSURE: 74 MMHG | SYSTOLIC BLOOD PRESSURE: 152 MMHG | HEART RATE: 80 BPM

## 2021-07-28 LAB
ALBUMIN SERPL ELPH-MCNC: 3.8 G/DL — SIGNIFICANT CHANGE UP (ref 3.5–5.2)
ALP SERPL-CCNC: 46 U/L — SIGNIFICANT CHANGE UP (ref 30–115)
ALT FLD-CCNC: 12 U/L — SIGNIFICANT CHANGE UP (ref 0–41)
ANION GAP SERPL CALC-SCNC: 10 MMOL/L — SIGNIFICANT CHANGE UP (ref 7–14)
AST SERPL-CCNC: 19 U/L — SIGNIFICANT CHANGE UP (ref 0–41)
BASOPHILS # BLD AUTO: 0.04 K/UL — SIGNIFICANT CHANGE UP (ref 0–0.2)
BASOPHILS NFR BLD AUTO: 0.4 % — SIGNIFICANT CHANGE UP (ref 0–1)
BILIRUB SERPL-MCNC: 0.5 MG/DL — SIGNIFICANT CHANGE UP (ref 0.2–1.2)
BUN SERPL-MCNC: 41 MG/DL — HIGH (ref 10–20)
CALCIUM SERPL-MCNC: 9.4 MG/DL — SIGNIFICANT CHANGE UP (ref 8.5–10.1)
CHLORIDE SERPL-SCNC: 105 MMOL/L — SIGNIFICANT CHANGE UP (ref 98–110)
CO2 SERPL-SCNC: 28 MMOL/L — SIGNIFICANT CHANGE UP (ref 17–32)
CREAT SERPL-MCNC: 2.1 MG/DL — HIGH (ref 0.7–1.5)
EOSINOPHIL # BLD AUTO: 0.46 K/UL — SIGNIFICANT CHANGE UP (ref 0–0.7)
EOSINOPHIL NFR BLD AUTO: 4.3 % — SIGNIFICANT CHANGE UP (ref 0–8)
GLUCOSE BLDC GLUCOMTR-MCNC: 121 MG/DL — HIGH (ref 70–99)
GLUCOSE BLDC GLUCOMTR-MCNC: 129 MG/DL — HIGH (ref 70–99)
GLUCOSE SERPL-MCNC: 130 MG/DL — HIGH (ref 70–99)
HCT VFR BLD CALC: 45.5 % — SIGNIFICANT CHANGE UP (ref 42–52)
HGB BLD-MCNC: 14.4 G/DL — SIGNIFICANT CHANGE UP (ref 14–18)
IMM GRANULOCYTES NFR BLD AUTO: 0.4 % — HIGH (ref 0.1–0.3)
LYMPHOCYTES # BLD AUTO: 2.97 K/UL — SIGNIFICANT CHANGE UP (ref 1.2–3.4)
LYMPHOCYTES # BLD AUTO: 27.8 % — SIGNIFICANT CHANGE UP (ref 20.5–51.1)
MAGNESIUM SERPL-MCNC: 2.3 MG/DL — SIGNIFICANT CHANGE UP (ref 1.8–2.4)
MCHC RBC-ENTMCNC: 28 PG — SIGNIFICANT CHANGE UP (ref 27–31)
MCHC RBC-ENTMCNC: 31.6 G/DL — LOW (ref 32–37)
MCV RBC AUTO: 88.5 FL — SIGNIFICANT CHANGE UP (ref 80–94)
MONOCYTES # BLD AUTO: 0.83 K/UL — HIGH (ref 0.1–0.6)
MONOCYTES NFR BLD AUTO: 7.8 % — SIGNIFICANT CHANGE UP (ref 1.7–9.3)
NEUTROPHILS # BLD AUTO: 6.34 K/UL — SIGNIFICANT CHANGE UP (ref 1.4–6.5)
NEUTROPHILS NFR BLD AUTO: 59.3 % — SIGNIFICANT CHANGE UP (ref 42.2–75.2)
NRBC # BLD: 0 /100 WBCS — SIGNIFICANT CHANGE UP (ref 0–0)
PLATELET # BLD AUTO: 233 K/UL — SIGNIFICANT CHANGE UP (ref 130–400)
POTASSIUM SERPL-MCNC: 4.4 MMOL/L — SIGNIFICANT CHANGE UP (ref 3.5–5)
POTASSIUM SERPL-SCNC: 4.4 MMOL/L — SIGNIFICANT CHANGE UP (ref 3.5–5)
PROT SERPL-MCNC: 6.2 G/DL — SIGNIFICANT CHANGE UP (ref 6–8)
RBC # BLD: 5.14 M/UL — SIGNIFICANT CHANGE UP (ref 4.7–6.1)
RBC # FLD: 14.1 % — SIGNIFICANT CHANGE UP (ref 11.5–14.5)
SODIUM SERPL-SCNC: 143 MMOL/L — SIGNIFICANT CHANGE UP (ref 135–146)
WBC # BLD: 10.68 K/UL — SIGNIFICANT CHANGE UP (ref 4.8–10.8)
WBC # FLD AUTO: 10.68 K/UL — SIGNIFICANT CHANGE UP (ref 4.8–10.8)

## 2021-07-28 PROCEDURE — 99239 HOSP IP/OBS DSCHRG MGMT >30: CPT

## 2021-07-28 PROCEDURE — 99232 SBSQ HOSP IP/OBS MODERATE 35: CPT

## 2021-07-28 RX ORDER — FUROSEMIDE 40 MG
1 TABLET ORAL
Qty: 0 | Refills: 0 | DISCHARGE

## 2021-07-28 RX ORDER — FUROSEMIDE 40 MG
1 TABLET ORAL
Qty: 60 | Refills: 0
Start: 2021-07-28 | End: 2021-08-26

## 2021-07-28 RX ORDER — FUROSEMIDE 40 MG
40 TABLET ORAL
Refills: 0 | Status: DISCONTINUED | OUTPATIENT
Start: 2021-07-28 | End: 2021-07-28

## 2021-07-28 RX ORDER — FUROSEMIDE 40 MG
1 TABLET ORAL
Qty: 0 | Refills: 0 | DISCHARGE
Start: 2021-07-28 | End: 2021-08-26

## 2021-07-28 RX ADMIN — FAMOTIDINE 40 MILLIGRAM(S): 10 INJECTION INTRAVENOUS at 11:32

## 2021-07-28 RX ADMIN — Medication 1 MILLIGRAM(S): at 11:32

## 2021-07-28 RX ADMIN — APIXABAN 2.5 MILLIGRAM(S): 2.5 TABLET, FILM COATED ORAL at 05:47

## 2021-07-28 RX ADMIN — Medication 40 MILLIGRAM(S): at 17:35

## 2021-07-28 RX ADMIN — SACUBITRIL AND VALSARTAN 1 TABLET(S): 24; 26 TABLET, FILM COATED ORAL at 05:47

## 2021-07-28 RX ADMIN — Medication 200 MICROGRAM(S): at 05:47

## 2021-07-28 RX ADMIN — Medication 100 MILLIGRAM(S): at 11:32

## 2021-07-28 RX ADMIN — Medication 50 MILLIGRAM(S): at 17:35

## 2021-07-28 RX ADMIN — Medication 25 MILLIGRAM(S): at 05:47

## 2021-07-28 RX ADMIN — RANOLAZINE 500 MILLIGRAM(S): 500 TABLET, FILM COATED, EXTENDED RELEASE ORAL at 05:47

## 2021-07-28 RX ADMIN — ISOSORBIDE MONONITRATE 30 MILLIGRAM(S): 60 TABLET, EXTENDED RELEASE ORAL at 11:32

## 2021-07-28 RX ADMIN — Medication 81 MILLIGRAM(S): at 11:32

## 2021-07-28 RX ADMIN — Medication 6 UNIT(S): at 07:57

## 2021-07-28 RX ADMIN — RANOLAZINE 500 MILLIGRAM(S): 500 TABLET, FILM COATED, EXTENDED RELEASE ORAL at 17:35

## 2021-07-28 RX ADMIN — SACUBITRIL AND VALSARTAN 1 TABLET(S): 24; 26 TABLET, FILM COATED ORAL at 17:35

## 2021-07-28 RX ADMIN — Medication 40 MILLIGRAM(S): at 05:47

## 2021-07-28 NOTE — DISCHARGE NOTE PROVIDER - NSDCCPCAREPLAN_GEN_ALL_CORE_FT
PRINCIPAL DISCHARGE DIAGNOSIS  Diagnosis: CHF (congestive heart failure)  Assessment and Plan of Treatment: You presented to the ED for shortness of breath and chest pain. You were admitted for Acute on chronic CHF.   Heart failure (HF) is a condition that does not allow your heart to fill or pump properly. Not enough oxygen in your blood gets to your organs and tissues. HF can occur in the right side, the left side, or both lower chambers of your heart. HF is often caused by damage or injury to your heart. The damage may be caused by heart attack, other heart conditions, or high blood pressure. HF is a long-term condition that tends to get worse over time. It is important to manage your health to improve your quality of life. HF can be worsened by heavy alcohol use, smoking, diabetes that is not controlled, or obesity.  You were seen by Cardiologist. You had Echo done on 7/27/21- EF 25-30%, G II DD, mild tricuspid regurgitation. You were treated withIV lasix, which were switched to PO lasix. You were seen by EP for interrogation of ICD.   Pelase continue to take medications as prescribed. Please follow up with Dr. Gretchen greenwood in 1-2 weeks.

## 2021-07-28 NOTE — PROGRESS NOTE ADULT - ASSESSMENT
76 year old M patient with past medical history of HFrEF last echo showed and EF of 26% according to the wife s/p ICD paroxysmal Afib on Eliquis CAD s/p CABG in 2016, Bullous pemphigoid s/p one dose of IVIG, Hypertension, dyslipidemia Present to the ED for shortness of breath and chest pain.    # Acute on chronic HFrEF 26% s/p ICD  - JVD , crackles, and LE edema on exam  - CXR bilateral opacities  - pro BNP 5000  - f/u repeat echo  - Cw lasix 40 Iv bid  - Cw metoprolol 50-25 mg AM/PM  - Cw Entresto 24/26 q 12    # CAD s/p CABG 2016  # Chest pain w/ exertion possibly stable angina  - trop 0.02 x3; - EKG T wave inversion paced rhythm no changes from previous EKG  - cw ASA 81 mg daily  - cw atorvastatin 40 mg daily  - Cw Ranexa 500 q 12  - Cw Imdur 30 mg daily    # Paroxysmal Afib ([id number] score 6)  - Cw eliquis 2.5 daily  - EP for device interrogation    # Hypertension  c/w Lasix, Lopressor    # CKD III  - progression of CKD more likely than OVI  - Creatinine baseline 1.7 in 2020  - avoid nephro toxins  - monitor BMP  - renally dosed meds  - Phosphorus wnl    # DM II  - A1c 5.9  - on Lantus 30 U Januvia and glipizide at home  - Start Lantus 20 U at bedtime  - Lispro 6 q 8 + corrective scale  - Avoid hypoglycemia  - monitor FS ; keep -180  - consistent carb    # Dyslipidemia  - FU lipid panel  - cw atorvastatin 40 mg daily  - Cw fenofibrate 160 mg daily  - DASH TLC diet    # BPH  - cw tamsulosin 0.4 daily    # Hypothyroidism   - Cw Levothyroxine 200 mcg daily  - FU TSH      # Suspected Folate deficiency  - cw folic acid daily    # Morbid Obesity  - counseled about losing weight and healthy diet  - DASH TLC diet    # DVT ppx Eliquis  # Diet DASH TLC CC fluid restrict  # GERD famotidine  # Full code 
Switch lasix to PO  C/w ASA.  Keep same dose Eliquis - patient will re-assess with the PMD and Dr. Godinez as outpatient  C/w BB, Entresto, Imdur, ranexa.    D/c telemetry.  may d/c home today from cardiac perspective  F/u with Dr. Godinez in 2 weeks.

## 2021-07-28 NOTE — DISCHARGE NOTE PROVIDER - CARE PROVIDER_API CALL
Geoff Godinez  CARDIOLOGY  [address] [place] [id number]  Phone: [phone]  Fax: [phone]  Follow Up Time: 1 week

## 2021-07-28 NOTE — DISCHARGE NOTE PROVIDER - HOSPITAL COURSE
76 year old M patient with past medical history of HFrEF last echo showed and EF of 26% according to the wife s/p ICD paroxysmal Afib on Eliquis CAD s/p CABG in 2016, Bullous pemphigoid s/p one dose of IVIG, Hypertension, dyslipidemia Present to the ED for shortness of breath and chest pain. Pt was admitted for Acute on chronic HFrEF 26% s/p ICD and  Chest pain w/ exertion possibly due to stable angina. CXR showed bilateral opacities, pro BNP 5000. Patient's troponin 0.02 x3; -EKG T wave inversion paced rhythm no changes from previous EKG. Repeat echo 7/27/21- EF 25-30%, G II DD, mild tricuspid regurgitation. Patient was seen by Cardiology, received IV lasix, which were switched to PO. Pt was seen by EP, interrogation of BiV ICD was done-  PMT event.   Pt is stable for DC with OP follow up with Cardiologist Dr. Godinez for ischemic work up.

## 2021-07-28 NOTE — PROGRESS NOTE ADULT - SUBJECTIVE AND OBJECTIVE BOX
Patient Information:  ALYSSA TILLEY / 76y / Male / MRN#:[id number]    Hospital Day: 1d    Interval History:      Past Medical History:  Coronary artery disease with other form of angina pectoris    Type 2 diabetes mellitus with other neurologic complication, with long-term current use of insulin    Hypertension, unspecified type    High cholesterol    Hypothyroidism, unspecified type    Other congestive heart failure    HFrEF (heart failure with reduced ejection fraction)      Past Surgical History:  Artificial cardiac pacemaker    S/P CABG x 6    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      Allergies:  contrast media (iodine-based) (Other)    Medications:  PRN:    Standing:  apixaban 2.5 milliGRAM(s) Oral <User Schedule>  aspirin enteric coated 81 milliGRAM(s) Oral daily  atorvastatin 40 milliGRAM(s) Oral at bedtime  chlorhexidine 4% Liquid 1 Application(s) Topical daily  dextrose 40% Gel 15 Gram(s) Oral once  dextrose 5%. 1000 milliLiter(s) (50 mL/Hr) IV Continuous <Continuous>  dextrose 5%. 1000 milliLiter(s) (100 mL/Hr) IV Continuous <Continuous>  dextrose 50% Injectable 25 Gram(s) IV Push once  dextrose 50% Injectable 12.5 Gram(s) IV Push once  dextrose 50% Injectable 25 Gram(s) IV Push once  famotidine    Tablet 40 milliGRAM(s) Oral daily  fenofibrate Tablet 145 milliGRAM(s) Oral at bedtime  folic acid 1 milliGRAM(s) Oral daily  furosemide   Injectable 40 milliGRAM(s) IV Push every 12 hours  glucagon  Injectable 1 milliGRAM(s) IntraMuscular once  insulin glargine Injectable (LANTUS) 20 Unit(s) SubCutaneous at bedtime  insulin lispro (ADMELOG) corrective regimen sliding scale   SubCutaneous three times a day before meals  insulin lispro Injectable (ADMELOG) 6 Unit(s) SubCutaneous three times a day before meals  isosorbide   mononitrate ER Tablet (IMDUR) 30 milliGRAM(s) Oral daily  levothyroxine 200 MICROGram(s) Oral daily  metoprolol tartrate 50 milliGRAM(s) Oral every 24 hours  metoprolol tartrate 25 milliGRAM(s) Oral daily  ranolazine 500 milliGRAM(s) Oral two times a day  sacubitril 24 mG/valsartan 26 mG 1 Tablet(s) Oral two times a day  tamsulosin 0.4 milliGRAM(s) Oral at bedtime  thiamine 100 milliGRAM(s) Oral daily    Home:  aspirin 81 mg oral tablet, chewable: 1 tab(s) orally once a day  atorvastatin 40 mg oral tablet: 1 tab(s) orally once a day  Eliquis 2.5 mg oral tablet: orally once a day  Entresto 24 mg-26 mg oral tablet: 1 tab(s) orally 2 times a day  famotidine 40 mg oral tablet: 1 tab(s) orally once a day (at bedtime)  fenofibrate 160 mg oral tablet: 1 tab(s) orally once a day  Flomax 0.4 mg oral capsule: 1 cap(s) orally once a day  folic acid 1 mg oral tablet: 1 tab(s) orally once a day  furosemide 40 mg oral tablet: 1 tab(s) orally once a day  furosemide 40 mg oral tablet: 1 tab(s) orally once a day  glipiZIDE 5 mg oral tablet: 1 tab(s) orally once a day  isosorbide mononitrate 30 mg oral tablet, extended release: 1 tab(s) orally once a day  Januvia 50 mg oral tablet: 1 tab(s) orally once a day  Lantus: 30 unit(s) subcutaneous once a day (at bedtime)  levothyroxine 200 mcg (0.2 mg) oral tablet: 1 tab(s) orally once a day  metoprolol tartrate 25 mg oral tablet: orally once a day  metoprolol tartrate 50 mg oral tablet: orally once a day (at bedtime)  Ranexa 500 mg oral tablet, extended release: 1 tab(s) orally 2 times a day    Vitals:  T(C): 36.7, Max: 36.7 (07-27-21 @ 02:09)  T(F): 98, Max: 98 (07-27-21 @ 02:09)  HR: 77 (66 - 83)  BP: 135/75 (122/71 - 142/74)  RR: 20 (16 - 20)  SpO2: 99% (95% - 100%)    Physical Exam:  General: NAD  HEENT: NC AT  Heart: irregular, not tachycardic   Lungs: decreased breath sounds bases  Abdomen: soft nontender  Extremities: 1+ pitting edema b/l LE  Neuro: nfd    Labs:  CBC (07-27 @ 06:31)                        Hgb: 13.9   WBC: 9.35  )-----------------( Plts: 225                              Hct: 44.5     Chem (07-27 @ 06:31)  Na: 142  |     Cl: 106     |  BUN: 36  -----------------------------------------< Gluc: 125    K: 4.1   |    HCO3: 26  |  Cr: 1.9    Ca 9.2 (07-27 @ 06:31)  Phos 3.7 (07-27 @ 06:31)  Mg 2.2 (07-27 @ 06:31)    LFTs (07-27 @ 06:31)  TPro 6.0  /  Alb 3.8  TBili 0.5  /  DBili     AST 17  /  ALT 12  /  AlkPhos 42    Cardiac Markers (07-27 @ 06:31)  Troponin I X  Troponin T 0.02  CK X  CKMB X  CKMB Units X  Myoglobin X  Lactate X  ESR X    Cardiac Markers (07-27 @ 00:49)  Troponin I X  Troponin T 0.02  CK X  CKMB X  CKMB Units X  Myoglobin X  Lactate X  ESR X    Cardiac Markers (07-26 @ 19:19)  Troponin I X  Troponin T 0.02  CK X  CKMB X  CKMB Units X  Myoglobin X  Lactate X  ESR X        PT/INR (07-26 @ 19:55)  PT: 14.90; INR: 1.30   PTT: 40.1             Microbiology:    Radiology:  
Patient is a 76y old  Male who presents with a chief complaint of chest pain (27 Jul 2021 14:03)    HPI:  This is a 76 year old M patient with past medical history of HFrEF last echo showed and EF of 26% according to the wife s/p ICD paroxysmal Afib on eliquis, CAD s/p CABG in 2016, Bullous pemphigoid s/p one dose of IVIG, Hypertension, dyslipidemia Present to the ED for shortness of breath and chest pain.  History goes back to 3 days prior to presentation when patient started to feel chest tightness and shortness of breath that was worsening over time  He reports intermittent chest pain, oppressive , non radiating that he rate 10/10. Pain improves with tylenol and worsens with breathing  He reported worsening LE swelling and one episode of vomiting  denies cough, sputum, fever chills or diarrhea    In ED vitals WNL (26 Jul 2021 22:00)      SUBJ:  Patient seen and examined. Clinically feels better. AICD interrogation noted. Diuresed well with Lasix. Ambulating well.      MEDICATIONS  (STANDING):  apixaban 2.5 milliGRAM(s) Oral <User Schedule>  aspirin enteric coated 81 milliGRAM(s) Oral daily  atorvastatin 40 milliGRAM(s) Oral at bedtime  chlorhexidine 4% Liquid 1 Application(s) Topical daily  dextrose 40% Gel 15 Gram(s) Oral once  dextrose 5%. 1000 milliLiter(s) (50 mL/Hr) IV Continuous <Continuous>  dextrose 5%. 1000 milliLiter(s) (100 mL/Hr) IV Continuous <Continuous>  dextrose 50% Injectable 25 Gram(s) IV Push once  dextrose 50% Injectable 12.5 Gram(s) IV Push once  dextrose 50% Injectable 25 Gram(s) IV Push once  famotidine    Tablet 40 milliGRAM(s) Oral daily  fenofibrate Tablet 145 milliGRAM(s) Oral at bedtime  folic acid 1 milliGRAM(s) Oral daily  furosemide   Injectable 40 milliGRAM(s) IV Push every 12 hours  glucagon  Injectable 1 milliGRAM(s) IntraMuscular once  insulin glargine Injectable (LANTUS) 20 Unit(s) SubCutaneous at bedtime  insulin lispro (ADMELOG) corrective regimen sliding scale   SubCutaneous three times a day before meals  insulin lispro Injectable (ADMELOG) 6 Unit(s) SubCutaneous three times a day before meals  isosorbide   mononitrate ER Tablet (IMDUR) 30 milliGRAM(s) Oral daily  levothyroxine 200 MICROGram(s) Oral daily  metoprolol tartrate 50 milliGRAM(s) Oral every 24 hours  metoprolol tartrate 25 milliGRAM(s) Oral daily  ranolazine 500 milliGRAM(s) Oral two times a day  sacubitril 24 mG/valsartan 26 mG 1 Tablet(s) Oral two times a day  tamsulosin 0.4 milliGRAM(s) Oral at bedtime  thiamine 100 milliGRAM(s) Oral daily    MEDICATIONS  (PRN):            Vital Signs Last 24 Hrs  T(C): 35.8 (28 Jul 2021 05:12), Max: 36.4 (27 Jul 2021 15:03)  T(F): 96.4 (28 Jul 2021 05:12), Max: 97.6 (27 Jul 2021 20:00)  HR: 79 (28 Jul 2021 05:12) (63 - 79)  BP: 138/72 (28 Jul 2021 05:12) (138/72 - 165/77)  BP(mean): --  RR: 18 (28 Jul 2021 05:12) (17 - 18)  SpO2: 99% (27 Jul 2021 13:24) (99% - 99%)      PHYSICAL EXAM:    GEN: AAO x 3, NAD  HEENT: NC/AT, PERRL  Neck: No JVD, no bruits  CV: Reg, S1-S2, no murmur  Lungs: CTAB  Abd: Soft, non-tender  Ext: No edema        07-27-21 @ 07:01  -  07-28-21 @ 07:00  --------------------------------------------------------  IN: 360 mL / OUT: 880 mL / NET: -520 mL    07-28-21 @ 07:01  -  07-28-21 @ 09:52  --------------------------------------------------------  IN: 180 mL / OUT: 0 mL / NET: 180 mL        I&O's Summary    27 Jul 2021 07:01  -  28 Jul 2021 07:00  --------------------------------------------------------  IN: 360 mL / OUT: 880 mL / NET: -520 mL    28 Jul 2021 07:01  -  28 Jul 2021 09:52  --------------------------------------------------------  IN: 180 mL / OUT: 0 mL / NET: 180 mL    	      LABS:                        14.4   10.68 )-----------( 233      ( 28 Jul 2021 05:49 )             45.5     07-28    143  |  105  |  41<H>  ----------------------------<  130<H>  4.4   |  28  |  2.1<H>    Ca    9.4      28 Jul 2021 05:49  Phos  3.7     07-27  Mg     2.3     07-28    TPro  6.2  /  Alb  3.8  /  TBili  0.5  /  DBili  x   /  AST  19  /  ALT  12  /  AlkPhos  46  07-28    CARDIAC MARKERS ( 27 Jul 2021 06:31 )  x     / 0.02 ng/mL / x     / x     / x      CARDIAC MARKERS ( 27 Jul 2021 00:49 )  x     / 0.02 ng/mL / x     / x     / x      CARDIAC MARKERS ( 26 Jul 2021 19:19 )  x     / 0.02 ng/mL / x     / x     / x          PT/INR - ( 26 Jul 2021 19:55 )   PT: 14.90 sec;   INR: 1.30 ratio         PTT - ( 26 Jul 2021 19:55 )  PTT:40.1 sec      BNP  RADIOLOGY & ADDITIONAL STUDIES:      IMPRESSION AND PLAN:

## 2021-07-28 NOTE — DISCHARGE NOTE PROVIDER - NSDCMRMEDTOKEN_GEN_ALL_CORE_FT
aspirin 81 mg oral tablet, chewable: 1 tab(s) orally once a day  atorvastatin 40 mg oral tablet: 1 tab(s) orally once a day  Eliquis 2.5 mg oral tablet: orally once a day  Entresto 24 mg-26 mg oral tablet: 1 tab(s) orally 2 times a day  famotidine 40 mg oral tablet: 1 tab(s) orally once a day (at bedtime)  fenofibrate 160 mg oral tablet: 1 tab(s) orally once a day  Flomax 0.4 mg oral capsule: 1 cap(s) orally once a day  folic acid 1 mg oral tablet: 1 tab(s) orally once a day  furosemide 40 mg oral tablet: 1 tab(s) orally 2 times a day  glipiZIDE 5 mg oral tablet: 1 tab(s) orally once a day  isosorbide mononitrate 30 mg oral tablet, extended release: 1 tab(s) orally once a day  Januvia 50 mg oral tablet: 1 tab(s) orally once a day  Lantus: 30 unit(s) subcutaneous once a day (at bedtime)  levothyroxine 200 mcg (0.2 mg) oral tablet: 1 tab(s) orally once a day  metoprolol tartrate 25 mg oral tablet: orally once a day  metoprolol tartrate 50 mg oral tablet: orally once a day (at bedtime)  Ranexa 500 mg oral tablet, extended release: 1 tab(s) orally 2 times a day

## 2021-07-28 NOTE — DISCHARGE NOTE NURSING/CASE MANAGEMENT/SOCIAL WORK - PATIENT PORTAL LINK FT
You can access the FollowMyHealth Patient Portal offered by [facility] by registering at the following website: [email or web address]. By joining [email or web address]’s FollowMyHealth portal, you will also be able to view your health information using other applications (apps) compatible with our system.

## 2021-08-03 DIAGNOSIS — I48.0 PAROXYSMAL ATRIAL FIBRILLATION: ICD-10-CM

## 2021-08-03 DIAGNOSIS — Z79.4 LONG TERM (CURRENT) USE OF INSULIN: ICD-10-CM

## 2021-08-03 DIAGNOSIS — I13.0 HYPERTENSIVE HEART AND CHRONIC KIDNEY DISEASE WITH HEART FAILURE AND STAGE 1 THROUGH STAGE 4 CHRONIC KIDNEY DISEASE, OR UNSPECIFIED CHRONIC KIDNEY DISEASE: ICD-10-CM

## 2021-08-03 DIAGNOSIS — I25.118 ATHEROSCLEROTIC HEART DISEASE OF NATIVE CORONARY ARTERY WITH OTHER FORMS OF ANGINA PECTORIS: ICD-10-CM

## 2021-08-03 DIAGNOSIS — I25.5 ISCHEMIC CARDIOMYOPATHY: ICD-10-CM

## 2021-08-03 DIAGNOSIS — E66.01 MORBID (SEVERE) OBESITY DUE TO EXCESS CALORIES: ICD-10-CM

## 2021-08-03 DIAGNOSIS — Z79.82 LONG TERM (CURRENT) USE OF ASPIRIN: ICD-10-CM

## 2021-08-03 DIAGNOSIS — R07.89 OTHER CHEST PAIN: ICD-10-CM

## 2021-08-03 DIAGNOSIS — I36.1 NONRHEUMATIC TRICUSPID (VALVE) INSUFFICIENCY: ICD-10-CM

## 2021-08-03 DIAGNOSIS — K21.9 GASTRO-ESOPHAGEAL REFLUX DISEASE WITHOUT ESOPHAGITIS: ICD-10-CM

## 2021-08-03 DIAGNOSIS — E78.5 HYPERLIPIDEMIA, UNSPECIFIED: ICD-10-CM

## 2021-08-03 DIAGNOSIS — E11.40 TYPE 2 DIABETES MELLITUS WITH DIABETIC NEUROPATHY, UNSPECIFIED: ICD-10-CM

## 2021-08-03 DIAGNOSIS — Z91.041 RADIOGRAPHIC DYE ALLERGY STATUS: ICD-10-CM

## 2021-08-03 DIAGNOSIS — Z79.01 LONG TERM (CURRENT) USE OF ANTICOAGULANTS: ICD-10-CM

## 2021-08-03 DIAGNOSIS — N18.32 CHRONIC KIDNEY DISEASE, STAGE 3B: ICD-10-CM

## 2021-08-03 DIAGNOSIS — Z87.891 PERSONAL HISTORY OF NICOTINE DEPENDENCE: ICD-10-CM

## 2021-08-03 DIAGNOSIS — Z95.810 PRESENCE OF AUTOMATIC (IMPLANTABLE) CARDIAC DEFIBRILLATOR: ICD-10-CM

## 2021-08-03 DIAGNOSIS — N40.0 BENIGN PROSTATIC HYPERPLASIA WITHOUT LOWER URINARY TRACT SYMPTOMS: ICD-10-CM

## 2021-08-03 DIAGNOSIS — E78.00 PURE HYPERCHOLESTEROLEMIA, UNSPECIFIED: ICD-10-CM

## 2021-08-03 DIAGNOSIS — E53.8 DEFICIENCY OF OTHER SPECIFIED B GROUP VITAMINS: ICD-10-CM

## 2021-08-03 DIAGNOSIS — E11.22 TYPE 2 DIABETES MELLITUS WITH DIABETIC CHRONIC KIDNEY DISEASE: ICD-10-CM

## 2021-08-03 DIAGNOSIS — E11.65 TYPE 2 DIABETES MELLITUS WITH HYPERGLYCEMIA: ICD-10-CM

## 2021-08-03 DIAGNOSIS — Z95.1 PRESENCE OF AORTOCORONARY BYPASS GRAFT: ICD-10-CM

## 2021-08-03 DIAGNOSIS — R26.89 OTHER ABNORMALITIES OF GAIT AND MOBILITY: ICD-10-CM

## 2021-08-03 DIAGNOSIS — L12.0 BULLOUS PEMPHIGOID: ICD-10-CM

## 2021-08-03 DIAGNOSIS — Z79.890 HORMONE REPLACEMENT THERAPY: ICD-10-CM

## 2021-08-03 DIAGNOSIS — N17.9 ACUTE KIDNEY FAILURE, UNSPECIFIED: ICD-10-CM

## 2021-08-03 DIAGNOSIS — I50.23 ACUTE ON CHRONIC SYSTOLIC (CONGESTIVE) HEART FAILURE: ICD-10-CM

## 2021-08-13 ENCOUNTER — APPOINTMENT (OUTPATIENT)
Dept: CARDIOLOGY | Facility: CLINIC | Age: 76
End: 2021-08-13
Payer: MEDICARE

## 2021-08-13 ENCOUNTER — NON-APPOINTMENT (OUTPATIENT)
Age: 76
End: 2021-08-13

## 2021-08-13 PROCEDURE — 93296 REM INTERROG EVL PM/IDS: CPT

## 2021-08-13 PROCEDURE — 93295 DEV INTERROG REMOTE 1/2/MLT: CPT

## 2021-09-16 PROBLEM — I50.20 UNSPECIFIED SYSTOLIC (CONGESTIVE) HEART FAILURE: Chronic | Status: ACTIVE | Noted: 2021-07-26

## 2021-10-19 NOTE — ED ADULT NURSE NOTE - NS ED NURSE RECORD ANOTHER HT AND WT
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    Anesthesia Type:   MAC     Note:    Oropharynx: oropharynx clear of all foreign objects  Level of Consciousness: awake  Oxygen Supplementation: nasal cannula    Independent Airway: airway patency satisfactory and stable  Dentition: dentition unchanged  Vital Signs Stable: post-procedure vital signs reviewed and stable  Report to RN Given: handoff report given  Patient transferred to: PACU    Handoff Report: Identifed the Patient, Identified the Reponsible Provider, Reviewed the pertinent medical history, Discussed the surgical course, Reviewed Intra-OP anesthesia mangement and issues during anesthesia, Set expectations for post-procedure period and Allowed opportunity for questions and acknowledgement of understanding      Vitals:  Vitals Value Taken Time   /63    Temp     Pulse 64    Resp 17    SpO2 100        Electronically Signed By: LILIANA Mercado CRNA  October 19, 2021  11:50 AM   Yes

## 2021-10-26 ENCOUNTER — OUTPATIENT (OUTPATIENT)
Dept: OUTPATIENT SERVICES | Facility: HOSPITAL | Age: 76
LOS: 1 days | Discharge: HOME | End: 2021-10-26
Payer: MEDICARE

## 2021-10-26 DIAGNOSIS — J18.9 PNEUMONIA, UNSPECIFIED ORGANISM: ICD-10-CM

## 2021-10-26 DIAGNOSIS — Z95.1 PRESENCE OF AORTOCORONARY BYPASS GRAFT: Chronic | ICD-10-CM

## 2021-10-26 DIAGNOSIS — Z95.810 PRESENCE OF AUTOMATIC (IMPLANTABLE) CARDIAC DEFIBRILLATOR: Chronic | ICD-10-CM

## 2021-10-26 DIAGNOSIS — Z95.0 PRESENCE OF CARDIAC PACEMAKER: Chronic | ICD-10-CM

## 2021-10-26 PROCEDURE — 71046 X-RAY EXAM CHEST 2 VIEWS: CPT | Mod: 26

## 2021-11-12 ENCOUNTER — NON-APPOINTMENT (OUTPATIENT)
Age: 76
End: 2021-11-12

## 2021-11-12 ENCOUNTER — APPOINTMENT (OUTPATIENT)
Dept: CARDIOLOGY | Facility: CLINIC | Age: 76
End: 2021-11-12
Payer: MEDICARE

## 2021-11-12 PROCEDURE — 93295 DEV INTERROG REMOTE 1/2/MLT: CPT | Mod: NC

## 2021-11-12 PROCEDURE — 93296 REM INTERROG EVL PM/IDS: CPT

## 2022-02-11 ENCOUNTER — APPOINTMENT (OUTPATIENT)
Dept: CARDIOLOGY | Facility: CLINIC | Age: 77
End: 2022-02-11
Payer: MEDICARE

## 2022-02-11 ENCOUNTER — NON-APPOINTMENT (OUTPATIENT)
Age: 77
End: 2022-02-11

## 2022-02-11 PROCEDURE — 93296 REM INTERROG EVL PM/IDS: CPT

## 2022-02-11 PROCEDURE — 93295 DEV INTERROG REMOTE 1/2/MLT: CPT

## 2022-02-14 NOTE — CONSULT NOTE ADULT - CONSULT REASON
"Next 5 appointments (look out 90 days)      Mar 03, 2022 11:00 AM  (Arrive by 10:40 AM)  Adult Preventative Visit with Katy Moore MD  [facility] ([facility] ) [address] [facility] [address] [phone]          Requested Prescriptions   Pending Prescriptions Disp Refills    PARoxetine (PAXIL-CR) 25 MG 24 hr tablet [Pharmacy Med Name: PAROXETINE ER 25MG TABLETS] 180 tablet 0     Sig: TAKE 2 TABLETS BY MOUTH EVERY MORNING        SSRIs Protocol Failed - 2/14/2022  4:08 AM        Failed - PHQ-9 score less than 5 in past 6 months     Please review last PHQ-9 score.           Passed - Medication is active on med list        Passed - Patient is age 18 or older        Passed - No active pregnancy on record        Passed - No positive pregnancy test in last 12 months        Passed - Recent (6 mo) or future (30 days) visit within the authorizing provider's specialty     Patient had office visit in the last 6 months or has a visit in the next 30 days with authorizing provider or within the authorizing provider's specialty.  See \"Patient Info\" tab in inbasket, or \"Choose Columns\" in Meds & Orders section of the refill encounter.                  " CHF

## 2022-03-15 ENCOUNTER — OUTPATIENT (OUTPATIENT)
Dept: OUTPATIENT SERVICES | Facility: HOSPITAL | Age: 77
LOS: 1 days | Discharge: HOME | End: 2022-03-15
Payer: MEDICARE

## 2022-03-15 DIAGNOSIS — Z95.810 PRESENCE OF AUTOMATIC (IMPLANTABLE) CARDIAC DEFIBRILLATOR: Chronic | ICD-10-CM

## 2022-03-15 DIAGNOSIS — R07.9 CHEST PAIN, UNSPECIFIED: ICD-10-CM

## 2022-03-15 DIAGNOSIS — Z95.0 PRESENCE OF CARDIAC PACEMAKER: Chronic | ICD-10-CM

## 2022-03-15 DIAGNOSIS — Z95.1 PRESENCE OF AORTOCORONARY BYPASS GRAFT: Chronic | ICD-10-CM

## 2022-03-15 PROCEDURE — 71046 X-RAY EXAM CHEST 2 VIEWS: CPT | Mod: 26

## 2022-05-18 ENCOUNTER — APPOINTMENT (OUTPATIENT)
Dept: CARDIOLOGY | Facility: CLINIC | Age: 77
End: 2022-05-18
Payer: MEDICARE

## 2022-05-18 ENCOUNTER — NON-APPOINTMENT (OUTPATIENT)
Age: 77
End: 2022-05-18

## 2022-05-18 PROCEDURE — 93296 REM INTERROG EVL PM/IDS: CPT

## 2022-05-18 PROCEDURE — 93295 DEV INTERROG REMOTE 1/2/MLT: CPT

## 2022-06-22 NOTE — CONSULT NOTE ADULT - CONSULT REASON
Hide Include Location In Plan Question?: No Detail Level: Generalized HfrEF Include Location In Plan?: Yes

## 2022-08-22 ENCOUNTER — APPOINTMENT (OUTPATIENT)
Dept: CARDIOLOGY | Facility: CLINIC | Age: 77
End: 2022-08-22

## 2022-08-22 ENCOUNTER — NON-APPOINTMENT (OUTPATIENT)
Age: 77
End: 2022-08-22

## 2022-08-22 PROCEDURE — 93295 DEV INTERROG REMOTE 1/2/MLT: CPT

## 2022-08-22 PROCEDURE — 93296 REM INTERROG EVL PM/IDS: CPT

## 2022-09-02 ENCOUNTER — APPOINTMENT (OUTPATIENT)
Dept: CARDIOLOGY | Facility: CLINIC | Age: 77
End: 2022-09-02

## 2022-10-28 ENCOUNTER — APPOINTMENT (OUTPATIENT)
Dept: CARDIOLOGY | Facility: CLINIC | Age: 77
End: 2022-10-28

## 2022-10-28 VITALS
BODY MASS INDEX: 30.92 KG/M2 | DIASTOLIC BLOOD PRESSURE: 72 MMHG | WEIGHT: 197 LBS | TEMPERATURE: 97.3 F | SYSTOLIC BLOOD PRESSURE: 122 MMHG | HEIGHT: 67 IN

## 2022-10-28 DIAGNOSIS — I48.0 PAROXYSMAL ATRIAL FIBRILLATION: ICD-10-CM

## 2022-10-28 PROCEDURE — 93284 PRGRMG EVAL IMPLANTABLE DFB: CPT

## 2022-10-28 PROCEDURE — 93290 INTERROG DEV EVAL ICPMS IP: CPT

## 2022-10-28 PROCEDURE — 99214 OFFICE O/P EST MOD 30 MIN: CPT

## 2022-10-28 RX ORDER — PNV NO.95/FERROUS FUM/FOLIC AC 28MG-0.8MG
TABLET ORAL
Refills: 0 | Status: COMPLETED | COMMUNITY
End: 2022-10-28

## 2022-10-28 RX ORDER — SACUBITRIL AND VALSARTAN 49; 51 MG/1; MG/1
49-51 TABLET, FILM COATED ORAL TWICE DAILY
Qty: 180 | Refills: 3 | Status: COMPLETED | COMMUNITY
End: 2022-10-28

## 2022-10-28 NOTE — PHYSICAL EXAM
[Heart Sounds] : normal S1 and S2 [] : no respiratory distress [Respiration, Rhythm And Depth] : normal respiratory rhythm and effort [Left Infraclavicular] : left infraclavicular area [Clean] : clean [Dry] : dry [Well-Healed] : well-healed [Bowel Sounds] : normal bowel sounds [Abdomen Soft] : soft [Nail Clubbing] : no clubbing of the fingernails [Cyanosis, Localized] : no localized cyanosis [General Appearance - Well Developed] : well developed [Normal Appearance] : normal appearance [Well Groomed] : well groomed [General Appearance - Well Nourished] : well nourished [No Deformities] : no deformities [General Appearance - In No Acute Distress] : no acute distress [Heart Rate And Rhythm] : heart rate and rhythm were normal [Murmurs] : no murmurs present [Exaggerated Use Of Accessory Muscles For Inspiration] : no accessory muscle use [Auscultation Breath Sounds / Voice Sounds] : lungs were clear to auscultation bilaterally

## 2022-11-16 NOTE — PROCEDURE
[No] : not [NSR] : normal sinus rhythm [See Scanned Paceart Report] : See scanned paceart report [See Device Printout] : See device printout [CRT-D] : Cardiac resynchronization therapy defibrillator [DDD] : DDD [Impedance: ___ Ohms] : current cell impedance is [unfilled] Ohms [Charge Time: ___ sec] : charge time was [unfilled] seconds [Longevity: ___ months] : The estimated remaining battery life is [unfilled] months [Normal] : The battery status is normal. [Threshold Testing Performed] : Threshold testing was performed [Atrial] : Atrial [Ventricular] : Ventricular [Sensing Amplitude ___mv] : sensing amplitude was [unfilled] mv [Lead Imp:  ___ohms] : lead impedance was [unfilled] ohms [___V @] : [unfilled] V [___ ms] : [unfilled] ms [Asense-Vsense ___ %] : Asense-Vsense [unfilled]% [Programmed for Longevity] : output reprogrammed for improved battery longevity [de-identified] : ABBOTT / FAUSTINO [de-identified] : [id number] [de-identified] : [id number] [de-identified] : 6/2/2016 [de-identified] : 60 [de-identified] : AP: 1.6%\par : 98%\par No New Episodes\par Corevue is stable

## 2022-11-16 NOTE — ASSESSMENT
[FreeTextEntry1] : Chronic systolic heart failure - normal BiV ICD function\par - Continue Entresto 49-51mg BID. \par - Continue Metoprolol 25mg at night, 50mg during the day.\par \par Atrial fibrillation\par - Patient is doing great.\par - Patient has 0 episodes of atrial fibrillation.\par - Continue Eliquis 2.5mg Q12. No bleeding. \par \par ·	I have spent 35 minutes of time on the encounter excluding separately reported services. \par \par I, Petar Camara, personally performed the services described in this documentation. All medical record entries made by the scribe/nurse CTA were at my direction and in my presence. I have reviewed the chart and agree that the record reflects my personal performance and is accurate and complete.\par \par

## 2022-11-16 NOTE — ADDENDUM
[FreeTextEntry1] : IManas assisted in documentation on 10/28/2022 acting as a scribe for Dr. Petar Camara.

## 2022-11-16 NOTE — HISTORY OF PRESENT ILLNESS
[de-identified] : \par Cardiologist: Dr. Godinez \par \par 76 y/o male with a BiV AICD for ICM returning for routine device interrogation. \par \par Denies chest pain, shortness of breath, dizziness, near syncope or syncope. \par \par Patient believed he had a shock in October, however checking the device showed no shocks since implant of the device. \par  \par

## 2023-01-19 ENCOUNTER — APPOINTMENT (OUTPATIENT)
Dept: GASTROENTEROLOGY | Facility: CLINIC | Age: 78
End: 2023-01-19

## 2023-01-27 ENCOUNTER — NON-APPOINTMENT (OUTPATIENT)
Age: 78
End: 2023-01-27

## 2023-01-27 ENCOUNTER — APPOINTMENT (OUTPATIENT)
Dept: CARDIOLOGY | Facility: CLINIC | Age: 78
End: 2023-01-27
Payer: COMMERCIAL

## 2023-01-27 PROCEDURE — 93296 REM INTERROG EVL PM/IDS: CPT

## 2023-01-27 PROCEDURE — 93295 DEV INTERROG REMOTE 1/2/MLT: CPT

## 2023-02-20 NOTE — H&P ADULT - NSICDXFAMILYHX_GEN_ALL_CORE_FT
Continue lantus 38 units daily  2. Switch metformin ER 1000 mg twice daily. If still having diarrhea, can decrease to 1000 mg every morning   3. Check glucose daily  4. Continue control carbohydrate portions   5. Follow up in 3 months    Patient/Caregiver provided printed discharge information. FAMILY HISTORY:  Mother  Still living? Unknown  Family history of sinus bradycardia, Age at diagnosis: Age Unknown

## 2023-03-07 ENCOUNTER — INPATIENT (INPATIENT)
Facility: HOSPITAL | Age: 78
LOS: 9 days | Discharge: NURSING, REHAB OR LONG-TERM CARE | DRG: 280 | End: 2023-03-17
Attending: INTERNAL MEDICINE | Admitting: INTERNAL MEDICINE
Payer: MEDICARE

## 2023-03-07 VITALS
DIASTOLIC BLOOD PRESSURE: 66 MMHG | HEART RATE: 105 BPM | OXYGEN SATURATION: 95 % | RESPIRATION RATE: 30 BRPM | SYSTOLIC BLOOD PRESSURE: 132 MMHG

## 2023-03-07 DIAGNOSIS — Z91.041 RADIOGRAPHIC DYE ALLERGY STATUS: ICD-10-CM

## 2023-03-07 DIAGNOSIS — Z79.01 LONG TERM (CURRENT) USE OF ANTICOAGULANTS: ICD-10-CM

## 2023-03-07 DIAGNOSIS — Z95.810 PRESENCE OF AUTOMATIC (IMPLANTABLE) CARDIAC DEFIBRILLATOR: ICD-10-CM

## 2023-03-07 DIAGNOSIS — I11.0 HYPERTENSIVE HEART DISEASE WITH HEART FAILURE: ICD-10-CM

## 2023-03-07 DIAGNOSIS — I49.3 VENTRICULAR PREMATURE DEPOLARIZATION: ICD-10-CM

## 2023-03-07 DIAGNOSIS — I50.9 HEART FAILURE, UNSPECIFIED: ICD-10-CM

## 2023-03-07 DIAGNOSIS — J91.8 PLEURAL EFFUSION IN OTHER CONDITIONS CLASSIFIED ELSEWHERE: ICD-10-CM

## 2023-03-07 DIAGNOSIS — I48.0 PAROXYSMAL ATRIAL FIBRILLATION: ICD-10-CM

## 2023-03-07 DIAGNOSIS — Z95.810 PRESENCE OF AUTOMATIC (IMPLANTABLE) CARDIAC DEFIBRILLATOR: Chronic | ICD-10-CM

## 2023-03-07 DIAGNOSIS — R77.8 OTHER SPECIFIED ABNORMALITIES OF PLASMA PROTEINS: ICD-10-CM

## 2023-03-07 DIAGNOSIS — Z79.84 LONG TERM (CURRENT) USE OF ORAL HYPOGLYCEMIC DRUGS: ICD-10-CM

## 2023-03-07 DIAGNOSIS — Z79.82 LONG TERM (CURRENT) USE OF ASPIRIN: ICD-10-CM

## 2023-03-07 DIAGNOSIS — I21.A1 MYOCARDIAL INFARCTION TYPE 2: ICD-10-CM

## 2023-03-07 DIAGNOSIS — E78.00 PURE HYPERCHOLESTEROLEMIA, UNSPECIFIED: ICD-10-CM

## 2023-03-07 DIAGNOSIS — I50.23 ACUTE ON CHRONIC SYSTOLIC (CONGESTIVE) HEART FAILURE: ICD-10-CM

## 2023-03-07 DIAGNOSIS — E03.9 HYPOTHYROIDISM, UNSPECIFIED: ICD-10-CM

## 2023-03-07 DIAGNOSIS — Z95.1 PRESENCE OF AORTOCORONARY BYPASS GRAFT: ICD-10-CM

## 2023-03-07 DIAGNOSIS — Z95.0 PRESENCE OF CARDIAC PACEMAKER: Chronic | ICD-10-CM

## 2023-03-07 DIAGNOSIS — E11.9 TYPE 2 DIABETES MELLITUS WITHOUT COMPLICATIONS: ICD-10-CM

## 2023-03-07 DIAGNOSIS — Z95.1 PRESENCE OF AORTOCORONARY BYPASS GRAFT: Chronic | ICD-10-CM

## 2023-03-07 DIAGNOSIS — I25.110 ATHEROSCLEROTIC HEART DISEASE OF NATIVE CORONARY ARTERY WITH UNSTABLE ANGINA PECTORIS: ICD-10-CM

## 2023-03-07 DIAGNOSIS — Z66 DO NOT RESUSCITATE: ICD-10-CM

## 2023-03-07 DIAGNOSIS — Z87.891 PERSONAL HISTORY OF NICOTINE DEPENDENCE: ICD-10-CM

## 2023-03-07 DIAGNOSIS — N17.9 ACUTE KIDNEY FAILURE, UNSPECIFIED: ICD-10-CM

## 2023-03-07 LAB
ALBUMIN SERPL ELPH-MCNC: 3 G/DL — LOW (ref 3.5–5.2)
ALP SERPL-CCNC: 103 U/L — SIGNIFICANT CHANGE UP (ref 30–115)
ALT FLD-CCNC: 21 U/L — SIGNIFICANT CHANGE UP (ref 0–41)
ANION GAP SERPL CALC-SCNC: 10 MMOL/L — SIGNIFICANT CHANGE UP (ref 7–14)
APTT BLD: 36.5 SEC — SIGNIFICANT CHANGE UP (ref 27–39.2)
AST SERPL-CCNC: 49 U/L — HIGH (ref 0–41)
BASOPHILS # BLD AUTO: 0.03 K/UL — SIGNIFICANT CHANGE UP (ref 0–0.2)
BASOPHILS NFR BLD AUTO: 0.2 % — SIGNIFICANT CHANGE UP (ref 0–1)
BILIRUB SERPL-MCNC: 0.6 MG/DL — SIGNIFICANT CHANGE UP (ref 0.2–1.2)
BUN SERPL-MCNC: 31 MG/DL — HIGH (ref 10–20)
CALCIUM SERPL-MCNC: 8.7 MG/DL — SIGNIFICANT CHANGE UP (ref 8.4–10.5)
CHLORIDE SERPL-SCNC: 103 MMOL/L — SIGNIFICANT CHANGE UP (ref 98–110)
CO2 SERPL-SCNC: 25 MMOL/L — SIGNIFICANT CHANGE UP (ref 17–32)
CREAT SERPL-MCNC: 1.3 MG/DL — SIGNIFICANT CHANGE UP (ref 0.7–1.5)
EGFR: 57 ML/MIN/1.73M2 — LOW
EOSINOPHIL # BLD AUTO: 0.07 K/UL — SIGNIFICANT CHANGE UP (ref 0–0.7)
EOSINOPHIL NFR BLD AUTO: 0.5 % — SIGNIFICANT CHANGE UP (ref 0–8)
GLUCOSE SERPL-MCNC: 166 MG/DL — HIGH (ref 70–99)
HCT VFR BLD CALC: 36.8 % — LOW (ref 42–52)
HGB BLD-MCNC: 12.1 G/DL — LOW (ref 14–18)
IMM GRANULOCYTES NFR BLD AUTO: 0.6 % — HIGH (ref 0.1–0.3)
INR BLD: 1.41 RATIO — HIGH (ref 0.65–1.3)
LACTATE BLDV-MCNC: 1 MMOL/L — SIGNIFICANT CHANGE UP (ref 0.5–2)
LACTATE SERPL-SCNC: 1 MMOL/L — SIGNIFICANT CHANGE UP (ref 0.7–2)
LYMPHOCYTES # BLD AUTO: 13.5 % — LOW (ref 20.5–51.1)
LYMPHOCYTES # BLD AUTO: 2.08 K/UL — SIGNIFICANT CHANGE UP (ref 1.2–3.4)
MCHC RBC-ENTMCNC: 27.4 PG — SIGNIFICANT CHANGE UP (ref 27–31)
MCHC RBC-ENTMCNC: 32.9 G/DL — SIGNIFICANT CHANGE UP (ref 32–37)
MCV RBC AUTO: 83.4 FL — SIGNIFICANT CHANGE UP (ref 80–94)
MONOCYTES # BLD AUTO: 1.16 K/UL — HIGH (ref 0.1–0.6)
MONOCYTES NFR BLD AUTO: 7.5 % — SIGNIFICANT CHANGE UP (ref 1.7–9.3)
NEUTROPHILS # BLD AUTO: 12.02 K/UL — HIGH (ref 1.4–6.5)
NEUTROPHILS NFR BLD AUTO: 77.7 % — HIGH (ref 42.2–75.2)
NRBC # BLD: 0 /100 WBCS — SIGNIFICANT CHANGE UP (ref 0–0)
NT-PROBNP SERPL-SCNC: HIGH PG/ML (ref 0–300)
PLATELET # BLD AUTO: 297 K/UL — SIGNIFICANT CHANGE UP (ref 130–400)
POTASSIUM SERPL-MCNC: 5 MMOL/L — SIGNIFICANT CHANGE UP (ref 3.5–5)
POTASSIUM SERPL-SCNC: 5 MMOL/L — SIGNIFICANT CHANGE UP (ref 3.5–5)
PROT SERPL-MCNC: 5.9 G/DL — LOW (ref 6–8)
PROTHROM AB SERPL-ACNC: 16.3 SEC — HIGH (ref 9.95–12.87)
RBC # BLD: 4.41 M/UL — LOW (ref 4.7–6.1)
RBC # FLD: 14.6 % — HIGH (ref 11.5–14.5)
SARS-COV-2 RNA SPEC QL NAA+PROBE: SIGNIFICANT CHANGE UP
SODIUM SERPL-SCNC: 138 MMOL/L — SIGNIFICANT CHANGE UP (ref 135–146)
TROPONIN T SERPL-MCNC: 0.02 NG/ML — HIGH
TROPONIN T SERPL-MCNC: 0.03 NG/ML — CRITICAL HIGH
WBC # BLD: 15.45 K/UL — HIGH (ref 4.8–10.8)
WBC # FLD AUTO: 15.45 K/UL — HIGH (ref 4.8–10.8)

## 2023-03-07 PROCEDURE — U0003: CPT

## 2023-03-07 PROCEDURE — 82962 GLUCOSE BLOOD TEST: CPT

## 2023-03-07 PROCEDURE — 93306 TTE W/DOPPLER COMPLETE: CPT

## 2023-03-07 PROCEDURE — 93284 PRGRMG EVAL IMPLANTABLE DFB: CPT

## 2023-03-07 PROCEDURE — 83036 HEMOGLOBIN GLYCOSYLATED A1C: CPT

## 2023-03-07 PROCEDURE — 84132 ASSAY OF SERUM POTASSIUM: CPT

## 2023-03-07 PROCEDURE — 88112 CYTOPATH CELL ENHANCE TECH: CPT

## 2023-03-07 PROCEDURE — 99291 CRITICAL CARE FIRST HOUR: CPT | Mod: 25

## 2023-03-07 PROCEDURE — 83540 ASSAY OF IRON: CPT

## 2023-03-07 PROCEDURE — 94660 CPAP INITIATION&MGMT: CPT

## 2023-03-07 PROCEDURE — 93005 ELECTROCARDIOGRAM TRACING: CPT

## 2023-03-07 PROCEDURE — 88305 TISSUE EXAM BY PATHOLOGIST: CPT

## 2023-03-07 PROCEDURE — 92610 EVALUATE SWALLOWING FUNCTION: CPT | Mod: GN

## 2023-03-07 PROCEDURE — 80048 BASIC METABOLIC PNL TOTAL CA: CPT

## 2023-03-07 PROCEDURE — 71045 X-RAY EXAM CHEST 1 VIEW: CPT | Mod: 26

## 2023-03-07 PROCEDURE — 97116 GAIT TRAINING THERAPY: CPT | Mod: GP

## 2023-03-07 PROCEDURE — 71045 X-RAY EXAM CHEST 1 VIEW: CPT

## 2023-03-07 PROCEDURE — 80053 COMPREHEN METABOLIC PANEL: CPT

## 2023-03-07 PROCEDURE — 82728 ASSAY OF FERRITIN: CPT

## 2023-03-07 PROCEDURE — 94640 AIRWAY INHALATION TREATMENT: CPT

## 2023-03-07 PROCEDURE — 87040 BLOOD CULTURE FOR BACTERIA: CPT

## 2023-03-07 PROCEDURE — 83735 ASSAY OF MAGNESIUM: CPT

## 2023-03-07 PROCEDURE — 85027 COMPLETE CBC AUTOMATED: CPT

## 2023-03-07 PROCEDURE — 82803 BLOOD GASES ANY COMBINATION: CPT

## 2023-03-07 PROCEDURE — 83615 LACTATE (LD) (LDH) ENZYME: CPT

## 2023-03-07 PROCEDURE — U0005: CPT

## 2023-03-07 PROCEDURE — 89051 BODY FLUID CELL COUNT: CPT

## 2023-03-07 PROCEDURE — 84484 ASSAY OF TROPONIN QUANT: CPT

## 2023-03-07 PROCEDURE — 87075 CULTR BACTERIA EXCEPT BLOOD: CPT

## 2023-03-07 PROCEDURE — 82042 OTHER SOURCE ALBUMIN QUAN EA: CPT

## 2023-03-07 PROCEDURE — 97162 PT EVAL MOD COMPLEX 30 MIN: CPT | Mod: GP

## 2023-03-07 PROCEDURE — 84100 ASSAY OF PHOSPHORUS: CPT

## 2023-03-07 PROCEDURE — 99291 CRITICAL CARE FIRST HOUR: CPT

## 2023-03-07 PROCEDURE — 83605 ASSAY OF LACTIC ACID: CPT

## 2023-03-07 PROCEDURE — 93010 ELECTROCARDIOGRAM REPORT: CPT

## 2023-03-07 PROCEDURE — 82945 GLUCOSE OTHER FLUID: CPT

## 2023-03-07 PROCEDURE — 85025 COMPLETE CBC W/AUTO DIFF WBC: CPT

## 2023-03-07 PROCEDURE — 85610 PROTHROMBIN TIME: CPT

## 2023-03-07 PROCEDURE — 84295 ASSAY OF SERUM SODIUM: CPT

## 2023-03-07 PROCEDURE — 83550 IRON BINDING TEST: CPT

## 2023-03-07 PROCEDURE — 83986 ASSAY PH BODY FLUID NOS: CPT

## 2023-03-07 PROCEDURE — 97530 THERAPEUTIC ACTIVITIES: CPT | Mod: GP

## 2023-03-07 PROCEDURE — 85018 HEMOGLOBIN: CPT

## 2023-03-07 PROCEDURE — 93010 ELECTROCARDIOGRAM REPORT: CPT | Mod: 77

## 2023-03-07 PROCEDURE — 82553 CREATINE MB FRACTION: CPT

## 2023-03-07 PROCEDURE — 84550 ASSAY OF BLOOD/URIC ACID: CPT

## 2023-03-07 PROCEDURE — 84466 ASSAY OF TRANSFERRIN: CPT

## 2023-03-07 PROCEDURE — 84157 ASSAY OF PROTEIN OTHER: CPT

## 2023-03-07 PROCEDURE — 87205 SMEAR GRAM STAIN: CPT

## 2023-03-07 PROCEDURE — 85014 HEMATOCRIT: CPT

## 2023-03-07 PROCEDURE — 82330 ASSAY OF CALCIUM: CPT

## 2023-03-07 PROCEDURE — 87070 CULTURE OTHR SPECIMN AEROBIC: CPT

## 2023-03-07 PROCEDURE — 85730 THROMBOPLASTIN TIME PARTIAL: CPT

## 2023-03-07 PROCEDURE — 36415 COLL VENOUS BLD VENIPUNCTURE: CPT

## 2023-03-07 RX ORDER — ASPIRIN/CALCIUM CARB/MAGNESIUM 324 MG
162 TABLET ORAL ONCE
Refills: 0 | Status: COMPLETED | OUTPATIENT
Start: 2023-03-07 | End: 2023-03-07

## 2023-03-07 RX ORDER — TAMSULOSIN HYDROCHLORIDE 0.4 MG/1
0.4 CAPSULE ORAL DAILY
Refills: 0 | Status: DISCONTINUED | OUTPATIENT
Start: 2023-03-07 | End: 2023-03-17

## 2023-03-07 RX ORDER — BUMETANIDE 0.25 MG/ML
5 INJECTION INTRAMUSCULAR; INTRAVENOUS
Qty: 20 | Refills: 0 | Status: DISCONTINUED | OUTPATIENT
Start: 2023-03-07 | End: 2023-03-07

## 2023-03-07 RX ORDER — ASPIRIN/CALCIUM CARB/MAGNESIUM 324 MG
81 TABLET ORAL DAILY
Refills: 0 | Status: DISCONTINUED | OUTPATIENT
Start: 2023-03-07 | End: 2023-03-17

## 2023-03-07 RX ORDER — INSULIN LISPRO 100/ML
5 VIAL (ML) SUBCUTANEOUS
Refills: 0 | Status: DISCONTINUED | OUTPATIENT
Start: 2023-03-07 | End: 2023-03-17

## 2023-03-07 RX ORDER — BUMETANIDE 0.25 MG/ML
2 INJECTION INTRAMUSCULAR; INTRAVENOUS
Qty: 20 | Refills: 0 | Status: DISCONTINUED | OUTPATIENT
Start: 2023-03-07 | End: 2023-03-14

## 2023-03-07 RX ORDER — INSULIN LISPRO 100/ML
VIAL (ML) SUBCUTANEOUS
Refills: 0 | Status: DISCONTINUED | OUTPATIENT
Start: 2023-03-07 | End: 2023-03-17

## 2023-03-07 RX ORDER — HEPARIN SODIUM 5000 [USP'U]/ML
5000 INJECTION INTRAVENOUS; SUBCUTANEOUS EVERY 6 HOURS
Refills: 0 | Status: DISCONTINUED | OUTPATIENT
Start: 2023-03-07 | End: 2023-03-07

## 2023-03-07 RX ORDER — DEXTROSE 50 % IN WATER 50 %
15 SYRINGE (ML) INTRAVENOUS ONCE
Refills: 0 | Status: DISCONTINUED | OUTPATIENT
Start: 2023-03-07 | End: 2023-03-08

## 2023-03-07 RX ORDER — SACUBITRIL AND VALSARTAN 24; 26 MG/1; MG/1
1 TABLET, FILM COATED ORAL
Refills: 0 | Status: DISCONTINUED | OUTPATIENT
Start: 2023-03-07 | End: 2023-03-08

## 2023-03-07 RX ORDER — INSULIN GLARGINE 100 [IU]/ML
30 INJECTION, SOLUTION SUBCUTANEOUS
Qty: 0 | Refills: 0 | DISCHARGE

## 2023-03-07 RX ORDER — SODIUM CHLORIDE 9 MG/ML
1000 INJECTION, SOLUTION INTRAVENOUS
Refills: 0 | Status: DISCONTINUED | OUTPATIENT
Start: 2023-03-07 | End: 2023-03-08

## 2023-03-07 RX ORDER — HEPARIN SODIUM 5000 [USP'U]/ML
5000 INJECTION INTRAVENOUS; SUBCUTANEOUS ONCE
Refills: 0 | Status: DISCONTINUED | OUTPATIENT
Start: 2023-03-07 | End: 2023-03-09

## 2023-03-07 RX ORDER — ATORVASTATIN CALCIUM 80 MG/1
40 TABLET, FILM COATED ORAL AT BEDTIME
Refills: 0 | Status: DISCONTINUED | OUTPATIENT
Start: 2023-03-07 | End: 2023-03-17

## 2023-03-07 RX ORDER — HEPARIN SODIUM 5000 [USP'U]/ML
5000 INJECTION INTRAVENOUS; SUBCUTANEOUS EVERY 6 HOURS
Refills: 0 | Status: DISCONTINUED | OUTPATIENT
Start: 2023-03-07 | End: 2023-03-09

## 2023-03-07 RX ORDER — METOPROLOL TARTRATE 50 MG
25 TABLET ORAL DAILY
Refills: 0 | Status: DISCONTINUED | OUTPATIENT
Start: 2023-03-07 | End: 2023-03-15

## 2023-03-07 RX ORDER — OMEPRAZOLE 10 MG/1
1 CAPSULE, DELAYED RELEASE ORAL
Qty: 0 | Refills: 0 | DISCHARGE

## 2023-03-07 RX ORDER — FAMOTIDINE 10 MG/ML
1 INJECTION INTRAVENOUS
Qty: 0 | Refills: 0 | DISCHARGE

## 2023-03-07 RX ORDER — DEXTROSE 50 % IN WATER 50 %
25 SYRINGE (ML) INTRAVENOUS ONCE
Refills: 0 | Status: DISCONTINUED | OUTPATIENT
Start: 2023-03-07 | End: 2023-03-08

## 2023-03-07 RX ORDER — RANOLAZINE 500 MG/1
500 TABLET, FILM COATED, EXTENDED RELEASE ORAL
Refills: 0 | Status: DISCONTINUED | OUTPATIENT
Start: 2023-03-07 | End: 2023-03-17

## 2023-03-07 RX ORDER — DEXTROSE 50 % IN WATER 50 %
12.5 SYRINGE (ML) INTRAVENOUS ONCE
Refills: 0 | Status: DISCONTINUED | OUTPATIENT
Start: 2023-03-07 | End: 2023-03-08

## 2023-03-07 RX ORDER — HEPARIN SODIUM 5000 [USP'U]/ML
INJECTION INTRAVENOUS; SUBCUTANEOUS
Qty: 25000 | Refills: 0 | Status: DISCONTINUED | OUTPATIENT
Start: 2023-03-07 | End: 2023-03-09

## 2023-03-07 RX ORDER — FUROSEMIDE 40 MG
40 TABLET ORAL ONCE
Refills: 0 | Status: COMPLETED | OUTPATIENT
Start: 2023-03-07 | End: 2023-03-07

## 2023-03-07 RX ORDER — PANTOPRAZOLE SODIUM 20 MG/1
40 TABLET, DELAYED RELEASE ORAL
Refills: 0 | Status: DISCONTINUED | OUTPATIENT
Start: 2023-03-07 | End: 2023-03-17

## 2023-03-07 RX ORDER — ACETAMINOPHEN 500 MG
1000 TABLET ORAL ONCE
Refills: 0 | Status: COMPLETED | OUTPATIENT
Start: 2023-03-07 | End: 2023-03-07

## 2023-03-07 RX ORDER — METOPROLOL TARTRATE 50 MG
50 TABLET ORAL AT BEDTIME
Refills: 0 | Status: DISCONTINUED | OUTPATIENT
Start: 2023-03-07 | End: 2023-03-15

## 2023-03-07 RX ORDER — GLUCAGON INJECTION, SOLUTION 0.5 MG/.1ML
1 INJECTION, SOLUTION SUBCUTANEOUS ONCE
Refills: 0 | Status: DISCONTINUED | OUTPATIENT
Start: 2023-03-07 | End: 2023-03-08

## 2023-03-07 RX ORDER — HEPARIN SODIUM 5000 [USP'U]/ML
5000 INJECTION INTRAVENOUS; SUBCUTANEOUS ONCE
Refills: 0 | Status: DISCONTINUED | OUTPATIENT
Start: 2023-03-07 | End: 2023-03-07

## 2023-03-07 RX ORDER — ACETAMINOPHEN 500 MG
650 TABLET ORAL EVERY 6 HOURS
Refills: 0 | Status: DISCONTINUED | OUTPATIENT
Start: 2023-03-07 | End: 2023-03-17

## 2023-03-07 RX ORDER — SITAGLIPTIN 50 MG/1
1 TABLET, FILM COATED ORAL
Qty: 0 | Refills: 0 | DISCHARGE

## 2023-03-07 RX ORDER — BUMETANIDE 0.25 MG/ML
2 INJECTION INTRAMUSCULAR; INTRAVENOUS ONCE
Refills: 0 | Status: COMPLETED | OUTPATIENT
Start: 2023-03-07 | End: 2023-03-07

## 2023-03-07 RX ORDER — INSULIN GLARGINE 100 [IU]/ML
20 INJECTION, SOLUTION SUBCUTANEOUS AT BEDTIME
Refills: 0 | Status: DISCONTINUED | OUTPATIENT
Start: 2023-03-07 | End: 2023-03-17

## 2023-03-07 RX ORDER — LEVOTHYROXINE SODIUM 125 MCG
200 TABLET ORAL DAILY
Refills: 0 | Status: DISCONTINUED | OUTPATIENT
Start: 2023-03-07 | End: 2023-03-17

## 2023-03-07 RX ADMIN — Medication 1000 MILLIGRAM(S): at 23:00

## 2023-03-07 RX ADMIN — Medication 400 MILLIGRAM(S): at 23:22

## 2023-03-07 RX ADMIN — HEPARIN SODIUM 1000 UNIT(S)/HR: 5000 INJECTION INTRAVENOUS; SUBCUTANEOUS at 23:22

## 2023-03-07 RX ADMIN — BUMETANIDE 10 MG/HR: 0.25 INJECTION INTRAMUSCULAR; INTRAVENOUS at 21:08

## 2023-03-07 RX ADMIN — Medication 40 MILLIGRAM(S): at 16:23

## 2023-03-07 RX ADMIN — BUMETANIDE 2 MILLIGRAM(S): 0.25 INJECTION INTRAMUSCULAR; INTRAVENOUS at 20:09

## 2023-03-07 RX ADMIN — BUMETANIDE 25 MG/HR: 0.25 INJECTION INTRAMUSCULAR; INTRAVENOUS at 20:09

## 2023-03-07 NOTE — ED PROVIDER NOTE - PROGRESS NOTE DETAILS
AH - Bedside ultrasound showing bilateral B-lines with bilateral pleural effusions.  Chest x-ray showing fluid overload.  BNP elevated from baseline.  Lasix given.  Troponin 0.03, code STEMI canceled by cardiology.  Patient feeling much better, respiratory status significantly improved on BiPAP.  Lactate 1.0.  Spoke to cardiology fellow Btesy, recommending CCU admission.  Signed out to CCU resident Bux

## 2023-03-07 NOTE — ED ADULT TRIAGE NOTE - CHIEF COMPLAINT QUOTE
SOB and chest pain, stemi seen on ems ekg strip en route to ed, stemi code activated on arrival to ed

## 2023-03-07 NOTE — ED PROVIDER NOTE - CLINICAL SUMMARY MEDICAL DECISION MAKING FREE TEXT BOX
77-year-old male presents to the emergency department for shortness of breath worsening over the past several days refractory to increased diuretic use as per his cardiologist administered by his wife.  He admits to PND and orthopnea with exercise intolerance progressively worsening.  He saw his PMD internist today who activated EMS for transit, EMS felt his EKG was a STEMI and presented to ED as STEMI prenotification with minimal communication by the 9 1 system prior to patient arrival.  We spoke to the patient's primary cardiologist, there is no indication for acute emergent reperfusion strategy and preference for medical management with noninvasive positive pressure, screening labs and imaging.  Patient was given diuretics after confirmation of clinical suspicion with point-of-care bedside ultrasound for echo, x-ray imaging and blood work.  He will be admitted to the CCU for decompensated congestive heart failure with elevated troponin and likely unstable angina with dyspnea as his presenting anginal equivalent.

## 2023-03-07 NOTE — ED PROVIDER NOTE - CONSIDERATION OF ADMISSION OBSERVATION
7-year-old male presents for respiratory failure found to be in congestive heart failure will admit to CCU setting Consideration of Admission/Observation

## 2023-03-07 NOTE — ED PROVIDER NOTE - CARE PLAN
1 Principal Discharge DX:	Acute exacerbation of CHF (congestive heart failure)  Secondary Diagnosis:	Non-ST elevation MI (NSTEMI)

## 2023-03-07 NOTE — H&P ADULT - NSHPPHYSICALEXAM_GEN_ALL_CORE
PHYSICAL EXAM:  GENERAL: NAD, speaks in full sentences  HEAD:  Atraumatic, Normocephalic  EYES: EOMI, PERRLA,  NECK: Supple  CHEST/LUNG: b/l crackles heard  HEART: Regular rate and rhythm; No murmurs;   ABDOMEN: Soft, Nontender, Nondistended; Bowel sounds present; No guarding  EXTREMITIES:  b/l pitting edema  PSYCH: AAOx3  NEUROLOGY: non-focal  SKIN: No rashes or lesions

## 2023-03-07 NOTE — PATIENT PROFILE ADULT - FALL HARM RISK - TYPE OF ASSESSMENT
I reviewed the H&P, I examined the patient, and there are no changes in the patient's condition.   Admission

## 2023-03-07 NOTE — ED PROVIDER NOTE - OBJECTIVE STATEMENT
77-year-old male presents to the emergency department by EMS from doctor's office for evaluation of difficulty breathing and possible STEMI.  EMS states their prehospital EKG demonstrated STEMI and case was called in to 911 system for prearrival.  Limited prearrival instructions, crew arrived prior to call.  In the emergency department the patient complains of worsening dyspnea over the last 4 days, started on Friday with a cough, progressed to PND and orthopnea for several days despite escalation in diuretics.  Most of this history is provided by the wife as the patient was too dyspneic to provide it on his own.  She denies him having chest pain fever or chills or other constitutional symptoms.  She denies him having medication or dietary noncompliance.    Patient has a known disease burden with multiple vessel disease, 6 years ago had four-vessel bypass.  He is followed by PMD cardio Dr. Godinez with no recent changes in status.  He was seen by his PMD internist today for an unwell visit due to the progressively worsening dyspnea and exercise intolerance.

## 2023-03-07 NOTE — ED ADULT NURSE NOTE - SUICIDE SCREENING QUESTION 2
Referring Provider: Clair Marques MD    CC: Right breast abscess    HPI:    40 year old female coming in with a recurrent right breast abscess.      She reports initial Right breast abscess onset 4 years ago s/p right biopsy, results \"benign clogged milk duct\" per patient. She reports intermittent recurrence, typically in the week or 2 leading up to menstrual cycle.     She had acute reoccurrence one month ago, reports right breast pain rated 8/10, breast fullness and right nipple retraction. Denies fever, chills, overlying skin changes. Breast spontaneously drained yellow/gree/bloody purulent discharge. Denies nipple discharge. Patient sought evaluation by PCP and was placed on 10 day course of Clindamycin with resolution of symptoms. She does report yeast infection s/p last antibiotic course.     She presents today reporting increasing right breast tenderness and fullness.      Family history is significant for paternal grandmother diagnosed with bilateral breast cancer at age 65,  at age 87.   Tyrer-Cuzick: 25.1%    She endorses chronic right ankle pain associated with right ankle tendonitis. She denies new onset headaches, weight loss, point tenderness in the spine, or new cough.     Imaging Work Up: Reviewed and demonstrated    BLDM and MARNI 21 @[facility]:  Scattered fibroglandular bilaterally.  An external skin marker was placed over the patient's palpable area of concern in the right breast. There is skin thickening of the periareolar right breast, corresponding to the area of palpable concern.    No significant masses, calcifications, or other findings are seen in either breast.  Ultrasound of the palpable area is recommended.    MARNI:  9 mm irregular lesion with irregular margin RIGHT 3:00 retroareolar region likely represents mastitis/cellulitis in the correct clinical setting and is probably benign.    A follow-up right ultrasound in 2 months is recommended to re-evaluate. If  lesion resolves completely, follow up ultrasound can be canceled.    BIRADS 3    The patient’s last imaging prior to this was: n/a   Any Previous Biopsies/Personal Hx of breast CA: Yes. Right breast biopsy ~4 years ago - \"benign clogged milk duct\" per patient.   Family history of breast or ovarian cancer: Yes. Paternal grandmother diagnosed with bilateral breast cancer at age 65,  age 87.     Past Medical History: Reviewed  Past Medical History:   Diagnosis Date   • Allergy    • GERD (gastroesophageal reflux disease)    • Right ankle tendonitis        Past Surgical History: Reviewed  Past Surgical History:   Procedure Laterality Date   • Breast biopsy      abscess drained       Current Medication: Reviewed and updated  Current Outpatient Medications   Medication Sig Dispense Refill   • Norlyda 0.35 MG tablet TAKE 1 TABLET BY MOUTH DAILY 28 tablet 2   • ibuprofen (MOTRIN) 600 MG tablet Take 1 tablet by mouth 2 times daily as needed for Pain. Take with food 60 tablet 1   • clindamycin (Cleocin) 300 MG capsule Take 1 capsule by mouth 3 times daily for 10 days. 30 capsule 0   • fluconazole (Diflucan) 150 MG tablet Take 1 tablet by mouth 1 time for 1 dose. 1 tablet 0     No current facility-administered medications for this visit.       Allergies: Reviewed   ALLERGIES:  Penicillin g    Social History: Reviewed     FAMILY HISTORY: Reviewed  Family History   Problem Relation Age of Onset   • Patient is unaware of any medical problems Mother    • Congestive Heart Failure Father    • COPD Father    • Cancer, Breast Paternal Grandmother 65        partial mastectomy,  age 87   • Cancer, Colon Neg Hx    • Cancer, Endometrial Neg Hx    • Cancer, Ovarian Neg Hx    • Diabetes Neg Hx    • Hypertension Neg Hx        GYN History:    .  P: 0.   1st birth at  n/a  Breast feeding n/a   Menarche: age 11. LMP: 22.  OCP Use: yes, 2 years use. HRT Use: No.  Bra Size: 38DDD.      Review of Symptoms:   General: No  n/f/v/c, no recent weight loss  Eyes:  No recent vision changes  ENT: No new epistaxis  Cardiovascular:  No chest pain or palpitations   Respiratory:  No new onset shortness of breath   Gastrointestinal:  No new diarrhea or constipation, no new abdominal pain  Genitourinary:  No dysuria  Musculoskeletal: + chronic right ankle pain. No new injuries  Neurologic:  No new memory loss  Endocrine: No new hot or cold intolerance    Physical Exam:  Vitals:    09/27/21 0834   BP: 120/69   Pulse: (!) 101   Resp: 18   Temp: 98 °F (36.7 °C)       General: NAD  Head: Atraumatic  Eyes: No scleral icterus  Mouth: Moist mucous membranes  CV: Regular rate and rhythm  Respiratory: Normal respiratory effort  Lymph: No supraclavicular, posterior/anterior cervical LAD  Breast: Symmetrical Breasts           Right breast: ~1cm x 1cm elliptical scar 3:00 ~2cmfn from prior abscess with underlying induration.  tenderness to palpation right breast 3:00. No discharge, no nipple discharge. No erythema or other overlying skin changes.           Left breast: No dominant masses, ND, or skin changes.          Axilla: No axillary lymphadenopathy bilaterally.   Focused Ultrasound: n/a  Abdomen: Soft, non tender  Genitourinary: No flank tenderness  Musculoskeletal: No significant calf swelling  Neurologic: No gross deficits    Physical Exam    Assessment:   40 year old patient presenting with symptoms of recurrent mastitis with abscess.  Exam with tenderness on right breast. Discussed natural history of breast infections and warning signs for what may require further intervention. We discussed patient's smoking history places her at high risk for reoccurrence. She has cut back smoking significantly, and reports she continues to try and completely quit. Due to frequency of abscess reoccurrence, we discussed options for surgical intervention including surgical excision of breast cyst/abscess. Patient would like to proceed with surgical excision.     The  patient is high risk, and we discussed breast cancer genetics, as well as reviewing the patient’s Tyrer-Cuzick score of 25.1%. We discussed risk reducing strategies, including chemoprevention and its risks and benefits, as demonstrated by the STAR trial. The patient was given handouts to help the patient better understand methods for prevention, we went over these risk reduction handouts. We discussed the high-risk follow up and will have the patient undergo screening mammograms and screening breast MRIs starting at age 40, alternating every 6 months. I answered all questions, and they verbalized understanding of the content of our discussion at the end of the office visit today.   The following clinical trials were presented: n/a   The patient does not qualify for genetic testing.    The patient is tachycardic  on exam today; however, there is low concern for acute processes.     Plan:   RX: Clindamycin, 10 days, 150mg Fluconazole oral tablet, e-prescribed to patient's pharmacy.   Discussed adjunctive probiotic therapy.  Advised patient on heat application to the affected site for 10 min every 2-3 hours for improved perfusion and antibiotic penetration.    Advised to take OTC pain meds as needed.  Advised on supportive therapies such as keeping hands clean, wearing bra that provides adequate support.  Instructed patient on signs and symptoms of worsening wound infection including pain, fever, redness, swelling, and tracking. Instructed patient to call clinic if symptoms worsen.    Surgery Scheduling to contact patient to arrange surgical excision of right breast cyst/abcess.   Preoperative orders placed. - CBC, CMP, EKG, CXR    Patient seen with Moira Jimenez PA-C and agrees with plan.     On 9/27/2021, Moira CHURCH PA-C scribed the services personally performed by Bonita Carvajal MD     No

## 2023-03-07 NOTE — ED PROVIDER NOTE - ATTENDING CONTRIBUTION TO CARE
I personally evaluated the patient. I reviewed the Resident’s or Physician Assistant’s note (as assigned above), and agree with the findings and plan except as documented in my note.     see HPI Exam & MDM for my notes

## 2023-03-07 NOTE — PATIENT PROFILE ADULT - FALL HARM RISK - HARM RISK INTERVENTIONS

## 2023-03-07 NOTE — H&P ADULT - NSICDXFAMILYHX_GEN_ALL_CORE_FT
FAMILY HISTORY:  Mother  Still living? Unknown  Family history of sinus bradycardia, Age at diagnosis: Age Unknown

## 2023-03-07 NOTE — ED PROVIDER NOTE - PHYSICAL EXAMINATION
GEN: male in moderate respiratory distress   HEENT: non icteric mucosa dry EOMI   CHEST: bilateral diminished sounds at bases with coarse ronchi throughout, increased work of breathing with accessory muscle use  CV: pulses intact   ABD: soft, non rigid  EXT: FROM x 4 NVI 1+ edema bilaterally  NEURO: AAO 3 no focal deficits.   SKIN: no pallor no diaphoresis  PSYCH: normal mood and mentation

## 2023-03-07 NOTE — H&P ADULT - HISTORY OF PRESENT ILLNESS
77-year-old male with pmhx of multiple vessel disease s/p four-vessel bypass 6 years ago, DM, HFrEF 25-30% presents to the ED from doctor's office for evaluation of difficulty breathing and possible STEMI.  EMS states their prehospital EKG demonstrated STEMI and case was called in to 911 system for prearrival. In the emergency department the patient complains of worsening dyspnea over the last 4 days, started on Friday with a cough, progressed to PND and orthopnea for several days despite escalation in diuretics. Also reports chest pain on arrival as well. He is followed by cardiologist Dr. Godinez with no recent changes in status.    In the ED, patient is on bipap comfortable and not in respiratory distress. Currently hemodynamically stable with no drips.     Vital Signs Last 24 Hrs  T(C): --  T(F): --  HR: 98 (07 Mar 2023 15:25) (95 - 105)  BP: 132/66 (07 Mar 2023 15:25) (132/66 - 132/66)  BP(mean): --  RR: 32 (07 Mar 2023 15:25) (30 - 32)  SpO2: 100% (07 Mar 2023 15:25) (95% - 100%)    Parameters below as of 07 Mar 2023 15:25  Patient On (Oxygen Delivery Method): BiPAP/CPAP

## 2023-03-07 NOTE — H&P ADULT - NSHPLABSRESULTS_GEN_ALL_CORE
12.1   15.45 )-----------( 297      ( 07 Mar 2023 15:10 )             36.8       03-07    138  |  103  |  31<H>  ----------------------------<  166<H>  5.0   |  25  |  1.3    Ca    8.7      07 Mar 2023 15:10    TPro  5.9<L>  /  Alb  3.0<L>  /  TBili  0.6  /  DBili  x   /  AST  49<H>  /  ALT  21  /  AlkPhos  103  03-07                      Lactate Trend  03-07 @ 15:10 Lactate:1.0       CARDIAC MARKERS ( 07 Mar 2023 15:10 )  x     / 0.03 ng/mL / x     / x     / x            CAPILLARY BLOOD GLUCOSE

## 2023-03-07 NOTE — H&P ADULT - ASSESSMENT
IMPRESSION:  HF exacerbation  NSTEMI  HO HFrEF 25-30% s/p AICD  HO CAD s/p CABG  HO DM  HO hypothyroidism      PLAN:    CNS: Avoid sedatives    HEENT: Oral care    PULMONARY: HOB @ 45 degrees. Aspiration precautions. C/w bipap, alt with NC     CARDIOVASCULAR: trend trops, if uptrending then start plavix. start Heparin gtt. Bumex 2 mg push then start gtt. Keep strict I/Os. Keep negative balance. EKg in am. ABG am.     GI: GI prophylaxis. Feeding: . Bowel regimen    RENAL: Follow up renal function and lytes. Correct as needed. Keep potassium >4 and magnesium >2    INFECTIOUS DISEASE: Monitor vital signs. Follow up cultures    HEMATOLOGICAL: DVT prophylaxis    ENDOCRINE: Follow up fasting sugar. Insulin protocol if needed    MUSCULOSKELETAL: bedrest    DISPO: CCU

## 2023-03-08 LAB
A1C WITH ESTIMATED AVERAGE GLUCOSE RESULT: 6.5 % — HIGH (ref 4–5.6)
ALBUMIN SERPL ELPH-MCNC: 3 G/DL — LOW (ref 3.5–5.2)
ALP SERPL-CCNC: 96 U/L — SIGNIFICANT CHANGE UP (ref 30–115)
ALT FLD-CCNC: 17 U/L — SIGNIFICANT CHANGE UP (ref 0–41)
ANION GAP SERPL CALC-SCNC: 15 MMOL/L — HIGH (ref 7–14)
APTT BLD: 47.4 SEC — HIGH (ref 27–39.2)
APTT BLD: 49 SEC — HIGH (ref 27–39.2)
APTT BLD: 55.7 SEC — HIGH (ref 27–39.2)
APTT BLD: 61.2 SEC — HIGH (ref 27–39.2)
AST SERPL-CCNC: 25 U/L — SIGNIFICANT CHANGE UP (ref 0–41)
BASOPHILS # BLD AUTO: 0.05 K/UL — SIGNIFICANT CHANGE UP (ref 0–0.2)
BASOPHILS NFR BLD AUTO: 0.4 % — SIGNIFICANT CHANGE UP (ref 0–1)
BILIRUB SERPL-MCNC: 0.6 MG/DL — SIGNIFICANT CHANGE UP (ref 0.2–1.2)
BUN SERPL-MCNC: 30 MG/DL — HIGH (ref 10–20)
CALCIUM SERPL-MCNC: 8.6 MG/DL — SIGNIFICANT CHANGE UP (ref 8.4–10.5)
CHLORIDE SERPL-SCNC: 104 MMOL/L — SIGNIFICANT CHANGE UP (ref 98–110)
CO2 SERPL-SCNC: 25 MMOL/L — SIGNIFICANT CHANGE UP (ref 17–32)
CREAT SERPL-MCNC: 1.5 MG/DL — SIGNIFICANT CHANGE UP (ref 0.7–1.5)
EGFR: 48 ML/MIN/1.73M2 — LOW
EOSINOPHIL # BLD AUTO: 0.3 K/UL — SIGNIFICANT CHANGE UP (ref 0–0.7)
EOSINOPHIL NFR BLD AUTO: 2.2 % — SIGNIFICANT CHANGE UP (ref 0–8)
ESTIMATED AVERAGE GLUCOSE: 140 MG/DL — HIGH (ref 68–114)
GLUCOSE BLDC GLUCOMTR-MCNC: 100 MG/DL — HIGH (ref 70–99)
GLUCOSE BLDC GLUCOMTR-MCNC: 118 MG/DL — HIGH (ref 70–99)
GLUCOSE BLDC GLUCOMTR-MCNC: 145 MG/DL — HIGH (ref 70–99)
GLUCOSE BLDC GLUCOMTR-MCNC: 181 MG/DL — HIGH (ref 70–99)
GLUCOSE SERPL-MCNC: 106 MG/DL — HIGH (ref 70–99)
HCT VFR BLD CALC: 38.8 % — LOW (ref 42–52)
HGB BLD-MCNC: 12.4 G/DL — LOW (ref 14–18)
IMM GRANULOCYTES NFR BLD AUTO: 0.5 % — HIGH (ref 0.1–0.3)
INR BLD: 1.39 RATIO — HIGH (ref 0.65–1.3)
LYMPHOCYTES # BLD AUTO: 1.9 K/UL — SIGNIFICANT CHANGE UP (ref 1.2–3.4)
LYMPHOCYTES # BLD AUTO: 13.7 % — LOW (ref 20.5–51.1)
MAGNESIUM SERPL-MCNC: 1.5 MG/DL — LOW (ref 1.8–2.4)
MCHC RBC-ENTMCNC: 27.1 PG — SIGNIFICANT CHANGE UP (ref 27–31)
MCHC RBC-ENTMCNC: 32 G/DL — SIGNIFICANT CHANGE UP (ref 32–37)
MCV RBC AUTO: 84.9 FL — SIGNIFICANT CHANGE UP (ref 80–94)
MONOCYTES # BLD AUTO: 1.13 K/UL — HIGH (ref 0.1–0.6)
MONOCYTES NFR BLD AUTO: 8.2 % — SIGNIFICANT CHANGE UP (ref 1.7–9.3)
NEUTROPHILS # BLD AUTO: 10.39 K/UL — HIGH (ref 1.4–6.5)
NEUTROPHILS NFR BLD AUTO: 75 % — SIGNIFICANT CHANGE UP (ref 42.2–75.2)
NRBC # BLD: 0 /100 WBCS — SIGNIFICANT CHANGE UP (ref 0–0)
PHOSPHATE SERPL-MCNC: 3 MG/DL — SIGNIFICANT CHANGE UP (ref 2.1–4.9)
PLATELET # BLD AUTO: 294 K/UL — SIGNIFICANT CHANGE UP (ref 130–400)
POTASSIUM SERPL-MCNC: 3.2 MMOL/L — LOW (ref 3.5–5)
POTASSIUM SERPL-SCNC: 3.2 MMOL/L — LOW (ref 3.5–5)
PROT SERPL-MCNC: 5.7 G/DL — LOW (ref 6–8)
PROTHROM AB SERPL-ACNC: 16 SEC — HIGH (ref 9.95–12.87)
RBC # BLD: 4.57 M/UL — LOW (ref 4.7–6.1)
RBC # FLD: 14.7 % — HIGH (ref 11.5–14.5)
SODIUM SERPL-SCNC: 144 MMOL/L — SIGNIFICANT CHANGE UP (ref 135–146)
TROPONIN T SERPL-MCNC: 0.03 NG/ML — CRITICAL HIGH
TROPONIN T SERPL-MCNC: 0.03 NG/ML — CRITICAL HIGH
WBC # BLD: 13.84 K/UL — HIGH (ref 4.8–10.8)
WBC # FLD AUTO: 13.84 K/UL — HIGH (ref 4.8–10.8)

## 2023-03-08 PROCEDURE — 71045 X-RAY EXAM CHEST 1 VIEW: CPT | Mod: 26

## 2023-03-08 PROCEDURE — 99233 SBSQ HOSP IP/OBS HIGH 50: CPT

## 2023-03-08 RX ORDER — POTASSIUM CHLORIDE 20 MEQ
40 PACKET (EA) ORAL EVERY 4 HOURS
Refills: 0 | Status: COMPLETED | OUTPATIENT
Start: 2023-03-08 | End: 2023-03-08

## 2023-03-08 RX ORDER — MAGNESIUM SULFATE 500 MG/ML
2 VIAL (ML) INJECTION EVERY 4 HOURS
Refills: 0 | Status: COMPLETED | OUTPATIENT
Start: 2023-03-08 | End: 2023-03-08

## 2023-03-08 RX ORDER — POTASSIUM CHLORIDE 20 MEQ
20 PACKET (EA) ORAL
Refills: 0 | Status: DISCONTINUED | OUTPATIENT
Start: 2023-03-08 | End: 2023-03-08

## 2023-03-08 RX ORDER — SACUBITRIL AND VALSARTAN 24; 26 MG/1; MG/1
1 TABLET, FILM COATED ORAL
Refills: 0 | Status: DISCONTINUED | OUTPATIENT
Start: 2023-03-09 | End: 2023-03-17

## 2023-03-08 RX ORDER — SACUBITRIL AND VALSARTAN 24; 26 MG/1; MG/1
1 TABLET, FILM COATED ORAL ONCE
Refills: 0 | Status: COMPLETED | OUTPATIENT
Start: 2023-03-08 | End: 2023-03-08

## 2023-03-08 RX ADMIN — RANOLAZINE 500 MILLIGRAM(S): 500 TABLET, FILM COATED, EXTENDED RELEASE ORAL at 17:45

## 2023-03-08 RX ADMIN — Medication 5 UNIT(S): at 12:33

## 2023-03-08 RX ADMIN — HEPARIN SODIUM 1150 UNIT(S)/HR: 5000 INJECTION INTRAVENOUS; SUBCUTANEOUS at 14:54

## 2023-03-08 RX ADMIN — Medication 25 GRAM(S): at 09:04

## 2023-03-08 RX ADMIN — INSULIN GLARGINE 20 UNIT(S): 100 INJECTION, SOLUTION SUBCUTANEOUS at 21:08

## 2023-03-08 RX ADMIN — Medication 5 UNIT(S): at 17:47

## 2023-03-08 RX ADMIN — BUMETANIDE 10 MG/HR: 0.25 INJECTION INTRAMUSCULAR; INTRAVENOUS at 20:32

## 2023-03-08 RX ADMIN — Medication 81 MILLIGRAM(S): at 12:32

## 2023-03-08 RX ADMIN — RANOLAZINE 500 MILLIGRAM(S): 500 TABLET, FILM COATED, EXTENDED RELEASE ORAL at 05:28

## 2023-03-08 RX ADMIN — BUMETANIDE 10 MG/HR: 0.25 INJECTION INTRAMUSCULAR; INTRAVENOUS at 05:28

## 2023-03-08 RX ADMIN — Medication 200 MICROGRAM(S): at 05:28

## 2023-03-08 RX ADMIN — HEPARIN SODIUM 1150 UNIT(S)/HR: 5000 INJECTION INTRAVENOUS; SUBCUTANEOUS at 06:18

## 2023-03-08 RX ADMIN — SACUBITRIL AND VALSARTAN 1 TABLET(S): 24; 26 TABLET, FILM COATED ORAL at 21:08

## 2023-03-08 RX ADMIN — Medication 40 MILLIEQUIVALENT(S): at 14:52

## 2023-03-08 RX ADMIN — BUMETANIDE 10 MG/HR: 0.25 INJECTION INTRAMUSCULAR; INTRAVENOUS at 14:50

## 2023-03-08 RX ADMIN — Medication 650 MILLIGRAM(S): at 21:28

## 2023-03-08 RX ADMIN — Medication 25 MILLIGRAM(S): at 05:28

## 2023-03-08 RX ADMIN — SACUBITRIL AND VALSARTAN 1 TABLET(S): 24; 26 TABLET, FILM COATED ORAL at 05:28

## 2023-03-08 RX ADMIN — Medication 20 MILLIEQUIVALENT(S): at 09:02

## 2023-03-08 RX ADMIN — ATORVASTATIN CALCIUM 40 MILLIGRAM(S): 80 TABLET, FILM COATED ORAL at 21:08

## 2023-03-08 RX ADMIN — Medication 25 GRAM(S): at 12:31

## 2023-03-08 RX ADMIN — Medication 50 MILLIGRAM(S): at 21:08

## 2023-03-08 RX ADMIN — Medication 650 MILLIGRAM(S): at 21:58

## 2023-03-08 RX ADMIN — Medication 40 MILLIEQUIVALENT(S): at 17:49

## 2023-03-08 RX ADMIN — Medication 2: at 17:47

## 2023-03-08 RX ADMIN — TAMSULOSIN HYDROCHLORIDE 0.4 MILLIGRAM(S): 0.4 CAPSULE ORAL at 12:32

## 2023-03-08 RX ADMIN — PANTOPRAZOLE SODIUM 40 MILLIGRAM(S): 20 TABLET, DELAYED RELEASE ORAL at 06:42

## 2023-03-08 RX ADMIN — SACUBITRIL AND VALSARTAN 1 TABLET(S): 24; 26 TABLET, FILM COATED ORAL at 17:44

## 2023-03-08 NOTE — PROGRESS NOTE ADULT - ATTENDING COMMENTS
Diuresing well with Bumex gtt. Can transition to IV pushes in next 24-48h. Await TTE with [facility]. D/w primary cardio re: R/LHC.

## 2023-03-08 NOTE — PROGRESS NOTE ADULT - ASSESSMENT
77 year old M known HFrEF (EF 25% in 2021) s/p CRT-D, paroxysmal Afib on eliquis, CAD s/p CABG in 2016, Bullous pemphigoid, Hypertension, dyslipidemia Present to the ED for shortness of breath and chest pain and found to have decompensated Heart failure.    # Decompensated HFrEF   # suspected worsening LV dysfunction  # Right pleural effusion   - c/w bumex IV drip @2mg/hr  - target -150ml/hr  - c/w Entresto  - c/w metoprolol  - monitor BMP  - strict I&Os  - BiPAP as needed  - TTE pending    # ACS/Unstable angina  - mildly elevated troponin but stable @ 0.02-0.3  - c/w aspirin  - c/w atorvastatin  - c/w ranolazine  - decision for LHC depends on risk vs benefit discussion  -   #Paroxysmal A.fib  - Eliquis stopped  - currently on heparin drip  - c/w metoprolol    #DM  - c/w insulin   - targer -180    #hypethyroid  - c/w levothyroxine  - f/u TSH    #dispo acute  #CODE status: DNI ( as per living will according to patient's wife)

## 2023-03-08 NOTE — PROGRESS NOTE ADULT - SUBJECTIVE AND OBJECTIVE BOX
SUBJECTIVE:    Patient is a 77y old Male who presents with a chief complaint of sob (07 Mar 2023 17:35)    Currently admitted to medicine with the primary diagnosis of Acute exacerbation of CHF (congestive heart failure)       Today is hospital day 1d. This morning he is resting comfortably in bed and reports no new issues or overnight events.     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     ALLERGIES:  contrast media (iodine-based) (Other)    MEDICATIONS:  STANDING MEDICATIONS  aspirin  chewable 81 milliGRAM(s) Oral daily  atorvastatin 40 milliGRAM(s) Oral at bedtime  buMETAnide Infusion 2 mG/Hr IV Continuous <Continuous>  heparin   Injectable 5000 Unit(s) SubCutaneous once  heparin  Infusion.  Unit(s)/Hr IV Continuous <Continuous>  insulin glargine Injectable (LANTUS) 20 Unit(s) SubCutaneous at bedtime  insulin lispro (ADMELOG) corrective regimen sliding scale   SubCutaneous three times a day before meals  insulin lispro Injectable (ADMELOG) 5 Unit(s) SubCutaneous three times a day before meals  levothyroxine 200 MICROGram(s) Oral daily  metoprolol tartrate 25 milliGRAM(s) Oral daily  metoprolol tartrate 50 milliGRAM(s) Oral at bedtime  pantoprazole    Tablet 40 milliGRAM(s) Oral before breakfast  potassium chloride   Powder 40 milliEquivalent(s) Oral every 4 hours  ranolazine 500 milliGRAM(s) Oral two times a day  sacubitril 24 mG/valsartan 26 mG 1 Tablet(s) Oral two times a day  tamsulosin 0.4 milliGRAM(s) Oral daily    PRN MEDICATIONS  acetaminophen     Tablet .. 650 milliGRAM(s) Oral every 6 hours PRN  heparin   Injectable 5000 Unit(s) SubCutaneous every 6 hours PRN    VITALS:   T(F): 98.6  HR: 83  BP: 156/74  RR: 27  SpO2: 99%    LABS:                        12.4   13.84 )-----------( 294      ( 08 Mar 2023 06:21 )             38.8     03-08    144  |  104  |  30<H>  ----------------------------<  106<H>  3.2<L>   |  25  |  1.5    Ca    8.6      08 Mar 2023 06:21  Phos  3.0     03-08  Mg     1.5     03-08    TPro  5.7<L>  /  Alb  3.0<L>  /  TBili  0.6  /  DBili  x   /  AST  25  /  ALT  17  /  AlkPhos  96  03-08    PT/INR - ( 08 Mar 2023 06:21 )   PT: 16.00 sec;   INR: 1.39 ratio         PTT - ( 08 Mar 2023 12:55 )  PTT:61.2 sec      Troponin T, Serum: 0.03 ng/mL *HH* (03-08-23 @ 06:21)  Troponin T, Serum: 0.03 ng/mL *HH* (03-08-23 @ 00:42)  Troponin T, Serum: 0.02 ng/mL *H* (03-07-23 @ 21:11)  Troponin T, Serum: 0.03 ng/mL *HH* (03-07-23 @ 15:10)  Lactate, Blood: 1.0 mmol/L (03-07-23 @ 15:10)      CARDIAC MARKERS ( 08 Mar 2023 06:21 )  x     / 0.03 ng/mL / x     / x     / x      CARDIAC MARKERS ( 08 Mar 2023 00:42 )  x     / 0.03 ng/mL / x     / x     / x      CARDIAC MARKERS ( 07 Mar 2023 21:11 )  x     / 0.02 ng/mL / x     / x     / x      CARDIAC MARKERS ( 07 Mar 2023 15:10 )  x     / 0.03 ng/mL / x     / x     / x          RADIOLOGY:  CXR (3/8/23): Bilateral opacities/effusions, increasing on the right    PHYSICAL EXAM:  GEN: No acute distress  LUNGS: Bilat. crackles R>L decreased breath sounds over lower 3rd of rt chest  HEART: Irregular s1s2  ABD: Soft, non-tender, non-distended.  EXT: +1 LLE  NEURO: AAOX3    Intravenous access:   NG tube:   Orta Catheter:

## 2023-03-09 ENCOUNTER — RESULT REVIEW (OUTPATIENT)
Age: 78
End: 2023-03-09

## 2023-03-09 LAB
ANION GAP SERPL CALC-SCNC: 10 MMOL/L — SIGNIFICANT CHANGE UP (ref 7–14)
ANION GAP SERPL CALC-SCNC: 15 MMOL/L — HIGH (ref 7–14)
APTT BLD: 50.5 SEC — HIGH (ref 27–39.2)
B PERT IGG+IGM PNL SER: ABNORMAL
BASOPHILS # BLD AUTO: 0.03 K/UL — SIGNIFICANT CHANGE UP (ref 0–0.2)
BASOPHILS NFR BLD AUTO: 0.2 % — SIGNIFICANT CHANGE UP (ref 0–1)
BUN SERPL-MCNC: 31 MG/DL — HIGH (ref 10–20)
BUN SERPL-MCNC: 33 MG/DL — HIGH (ref 10–20)
CALCIUM SERPL-MCNC: 8.9 MG/DL — SIGNIFICANT CHANGE UP (ref 8.4–10.5)
CALCIUM SERPL-MCNC: 9.1 MG/DL — SIGNIFICANT CHANGE UP (ref 8.4–10.5)
CHLORIDE SERPL-SCNC: 100 MMOL/L — SIGNIFICANT CHANGE UP (ref 98–110)
CHLORIDE SERPL-SCNC: 104 MMOL/L — SIGNIFICANT CHANGE UP (ref 98–110)
CK MB CFR SERPL CALC: 1.3 NG/ML — SIGNIFICANT CHANGE UP (ref 0.6–6.3)
CO2 SERPL-SCNC: 28 MMOL/L — SIGNIFICANT CHANGE UP (ref 17–32)
CO2 SERPL-SCNC: 31 MMOL/L — SIGNIFICANT CHANGE UP (ref 17–32)
COLOR FLD: YELLOW — SIGNIFICANT CHANGE UP
CREAT SERPL-MCNC: 1.4 MG/DL — SIGNIFICANT CHANGE UP (ref 0.7–1.5)
CREAT SERPL-MCNC: 1.5 MG/DL — SIGNIFICANT CHANGE UP (ref 0.7–1.5)
EGFR: 48 ML/MIN/1.73M2 — LOW
EGFR: 52 ML/MIN/1.73M2 — LOW
EOSINOPHIL # BLD AUTO: 0.38 K/UL — SIGNIFICANT CHANGE UP (ref 0–0.7)
EOSINOPHIL NFR BLD AUTO: 2.8 % — SIGNIFICANT CHANGE UP (ref 0–8)
FLUID INTAKE SUBSTANCE CLASS: SIGNIFICANT CHANGE UP
GAS PNL BLDA: SIGNIFICANT CHANGE UP
GLUCOSE BLDC GLUCOMTR-MCNC: 107 MG/DL — HIGH (ref 70–99)
GLUCOSE BLDC GLUCOMTR-MCNC: 107 MG/DL — HIGH (ref 70–99)
GLUCOSE BLDC GLUCOMTR-MCNC: 122 MG/DL — HIGH (ref 70–99)
GLUCOSE BLDC GLUCOMTR-MCNC: 124 MG/DL — HIGH (ref 70–99)
GLUCOSE BLDC GLUCOMTR-MCNC: 85 MG/DL — SIGNIFICANT CHANGE UP (ref 70–99)
GLUCOSE SERPL-MCNC: 123 MG/DL — HIGH (ref 70–99)
GLUCOSE SERPL-MCNC: 132 MG/DL — HIGH (ref 70–99)
HCT VFR BLD CALC: 40.3 % — LOW (ref 42–52)
HGB BLD-MCNC: 12.7 G/DL — LOW (ref 14–18)
IMM GRANULOCYTES NFR BLD AUTO: 0.6 % — HIGH (ref 0.1–0.3)
LYMPHOCYTES # BLD AUTO: 1.45 K/UL — SIGNIFICANT CHANGE UP (ref 1.2–3.4)
LYMPHOCYTES # BLD AUTO: 10.6 % — LOW (ref 20.5–51.1)
LYMPHOCYTES # FLD: 5 — SIGNIFICANT CHANGE UP
MAGNESIUM SERPL-MCNC: 1.8 MG/DL — SIGNIFICANT CHANGE UP (ref 1.8–2.4)
MAGNESIUM SERPL-MCNC: 2 MG/DL — SIGNIFICANT CHANGE UP (ref 1.8–2.4)
MCHC RBC-ENTMCNC: 27.2 PG — SIGNIFICANT CHANGE UP (ref 27–31)
MCHC RBC-ENTMCNC: 31.5 G/DL — LOW (ref 32–37)
MCV RBC AUTO: 86.3 FL — SIGNIFICANT CHANGE UP (ref 80–94)
MESOTHL CELL # FLD: 2 % — SIGNIFICANT CHANGE UP
MONOCYTES # BLD AUTO: 1.14 K/UL — HIGH (ref 0.1–0.6)
MONOCYTES NFR BLD AUTO: 8.3 % — SIGNIFICANT CHANGE UP (ref 1.7–9.3)
MONOS+MACROS # FLD: 24 % — SIGNIFICANT CHANGE UP
NEUTROPHILS # BLD AUTO: 10.59 K/UL — HIGH (ref 1.4–6.5)
NEUTROPHILS NFR BLD AUTO: 77.5 % — HIGH (ref 42.2–75.2)
NEUTROPHILS-BODY FLUID: 69 % — SIGNIFICANT CHANGE UP
NRBC # BLD: 0 /100 WBCS — SIGNIFICANT CHANGE UP (ref 0–0)
PLATELET # BLD AUTO: 300 K/UL — SIGNIFICANT CHANGE UP (ref 130–400)
POTASSIUM SERPL-MCNC: 3.8 MMOL/L — SIGNIFICANT CHANGE UP (ref 3.5–5)
POTASSIUM SERPL-MCNC: 4 MMOL/L — SIGNIFICANT CHANGE UP (ref 3.5–5)
POTASSIUM SERPL-SCNC: 3.8 MMOL/L — SIGNIFICANT CHANGE UP (ref 3.5–5)
POTASSIUM SERPL-SCNC: 4 MMOL/L — SIGNIFICANT CHANGE UP (ref 3.5–5)
RBC # BLD: 4.67 M/UL — LOW (ref 4.7–6.1)
RBC # FLD: 14.7 % — HIGH (ref 11.5–14.5)
RCV VOL RI: 2000 /UL — HIGH (ref 0–0)
SODIUM SERPL-SCNC: 143 MMOL/L — SIGNIFICANT CHANGE UP (ref 135–146)
SODIUM SERPL-SCNC: 145 MMOL/L — SIGNIFICANT CHANGE UP (ref 135–146)
TOTAL NUCLEATED CELL COUNT, BODY FLUID: 3328 /UL — SIGNIFICANT CHANGE UP
TROPONIN T SERPL-MCNC: 0.03 NG/ML — CRITICAL HIGH
TUBE TYPE: SIGNIFICANT CHANGE UP
WBC # BLD: 13.67 K/UL — HIGH (ref 4.8–10.8)
WBC # FLD AUTO: 13.67 K/UL — HIGH (ref 4.8–10.8)

## 2023-03-09 PROCEDURE — 88305 TISSUE EXAM BY PATHOLOGIST: CPT | Mod: 26

## 2023-03-09 PROCEDURE — 99222 1ST HOSP IP/OBS MODERATE 55: CPT | Mod: 25

## 2023-03-09 PROCEDURE — 99233 SBSQ HOSP IP/OBS HIGH 50: CPT | Mod: 25

## 2023-03-09 PROCEDURE — 32555 ASPIRATE PLEURA W/ IMAGING: CPT

## 2023-03-09 PROCEDURE — 71045 X-RAY EXAM CHEST 1 VIEW: CPT | Mod: 26,77,76

## 2023-03-09 PROCEDURE — 93306 TTE W/DOPPLER COMPLETE: CPT | Mod: 26

## 2023-03-09 PROCEDURE — 88112 CYTOPATH CELL ENHANCE TECH: CPT | Mod: 26

## 2023-03-09 PROCEDURE — 71045 X-RAY EXAM CHEST 1 VIEW: CPT | Mod: 26

## 2023-03-09 RX ORDER — CEFTRIAXONE 500 MG/1
INJECTION, POWDER, FOR SOLUTION INTRAMUSCULAR; INTRAVENOUS
Refills: 0 | Status: COMPLETED | OUTPATIENT
Start: 2023-03-09 | End: 2023-03-15

## 2023-03-09 RX ORDER — IPRATROPIUM/ALBUTEROL SULFATE 18-103MCG
3 AEROSOL WITH ADAPTER (GRAM) INHALATION ONCE
Refills: 0 | Status: COMPLETED | OUTPATIENT
Start: 2023-03-09 | End: 2023-03-09

## 2023-03-09 RX ORDER — IPRATROPIUM/ALBUTEROL SULFATE 18-103MCG
3 AEROSOL WITH ADAPTER (GRAM) INHALATION ONCE
Refills: 0 | Status: DISCONTINUED | OUTPATIENT
Start: 2023-03-09 | End: 2023-03-17

## 2023-03-09 RX ORDER — AZITHROMYCIN 500 MG/1
TABLET, FILM COATED ORAL
Refills: 0 | Status: DISCONTINUED | OUTPATIENT
Start: 2023-03-09 | End: 2023-03-14

## 2023-03-09 RX ORDER — CEFTRIAXONE 500 MG/1
1000 INJECTION, POWDER, FOR SOLUTION INTRAMUSCULAR; INTRAVENOUS ONCE
Refills: 0 | Status: COMPLETED | OUTPATIENT
Start: 2023-03-09 | End: 2023-03-09

## 2023-03-09 RX ORDER — AZITHROMYCIN 500 MG/1
500 TABLET, FILM COATED ORAL ONCE
Refills: 0 | Status: COMPLETED | OUTPATIENT
Start: 2023-03-09 | End: 2023-03-09

## 2023-03-09 RX ORDER — LIDOCAINE HCL 20 MG/ML
15 VIAL (ML) INJECTION ONCE
Refills: 0 | Status: COMPLETED | OUTPATIENT
Start: 2023-03-09 | End: 2023-03-09

## 2023-03-09 RX ORDER — AZITHROMYCIN 500 MG/1
500 TABLET, FILM COATED ORAL EVERY 24 HOURS
Refills: 0 | Status: DISCONTINUED | OUTPATIENT
Start: 2023-03-10 | End: 2023-03-14

## 2023-03-09 RX ORDER — CEFTRIAXONE 500 MG/1
1000 INJECTION, POWDER, FOR SOLUTION INTRAMUSCULAR; INTRAVENOUS EVERY 24 HOURS
Refills: 0 | Status: COMPLETED | OUTPATIENT
Start: 2023-03-10 | End: 2023-03-14

## 2023-03-09 RX ADMIN — Medication 25 MILLIGRAM(S): at 05:00

## 2023-03-09 RX ADMIN — TAMSULOSIN HYDROCHLORIDE 0.4 MILLIGRAM(S): 0.4 CAPSULE ORAL at 13:12

## 2023-03-09 RX ADMIN — RANOLAZINE 500 MILLIGRAM(S): 500 TABLET, FILM COATED, EXTENDED RELEASE ORAL at 05:00

## 2023-03-09 RX ADMIN — Medication 81 MILLIGRAM(S): at 13:12

## 2023-03-09 RX ADMIN — INSULIN GLARGINE 20 UNIT(S): 100 INJECTION, SOLUTION SUBCUTANEOUS at 21:54

## 2023-03-09 RX ADMIN — Medication 650 MILLIGRAM(S): at 04:23

## 2023-03-09 RX ADMIN — CEFTRIAXONE 1000 MILLIGRAM(S): 500 INJECTION, POWDER, FOR SOLUTION INTRAMUSCULAR; INTRAVENOUS at 11:27

## 2023-03-09 RX ADMIN — Medication 650 MILLIGRAM(S): at 04:53

## 2023-03-09 RX ADMIN — Medication 650 MILLIGRAM(S): at 18:59

## 2023-03-09 RX ADMIN — PANTOPRAZOLE SODIUM 40 MILLIGRAM(S): 20 TABLET, DELAYED RELEASE ORAL at 05:00

## 2023-03-09 RX ADMIN — Medication 5 UNIT(S): at 08:46

## 2023-03-09 RX ADMIN — BUMETANIDE 10 MG/HR: 0.25 INJECTION INTRAMUSCULAR; INTRAVENOUS at 15:37

## 2023-03-09 RX ADMIN — RANOLAZINE 500 MILLIGRAM(S): 500 TABLET, FILM COATED, EXTENDED RELEASE ORAL at 17:35

## 2023-03-09 RX ADMIN — Medication 50 MILLIGRAM(S): at 21:54

## 2023-03-09 RX ADMIN — ATORVASTATIN CALCIUM 40 MILLIGRAM(S): 80 TABLET, FILM COATED ORAL at 21:54

## 2023-03-09 RX ADMIN — SACUBITRIL AND VALSARTAN 1 TABLET(S): 24; 26 TABLET, FILM COATED ORAL at 05:00

## 2023-03-09 RX ADMIN — Medication 200 MICROGRAM(S): at 05:00

## 2023-03-09 RX ADMIN — AZITHROMYCIN 255 MILLIGRAM(S): 500 TABLET, FILM COATED ORAL at 11:27

## 2023-03-09 RX ADMIN — SACUBITRIL AND VALSARTAN 1 TABLET(S): 24; 26 TABLET, FILM COATED ORAL at 17:35

## 2023-03-09 RX ADMIN — Medication 3 MILLILITER(S): at 16:20

## 2023-03-09 NOTE — CONSULT NOTE ADULT - NSCONSULTADDITIONALINFOA_GEN_ALL_CORE
Impression:    CHF exacerbation  HO CHFrEF  Pulmonary edema  large rt pleural effusion  HO pAfib on AC  Ho CAD    Impression and plan are my own.

## 2023-03-09 NOTE — CONSULT NOTE ADULT - SUBJECTIVE AND OBJECTIVE BOX
Patient is a 77y old  Male who presents with a chief complaint of sob (09 Mar 2023 13:47)      HPI:  77-year-old male with pmhx of multiple vessel disease s/p four-vessel bypass 6 years ago, DM, HFrEF 25-30% presents to the ED from doctor's office for evaluation of difficulty breathing and possible STEMI.  EMS states their prehospital EKG demonstrated STEMI and case was called in to 911 system for prearrival. In the emergency department the patient complains of worsening dyspnea over the last 4 days, started on Friday with a cough, progressed to PND and orthopnea for several days despite escalation in diuretics. Also reports chest pain on arrival as well. He is followed by cardiologist Dr. Godinez with no recent changes in status.    In the ED, patient is on bipap comfortable and not in respiratory distress. Currently hemodynamically stable with no drips.     Vital Signs Last 24 Hrs  T(C): --  T(F): --  HR: 98 (07 Mar 2023 15:25) (95 - 105)  BP: 132/66 (07 Mar 2023 15:25) (132/66 - 132/66)  BP(mean): --  RR: 32 (07 Mar 2023 15:25) (30 - 32)  SpO2: 100% (07 Mar 2023 15:25) (95% - 100%)    Parameters below as of 07 Mar 2023 15:25  Patient On (Oxygen Delivery Method): BiPAP/CPAP       (07 Mar 2023 17:35)      PAST MEDICAL & SURGICAL HISTORY:  Coronary artery disease with other form of angina pectoris      Type 2 diabetes mellitus with other neurologic complication, with long-term current use of insulin      Hypertension, unspecified type      High cholesterol      Hypothyroidism, unspecified type      HFrEF (heart failure with reduced ejection fraction)      Artificial cardiac pacemaker      S/P CABG x 6      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  No cardiovascular or pulmonary family history       REVIEW OF SYSTEMS:    All ROS are negative exept per HPI       Allergies    contrast media (iodine-based) (Other)    Intolerances          PHYSICAL EXAM  Vital Signs Last 24 Hrs  T(C): 36.5 (09 Mar 2023 07:30), Max: 36.8 (09 Mar 2023 04:01)  T(F): 97.7 (09 Mar 2023 07:30), Max: 98.2 (09 Mar 2023 04:01)  HR: 90 (09 Mar 2023 12:15) (75 - 110)  BP: 133/70 (09 Mar 2023 12:15) (113/58 - 164/86)  BP(mean): 88 (09 Mar 2023 07:30) (80 - 117)  RR: 22 (09 Mar 2023 12:15) (16 - 22)  SpO2: 92% (09 Mar 2023 12:15) (92% - 98%)    Parameters below as of 09 Mar 2023 12:15  Patient On (Oxygen Delivery Method): nasal cannula  O2 Flow (L/min): 3      CONSTITUTIONAL:  mild distress    ENT:   Airway patent,     EYES:   Clear bilaterally,   pupils equal,   round and reactive to light.    CARDIAC:   Normal rate,   regular rhythm.    no edema    RESPIRATORY:   decreased breath sound bilateral bases    GASTROINTESTINAL:  Abdomen soft, non-tender,   No guarding,   Positive BS    MUSCULOSKELETAL:   Range of motion is not limited,    NEUROLOGICAL:   Alert and oriented   No motor deficits.    SKIN:   Skin normal color for race,   No evidence of rash.            LABS:                          12.7   13.67 )-----------( 300      ( 09 Mar 2023 06:33 )             40.3                                               03-09    145  |  104  |  31<H>  ----------------------------<  123<H>  3.8   |  31  |  1.4    Ca    8.9      09 Mar 2023 06:33  Phos  3.0     03-08  Mg     2.0     03-09    TPro  5.7<L>  /  Alb  3.0<L>  /  TBili  0.6  /  DBili  x   /  AST  25  /  ALT  17  /  AlkPhos  96  03-08      PT/INR - ( 08 Mar 2023 06:21 )   PT: 16.00 sec;   INR: 1.39 ratio         PTT - ( 09 Mar 2023 06:33 )  PTT:50.5 sec                                           CARDIAC MARKERS ( 08 Mar 2023 06:21 )  x     / 0.03 ng/mL / x     / x     / x      CARDIAC MARKERS ( 08 Mar 2023 00:42 )  x     / 0.03 ng/mL / x     / x     / x      CARDIAC MARKERS ( 07 Mar 2023 21:11 )  x     / 0.02 ng/mL / x     / x     / x                                                LIVER FUNCTIONS - ( 08 Mar 2023 06:21 )  Alb: 3.0 g/dL / Pro: 5.7 g/dL / ALK PHOS: 96 U/L / ALT: 17 U/L / AST: 25 U/L / GGT: x                                                                                                MEDICATIONS  (STANDING):  aspirin  chewable 81 milliGRAM(s) Oral daily  atorvastatin 40 milliGRAM(s) Oral at bedtime  azithromycin  IVPB      buMETAnide Infusion 2 mG/Hr (10 mL/Hr) IV Continuous <Continuous>  cefTRIAXone   IVPB      heparin   Injectable 5000 Unit(s) SubCutaneous once  heparin  Infusion.  Unit(s)/Hr (10 mL/Hr) IV Continuous <Continuous>  insulin glargine Injectable (LANTUS) 20 Unit(s) SubCutaneous at bedtime  insulin lispro (ADMELOG) corrective regimen sliding scale   SubCutaneous three times a day before meals  insulin lispro Injectable (ADMELOG) 5 Unit(s) SubCutaneous three times a day before meals  levothyroxine 200 MICROGram(s) Oral daily  lidocaine 1% Injectable 15 milliLiter(s) Local Injection once  metoprolol tartrate 25 milliGRAM(s) Oral daily  metoprolol tartrate 50 milliGRAM(s) Oral at bedtime  pantoprazole    Tablet 40 milliGRAM(s) Oral before breakfast  ranolazine 500 milliGRAM(s) Oral two times a day  sacubitril 49 mG/valsartan 51 mG 1 Tablet(s) Oral two times a day  tamsulosin 0.4 milliGRAM(s) Oral daily    MEDICATIONS  (PRN):  acetaminophen     Tablet .. 650 milliGRAM(s) Oral every 6 hours PRN Temp greater or equal to 38C (100.4F), Mild Pain (1 - 3)  heparin   Injectable 5000 Unit(s) SubCutaneous every 6 hours PRN For aPTT less than 40

## 2023-03-09 NOTE — PROGRESS NOTE ADULT - ASSESSMENT
77 year old M known HFrEF (EF 25% in 2021) s/p CRT-D, paroxysmal Afib on eliquis, CAD s/p CABG in 2016, Bullous pemphigoid, Hypertension, dyslipidemia Present to the ED for shortness of breath and chest pain and found to have decompensated Heart failure.    # Decompensated HFrEF   # suspected worsening LV dysfunction  # Right pleural effusion   - c/w bumex IV drip @2mg/hr  - target -150ml/hr  - Increased Entresto to 49/51   - c/w metoprolol  - monitor BMP  - strict I&Os  - BiPAP as needed  - Thoracentesis today: hold heparin   - Start empiric abx and follow up sputum culture   - F/u CXR  - TTE pending    # ACS/Unstable angina  - mildly elevated troponin but stable @ 0.02-0.3  - c/w aspirin  - c/w atorvastatin  - c/w ranolazine  - decision for C depends on risk vs benefit discussion  -   #Paroxysmal A.fib  - Eliquis stopped  - currently on heparin drip  - c/w metoprolol    #DM  - c/w insulin   - targer -180    #hypethyroid  - c/w levothyroxine  - f/u TSH    #dispo acute  #CODE status: DNI ( as per living will according to patient's wife)   77 year old M known HFrEF (EF 25% in 2021) s/p CRT-D, paroxysmal Afib on eliquis, CAD s/p CABG in 2016, Bullous pemphigoid, Hypertension, dyslipidemia Present to the ED for shortness of breath and chest pain and found to have decompensated Heart failure.    # Decompensated HFrEF   # suspected worsening LV dysfunction  # Right pleural effusion   - c/w bumex IV drip @2mg/hr  - target -150ml/hr  - Increased Entresto to 49/51   - Increase metoprolol to 50mg q8  - may need aldactone +/- Farxiga   - monitor BMP BID  - strict I&Os  - Thoracentesis today: hold heparin   - Start empiric abx and follow up sputum culture   - F/u CXR  - TTE pending    # Atypical chest pain   - mildly elevated troponin likely type II  - c/w aspirin  - c/w atorvastatin  - c/w metoprolol and ranolazine  - outpatient f/u for ischemic w/up  -   #Paroxysmal A.fib  - Eliquis stopped  - currently on heparin drip  - c/w metoprolol    #DM  - c/w insulin   - targer -180    #hypethyroid  - c/w levothyroxine  - f/u TSH    #dispo acute  #CODE status: DNI ( as per living will according to patient's wife)

## 2023-03-09 NOTE — CONSULT NOTE ADULT - ASSESSMENT
Impression:    CHF exacerbation  HO CHFrEF  Pulmonary edema  large rt pleural effusion  HO pAfib on AC  Ho CAD        Plan:    Continue diuresis per cardiology   Bedside US performed moderate to large rt pleural effusion with tachypnea  Rt thoracentesis performed  repeat CXR ordered  Pleural fluid sent for cell count chemistry and cytology  wean oxygen as tolerated  aspiration precaution  can resume heparin 4 am in morning tomorrow     Impression:    CHF exacerbation  HO CHFrEF  Pulmonary edema  large rt pleural effusion  HO pAfib on AC  Ho CAD        Plan:    Continue diuresis per cardiology   Bedside US performed moderate to large rt pleural effusion with tachypnea  Rt thoracentesis performed  Repeat CXR ordered and effusion resolved   Pleural fluid sent for cell count chemistry and cytology  Wean oxygen as tolerated  Aspiration precaution  Resume heparin today   Will follow as needed

## 2023-03-09 NOTE — PROGRESS NOTE ADULT - SUBJECTIVE AND OBJECTIVE BOX
SUBJECTIVE:    Patient is a 77y old Male who presents with a chief complaint of sob (07 Mar 2023 17:35)    Currently admitted to medicine with the primary diagnosis of Acute exacerbation of CHF (congestive heart failure)       Today is hospital day 2d. This morning he is resting comfortably in bed and reports no new issues or overnight events.     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     ALLERGIES:  contrast media (iodine-based) (Other)    MEDICATIONS:  STANDING MEDICATIONS  aspirin  chewable 81 milliGRAM(s) Oral daily  atorvastatin 40 milliGRAM(s) Oral at bedtime  buMETAnide Infusion 2 mG/Hr IV Continuous <Continuous>  heparin   Injectable 5000 Unit(s) SubCutaneous once  heparin  Infusion.  Unit(s)/Hr IV Continuous <Continuous>  insulin glargine Injectable (LANTUS) 20 Unit(s) SubCutaneous at bedtime  insulin lispro (ADMELOG) corrective regimen sliding scale   SubCutaneous three times a day before meals  insulin lispro Injectable (ADMELOG) 5 Unit(s) SubCutaneous three times a day before meals  levothyroxine 200 MICROGram(s) Oral daily  metoprolol tartrate 25 milliGRAM(s) Oral daily  metoprolol tartrate 50 milliGRAM(s) Oral at bedtime  pantoprazole    Tablet 40 milliGRAM(s) Oral before breakfast  potassium chloride   Powder 40 milliEquivalent(s) Oral every 4 hours  ranolazine 500 milliGRAM(s) Oral two times a day  sacubitril 24 mG/valsartan 26 mG 1 Tablet(s) Oral two times a day  tamsulosin 0.4 milliGRAM(s) Oral daily    PRN MEDICATIONS  acetaminophen     Tablet .. 650 milliGRAM(s) Oral every 6 hours PRN  heparin   Injectable 5000 Unit(s) SubCutaneous every 6 hours PRN    VITALS:   T(C): 36.5 (09 Mar 2023 07:30), Max: 36.8 (09 Mar 2023 04:01)  T(F): 97.7 (09 Mar 2023 07:30), Max: 98.2 (09 Mar 2023 04:01)  HR: 90 (09 Mar 2023 12:15) (75 - 112)  BP: 133/70 (09 Mar 2023 12:15) (113/58 - 167/77)  BP(mean): 88 (09 Mar 2023 07:30) (80 - 117)  RR: 22 (09 Mar 2023 12:15) (16 - 23)  SpO2: 92% (09 Mar 2023 12:15) (92% - 98%)    Parameters below as of 09 Mar 2023 12:15  Patient On (Oxygen Delivery Method): nasal cannula  O2 Flow (L/min): 3    LABS:                          12.7   13.67 )-----------( 300      ( 09 Mar 2023 06:33 )             40.3     03-09    145  |  104  |  31<H>  ----------------------------<  123<H>  3.8   |  31  |  1.4    Ca    8.9      09 Mar 2023 06:33  Phos  3.0     03-08  Mg     2.0     03-09    TPro  5.7<L>  /  Alb  3.0<L>  /  TBili  0.6  /  DBili  x   /  AST  25  /  ALT  17  /  AlkPhos  96  03-08    PT/INR - ( 08 Mar 2023 06:21 )   PT: 16.00 sec;   INR: 1.39 ratio         PTT - ( 09 Mar 2023 06:33 )  PTT:50.5 sec            RADIOLOGY:  CXR (3/8/23): Bilateral opacities/effusions, increasing on the right    PHYSICAL EXAM:  GEN: No acute distress  LUNGS: Bilat. crackles R>L decreased breath sounds over lower 3rd of rt chest  HEART: Irregular s1s2  ABD: Soft, non-tender, non-distended.  EXT: +1 LLE  NEURO: AAOX3    Intravenous access:   NG tube:   Orta Catheter:        SUBJECTIVE:    Patient is a 77y old Male who presents with a chief complaint of sob (07 Mar 2023 17:35)    Currently admitted to medicine with the primary diagnosis of Acute exacerbation of CHF (congestive heart failure)       Today is hospital day 2d. This morning he is resting comfortably in bed and reports no new issues or overnight events. Still has SOB and is complaining of new cough with sputum production.     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     ALLERGIES:  contrast media (iodine-based) (Other)    MEDICATIONS:  STANDING MEDICATIONS  aspirin  chewable 81 milliGRAM(s) Oral daily  atorvastatin 40 milliGRAM(s) Oral at bedtime  buMETAnide Infusion 2 mG/Hr IV Continuous <Continuous>  heparin   Injectable 5000 Unit(s) SubCutaneous once  heparin  Infusion.  Unit(s)/Hr IV Continuous <Continuous>  insulin glargine Injectable (LANTUS) 20 Unit(s) SubCutaneous at bedtime  insulin lispro (ADMELOG) corrective regimen sliding scale   SubCutaneous three times a day before meals  insulin lispro Injectable (ADMELOG) 5 Unit(s) SubCutaneous three times a day before meals  levothyroxine 200 MICROGram(s) Oral daily  metoprolol tartrate 25 milliGRAM(s) Oral daily  metoprolol tartrate 50 milliGRAM(s) Oral at bedtime  pantoprazole    Tablet 40 milliGRAM(s) Oral before breakfast  potassium chloride   Powder 40 milliEquivalent(s) Oral every 4 hours  ranolazine 500 milliGRAM(s) Oral two times a day  sacubitril 24 mG/valsartan 26 mG 1 Tablet(s) Oral two times a day  tamsulosin 0.4 milliGRAM(s) Oral daily    PRN MEDICATIONS  acetaminophen     Tablet .. 650 milliGRAM(s) Oral every 6 hours PRN  heparin   Injectable 5000 Unit(s) SubCutaneous every 6 hours PRN    VITALS:   T(C): 36.5 (09 Mar 2023 07:30), Max: 36.8 (09 Mar 2023 04:01)  T(F): 97.7 (09 Mar 2023 07:30), Max: 98.2 (09 Mar 2023 04:01)  HR: 90 (09 Mar 2023 12:15) (75 - 112)  BP: 133/70 (09 Mar 2023 12:15) (113/58 - 167/77)  BP(mean): 88 (09 Mar 2023 07:30) (80 - 117)  RR: 22 (09 Mar 2023 12:15) (16 - 23)  SpO2: 92% (09 Mar 2023 12:15) (92% - 98%)    Parameters below as of 09 Mar 2023 12:15  Patient On (Oxygen Delivery Method): nasal cannula  O2 Flow (L/min): 3    LABS:                          12.7   13.67 )-----------( 300      ( 09 Mar 2023 06:33 )             40.3     03-09    145  |  104  |  31<H>  ----------------------------<  123<H>  3.8   |  31  |  1.4    Ca    8.9      09 Mar 2023 06:33  Phos  3.0     03-08  Mg     2.0     03-09    TPro  5.7<L>  /  Alb  3.0<L>  /  TBili  0.6  /  DBili  x   /  AST  25  /  ALT  17  /  AlkPhos  96  03-08    PT/INR - ( 08 Mar 2023 06:21 )   PT: 16.00 sec;   INR: 1.39 ratio         PTT - ( 09 Mar 2023 06:33 )  PTT:50.5 sec            RADIOLOGY:  CXR (3/8/23): Bilateral opacities/effusions, increasing on the right    PHYSICAL EXAM:  GEN: No acute distress  LUNGS: Bilat. crackles R>L decreased breath sounds over lower 3rd of rt chest  HEART: Irregular s1s2  ABD: Soft, non-tender, non-distended.  EXT: +1 LLE  NEURO: AAOX3    Intravenous access:   NG tube:   Orta Catheter:

## 2023-03-09 NOTE — PROCEDURE NOTE - NSINFORMCONSENT_GEN_A_CORE
Benefits, risks, and possible complications of procedure explained to patient/caregiver who verbalized understanding and gave written consent. [Consult Letter:] : I had the pleasure of evaluating your patient, [unfilled]. [Please see my note below.] : Please see my note below. [Consult Closing:] : Thank you very much for allowing me to participate in the care of this patient.  If you have any questions, please do not hesitate to contact me. [Sincerely,] : Sincerely, [FreeTextEntry3] : Darrius Hartman MD

## 2023-03-10 LAB
ALBUMIN FLD-MCNC: 1.5 G/DL — SIGNIFICANT CHANGE UP
ALBUMIN SERPL ELPH-MCNC: 2.8 G/DL — LOW (ref 3.5–5.2)
ALP SERPL-CCNC: 123 U/L — HIGH (ref 30–115)
ALT FLD-CCNC: 16 U/L — SIGNIFICANT CHANGE UP (ref 0–41)
ANION GAP SERPL CALC-SCNC: 10 MMOL/L — SIGNIFICANT CHANGE UP (ref 7–14)
ANION GAP SERPL CALC-SCNC: 11 MMOL/L — SIGNIFICANT CHANGE UP (ref 7–14)
APTT BLD: 175.4 SEC — CRITICAL HIGH (ref 27–39.2)
APTT BLD: 40.1 SEC — HIGH (ref 27–39.2)
APTT BLD: 56.2 SEC — HIGH (ref 27–39.2)
AST SERPL-CCNC: 21 U/L — SIGNIFICANT CHANGE UP (ref 0–41)
BASOPHILS # BLD AUTO: 0.05 K/UL — SIGNIFICANT CHANGE UP (ref 0–0.2)
BASOPHILS NFR BLD AUTO: 0.4 % — SIGNIFICANT CHANGE UP (ref 0–1)
BILIRUB SERPL-MCNC: 0.4 MG/DL — SIGNIFICANT CHANGE UP (ref 0.2–1.2)
BUN SERPL-MCNC: 34 MG/DL — HIGH (ref 10–20)
BUN SERPL-MCNC: 35 MG/DL — HIGH (ref 10–20)
CALCIUM SERPL-MCNC: 8.3 MG/DL — LOW (ref 8.4–10.5)
CALCIUM SERPL-MCNC: 8.9 MG/DL — SIGNIFICANT CHANGE UP (ref 8.4–10.5)
CHLORIDE SERPL-SCNC: 100 MMOL/L — SIGNIFICANT CHANGE UP (ref 98–110)
CHLORIDE SERPL-SCNC: 102 MMOL/L — SIGNIFICANT CHANGE UP (ref 98–110)
CO2 SERPL-SCNC: 29 MMOL/L — SIGNIFICANT CHANGE UP (ref 17–32)
CO2 SERPL-SCNC: 32 MMOL/L — SIGNIFICANT CHANGE UP (ref 17–32)
CREAT SERPL-MCNC: 1.5 MG/DL — SIGNIFICANT CHANGE UP (ref 0.7–1.5)
CREAT SERPL-MCNC: 1.5 MG/DL — SIGNIFICANT CHANGE UP (ref 0.7–1.5)
EGFR: 48 ML/MIN/1.73M2 — LOW
EGFR: 48 ML/MIN/1.73M2 — LOW
EOSINOPHIL # BLD AUTO: 0.58 K/UL — SIGNIFICANT CHANGE UP (ref 0–0.7)
EOSINOPHIL NFR BLD AUTO: 4.3 % — SIGNIFICANT CHANGE UP (ref 0–8)
GLUCOSE BLDC GLUCOMTR-MCNC: 112 MG/DL — HIGH (ref 70–99)
GLUCOSE BLDC GLUCOMTR-MCNC: 139 MG/DL — HIGH (ref 70–99)
GLUCOSE BLDC GLUCOMTR-MCNC: 141 MG/DL — HIGH (ref 70–99)
GLUCOSE BLDC GLUCOMTR-MCNC: 150 MG/DL — HIGH (ref 70–99)
GLUCOSE BLDC GLUCOMTR-MCNC: 74 MG/DL — SIGNIFICANT CHANGE UP (ref 70–99)
GLUCOSE FLD-MCNC: 107 MG/DL — SIGNIFICANT CHANGE UP
GLUCOSE SERPL-MCNC: 118 MG/DL — HIGH (ref 70–99)
GLUCOSE SERPL-MCNC: 236 MG/DL — HIGH (ref 70–99)
GRAM STN FLD: SIGNIFICANT CHANGE UP
HCT VFR BLD CALC: 39.3 % — LOW (ref 42–52)
HCT VFR BLD CALC: 41.5 % — LOW (ref 42–52)
HGB BLD-MCNC: 12.6 G/DL — LOW (ref 14–18)
HGB BLD-MCNC: 12.9 G/DL — LOW (ref 14–18)
IMM GRANULOCYTES NFR BLD AUTO: 0.7 % — HIGH (ref 0.1–0.3)
INR BLD: 1.28 RATIO — SIGNIFICANT CHANGE UP (ref 0.65–1.3)
LDH SERPL L TO P-CCNC: 204 U/L — SIGNIFICANT CHANGE UP
LYMPHOCYTES # BLD AUTO: 15.5 % — LOW (ref 20.5–51.1)
LYMPHOCYTES # BLD AUTO: 2.08 K/UL — SIGNIFICANT CHANGE UP (ref 1.2–3.4)
MAGNESIUM SERPL-MCNC: 1.9 MG/DL — SIGNIFICANT CHANGE UP (ref 1.8–2.4)
MCHC RBC-ENTMCNC: 26.7 PG — LOW (ref 27–31)
MCHC RBC-ENTMCNC: 27.2 PG — SIGNIFICANT CHANGE UP (ref 27–31)
MCHC RBC-ENTMCNC: 31.1 G/DL — LOW (ref 32–37)
MCHC RBC-ENTMCNC: 32.1 G/DL — SIGNIFICANT CHANGE UP (ref 32–37)
MCV RBC AUTO: 84.7 FL — SIGNIFICANT CHANGE UP (ref 80–94)
MCV RBC AUTO: 85.7 FL — SIGNIFICANT CHANGE UP (ref 80–94)
MONOCYTES # BLD AUTO: 0.92 K/UL — HIGH (ref 0.1–0.6)
MONOCYTES NFR BLD AUTO: 6.9 % — SIGNIFICANT CHANGE UP (ref 1.7–9.3)
NEUTROPHILS # BLD AUTO: 9.71 K/UL — HIGH (ref 1.4–6.5)
NEUTROPHILS NFR BLD AUTO: 72.2 % — SIGNIFICANT CHANGE UP (ref 42.2–75.2)
NRBC # BLD: 0 /100 WBCS — SIGNIFICANT CHANGE UP (ref 0–0)
NRBC # BLD: 0 /100 WBCS — SIGNIFICANT CHANGE UP (ref 0–0)
PH FLD: 7.7 — SIGNIFICANT CHANGE UP
PLATELET # BLD AUTO: 324 K/UL — SIGNIFICANT CHANGE UP (ref 130–400)
PLATELET # BLD AUTO: 350 K/UL — SIGNIFICANT CHANGE UP (ref 130–400)
POTASSIUM SERPL-MCNC: 3.5 MMOL/L — SIGNIFICANT CHANGE UP (ref 3.5–5)
POTASSIUM SERPL-MCNC: 3.7 MMOL/L — SIGNIFICANT CHANGE UP (ref 3.5–5)
POTASSIUM SERPL-SCNC: 3.5 MMOL/L — SIGNIFICANT CHANGE UP (ref 3.5–5)
POTASSIUM SERPL-SCNC: 3.7 MMOL/L — SIGNIFICANT CHANGE UP (ref 3.5–5)
PROT FLD-MCNC: 2.6 G/DL — SIGNIFICANT CHANGE UP
PROT SERPL-MCNC: 5.4 G/DL — LOW (ref 6–8)
PROTHROM AB SERPL-ACNC: 14.7 SEC — HIGH (ref 9.95–12.87)
RBC # BLD: 4.64 M/UL — LOW (ref 4.7–6.1)
RBC # BLD: 4.84 M/UL — SIGNIFICANT CHANGE UP (ref 4.7–6.1)
RBC # FLD: 14.7 % — HIGH (ref 11.5–14.5)
RBC # FLD: 14.7 % — HIGH (ref 11.5–14.5)
SODIUM SERPL-SCNC: 142 MMOL/L — SIGNIFICANT CHANGE UP (ref 135–146)
SODIUM SERPL-SCNC: 142 MMOL/L — SIGNIFICANT CHANGE UP (ref 135–146)
SPECIMEN SOURCE: SIGNIFICANT CHANGE UP
URATE SERPL-MCNC: 10.2 MG/DL — HIGH (ref 3.4–8.8)
WBC # BLD: 12.65 K/UL — HIGH (ref 4.8–10.8)
WBC # BLD: 13.43 K/UL — HIGH (ref 4.8–10.8)
WBC # FLD AUTO: 12.65 K/UL — HIGH (ref 4.8–10.8)
WBC # FLD AUTO: 13.43 K/UL — HIGH (ref 4.8–10.8)

## 2023-03-10 PROCEDURE — 99232 SBSQ HOSP IP/OBS MODERATE 35: CPT

## 2023-03-10 PROCEDURE — 71045 X-RAY EXAM CHEST 1 VIEW: CPT | Mod: 26

## 2023-03-10 PROCEDURE — 99233 SBSQ HOSP IP/OBS HIGH 50: CPT | Mod: 25

## 2023-03-10 RX ORDER — ALBUTEROL 90 UG/1
2.5 AEROSOL, METERED ORAL EVERY 6 HOURS
Refills: 0 | Status: DISCONTINUED | OUTPATIENT
Start: 2023-03-10 | End: 2023-03-17

## 2023-03-10 RX ORDER — HEPARIN SODIUM 5000 [USP'U]/ML
INJECTION INTRAVENOUS; SUBCUTANEOUS
Qty: 25000 | Refills: 0 | Status: DISCONTINUED | OUTPATIENT
Start: 2023-03-10 | End: 2023-03-14

## 2023-03-10 RX ORDER — HEPARIN SODIUM 5000 [USP'U]/ML
3000 INJECTION INTRAVENOUS; SUBCUTANEOUS EVERY 6 HOURS
Refills: 0 | Status: DISCONTINUED | OUTPATIENT
Start: 2023-03-10 | End: 2023-03-14

## 2023-03-10 RX ORDER — SODIUM CHLORIDE 5 G/100ML
150 INJECTION, SOLUTION INTRAVENOUS
Refills: 0 | Status: DISCONTINUED | OUTPATIENT
Start: 2023-03-10 | End: 2023-03-13

## 2023-03-10 RX ORDER — CHLORHEXIDINE GLUCONATE 213 G/1000ML
1 SOLUTION TOPICAL DAILY
Refills: 0 | Status: DISCONTINUED | OUTPATIENT
Start: 2023-03-10 | End: 2023-03-17

## 2023-03-10 RX ORDER — SODIUM CHLORIDE 5 G/100ML
150 INJECTION, SOLUTION INTRAVENOUS
Refills: 0 | Status: COMPLETED | OUTPATIENT
Start: 2023-03-10 | End: 2023-03-10

## 2023-03-10 RX ORDER — HEPARIN SODIUM 5000 [USP'U]/ML
6500 INJECTION INTRAVENOUS; SUBCUTANEOUS EVERY 6 HOURS
Refills: 0 | Status: DISCONTINUED | OUTPATIENT
Start: 2023-03-10 | End: 2023-03-14

## 2023-03-10 RX ORDER — HEPARIN SODIUM 5000 [USP'U]/ML
6500 INJECTION INTRAVENOUS; SUBCUTANEOUS ONCE
Refills: 0 | Status: COMPLETED | OUTPATIENT
Start: 2023-03-10 | End: 2023-03-10

## 2023-03-10 RX ADMIN — INSULIN GLARGINE 20 UNIT(S): 100 INJECTION, SOLUTION SUBCUTANEOUS at 21:16

## 2023-03-10 RX ADMIN — PANTOPRAZOLE SODIUM 40 MILLIGRAM(S): 20 TABLET, DELAYED RELEASE ORAL at 05:16

## 2023-03-10 RX ADMIN — Medication 5 UNIT(S): at 12:03

## 2023-03-10 RX ADMIN — HEPARIN SODIUM 1500 UNIT(S)/HR: 5000 INJECTION INTRAVENOUS; SUBCUTANEOUS at 04:21

## 2023-03-10 RX ADMIN — SODIUM CHLORIDE 50 MILLILITER(S): 5 INJECTION, SOLUTION INTRAVENOUS at 10:58

## 2023-03-10 RX ADMIN — HEPARIN SODIUM 3000 UNIT(S): 5000 INJECTION INTRAVENOUS; SUBCUTANEOUS at 20:45

## 2023-03-10 RX ADMIN — CEFTRIAXONE 100 MILLIGRAM(S): 500 INJECTION, POWDER, FOR SOLUTION INTRAMUSCULAR; INTRAVENOUS at 09:10

## 2023-03-10 RX ADMIN — SACUBITRIL AND VALSARTAN 1 TABLET(S): 24; 26 TABLET, FILM COATED ORAL at 18:04

## 2023-03-10 RX ADMIN — Medication 200 MICROGRAM(S): at 05:16

## 2023-03-10 RX ADMIN — TAMSULOSIN HYDROCHLORIDE 0.4 MILLIGRAM(S): 0.4 CAPSULE ORAL at 12:06

## 2023-03-10 RX ADMIN — Medication 5 UNIT(S): at 08:38

## 2023-03-10 RX ADMIN — HEPARIN SODIUM 200 UNIT(S)/HR: 5000 INJECTION INTRAVENOUS; SUBCUTANEOUS at 20:33

## 2023-03-10 RX ADMIN — SACUBITRIL AND VALSARTAN 1 TABLET(S): 24; 26 TABLET, FILM COATED ORAL at 05:16

## 2023-03-10 RX ADMIN — Medication 50 MILLIGRAM(S): at 21:16

## 2023-03-10 RX ADMIN — HEPARIN SODIUM 0 UNIT(S)/HR: 5000 INJECTION INTRAVENOUS; SUBCUTANEOUS at 12:01

## 2023-03-10 RX ADMIN — AZITHROMYCIN 255 MILLIGRAM(S): 500 TABLET, FILM COATED ORAL at 09:10

## 2023-03-10 RX ADMIN — SODIUM CHLORIDE 50 MILLILITER(S): 5 INJECTION, SOLUTION INTRAVENOUS at 18:05

## 2023-03-10 RX ADMIN — ATORVASTATIN CALCIUM 40 MILLIGRAM(S): 80 TABLET, FILM COATED ORAL at 21:16

## 2023-03-10 RX ADMIN — RANOLAZINE 500 MILLIGRAM(S): 500 TABLET, FILM COATED, EXTENDED RELEASE ORAL at 18:04

## 2023-03-10 RX ADMIN — HEPARIN SODIUM 6500 UNIT(S): 5000 INJECTION INTRAVENOUS; SUBCUTANEOUS at 04:21

## 2023-03-10 RX ADMIN — BUMETANIDE 10 MG/HR: 0.25 INJECTION INTRAMUSCULAR; INTRAVENOUS at 05:18

## 2023-03-10 RX ADMIN — Medication 81 MILLIGRAM(S): at 12:04

## 2023-03-10 RX ADMIN — Medication 25 MILLIGRAM(S): at 05:16

## 2023-03-10 RX ADMIN — RANOLAZINE 500 MILLIGRAM(S): 500 TABLET, FILM COATED, EXTENDED RELEASE ORAL at 05:16

## 2023-03-10 NOTE — PROGRESS NOTE ADULT - ASSESSMENT
77 year old M known HFrEF (EF 25% in 2021) s/p CRT-D, paroxysmal Afib on eliquis, CAD s/p CABG in 2016, Bullous pemphigoid, Hypertension, dyslipidemia Present to the ED for shortness of breath and chest pain and found to have decompensated Heart failure.    # Decompensated HFrEF   # suspected worsening LV dysfunction  # Right pleural effusion   - c/w bumex IV drip @2mg/hr  - target -150ml/hr  - c/w Entresto 49/51   - c/w metoprolol 50mg q8  - Give hypertonic saline x 2  - Metolazone 5mg x 1  - may need aldactone +/- Farxiga   - monitor BMP BID  - strict I&Os  - Thoracentesis yesterday 1L fluid removed. Heparin restarted at 4am 3/10   - c/w abx and follow up sputum culture   - F/u uric acid level   - Echo : GIID, 16% EF     # Atypical chest pain   - mildly elevated troponin likely type II  - c/w aspirin  - c/w atorvastatin  - c/w metoprolol and ranolazine  - outpatient f/u for ischemic w/up  -   #Paroxysmal A.fib  - Eliquis stopped  - currently on heparin drip  - c/w metoprolol    #DM  - c/w insulin   - targer -180    #hypethyroid  - c/w levothyroxine  - f/u TSH    #dispo acute  #CODE status: DNI ( as per living will according to patient's wife)   77 year old M known HFrEF (EF 25% in 2021) s/p CRT-D, paroxysmal Afib on eliquis, CAD s/p CABG in 2016, Bullous pemphigoid, Hypertension, dyslipidemia Present to the ED for shortness of breath and chest pain and found to have decompensated Heart failure.    # Decompensated HFrEF   # suspected worsening LV dysfunction  # Right pleural effusion   - c/w bumex IV drip @2mg/hr  - target -150ml/hr  - c/w Entresto 49/51   - c/w metoprolol 50mg q8  - Give hypertonic saline x 2  - Metolazone 5mg x 1  - may need aldactone +/- Farxiga   - monitor BMP BID  - strict I&Os  - Thoracentesis yesterday 1L fluid removed. Heparin restarted at 4am 3/10   - c/w abx and follow up sputum culture   - F/u uric acid level   - Echo : GIID, 16% EF     # Atypical chest pain   - mildly elevated troponin likely type II  - c/w aspirin  - c/w atorvastatin  - c/w metoprolol and ranolazine  - outpatient f/u for ischemic w/up    #Paroxysmal A.fib  - Eliquis stopped  - currently on heparin drip  - c/w metoprolol    #DM  - c/w insulin   - targer -180    #hypethyroid  - c/w levothyroxine  - f/u TSH    #dispo acute  #CODE status: DNI ( as per living will according to patient's wife)

## 2023-03-10 NOTE — PROGRESS NOTE ADULT - SUBJECTIVE AND OBJECTIVE BOX
SUBJECTIVE:    Patient is a 77y old Male who presents with a chief complaint of sob (07 Mar 2023 17:35)    Currently admitted to medicine with the primary diagnosis of Acute exacerbation of CHF (congestive heart failure)       Today is hospital day 3d. This morning he is resting comfortably in bed and reports no new issues or overnight events. 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     ALLERGIES:  contrast media (iodine-based) (Other)    MEDICATIONS:  STANDING MEDICATIONS  aspirin  chewable 81 milliGRAM(s) Oral daily  atorvastatin 40 milliGRAM(s) Oral at bedtime  buMETAnide Infusion 2 mG/Hr IV Continuous <Continuous>  heparin   Injectable 5000 Unit(s) SubCutaneous once  heparin  Infusion.  Unit(s)/Hr IV Continuous <Continuous>  insulin glargine Injectable (LANTUS) 20 Unit(s) SubCutaneous at bedtime  insulin lispro (ADMELOG) corrective regimen sliding scale   SubCutaneous three times a day before meals  insulin lispro Injectable (ADMELOG) 5 Unit(s) SubCutaneous three times a day before meals  levothyroxine 200 MICROGram(s) Oral daily  metoprolol tartrate 25 milliGRAM(s) Oral daily  metoprolol tartrate 50 milliGRAM(s) Oral at bedtime  pantoprazole    Tablet 40 milliGRAM(s) Oral before breakfast  potassium chloride   Powder 40 milliEquivalent(s) Oral every 4 hours  ranolazine 500 milliGRAM(s) Oral two times a day  sacubitril 24 mG/valsartan 26 mG 1 Tablet(s) Oral two times a day  tamsulosin 0.4 milliGRAM(s) Oral daily    PRN MEDICATIONS  acetaminophen     Tablet .. 650 milliGRAM(s) Oral every 6 hours PRN  heparin   Injectable 5000 Unit(s) SubCutaneous every 6 hours PRN    VITALS:   T(C): 36.4 (10 Mar 2023 07:43), Max: 37.1 (09 Mar 2023 16:57)  T(F): 97.5 (10 Mar 2023 07:43), Max: 98.8 (09 Mar 2023 16:57)  HR: 84 (10 Mar 2023 13:05) (80 - 100)  BP: 133/63 (10 Mar 2023 13:05) (108/60 - 144/79)  BP(mean): 90 (10 Mar 2023 13:05) (77 - 105)  RR: 20 (10 Mar 2023 13:05) (20 - 25)  SpO2: 95% (10 Mar 2023 13:05) (93% - 98%)    Parameters below as of 10 Mar 2023 13:05  Patient On (Oxygen Delivery Method): room air        LABS:               12.6   12.65 )-----------( 324      ( 10 Mar 2023 10:51 )             39.3     03-10    142  |  100  |  35<H>  ----------------------------<  236<H>  3.5   |  32  |  1.5    Ca    8.3<L>      10 Mar 2023 10:51  Mg     1.9     03-10    TPro  5.4<L>  /  Alb  2.8<L>  /  TBili  0.4  /  DBili  x   /  AST  21  /  ALT  16  /  AlkPhos  123<H>  03-10    PTT - ( 10 Mar 2023 10:51 )  PTT:175.4 sec        RADIOLOGY:  CXR (3/10/23): stable opacities    PHYSICAL EXAM:  GEN: no distress  HEENT: LAURO             Lymphatic system: No cervical LN   Lungs: Bilateral BS, wheezing on expiration noted   Cardiovascular: Regular   Gastrointestinal: Soft, Positive BS  Extremities: No clubbing.  Moves extremities.  Full Range of motion   Skin: Warm, intact  Neurological: No motor or sensory deficit            SUBJECTIVE:    Patient is a 77y old Male who presents with a chief complaint of sob (07 Mar 2023 17:35)    Currently admitted to medicine with the primary diagnosis of Acute exacerbation of CHF (congestive heart failure)       Today is hospital day 3d. This morning he is resting comfortably in bed and reports no new issues or overnight events. He is s/p thoracentesis.    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     ALLERGIES:  contrast media (iodine-based) (Other)    MEDICATIONS:  STANDING MEDICATIONS  aspirin  chewable 81 milliGRAM(s) Oral daily  atorvastatin 40 milliGRAM(s) Oral at bedtime  buMETAnide Infusion 2 mG/Hr IV Continuous <Continuous>  heparin   Injectable 5000 Unit(s) SubCutaneous once  heparin  Infusion.  Unit(s)/Hr IV Continuous <Continuous>  insulin glargine Injectable (LANTUS) 20 Unit(s) SubCutaneous at bedtime  insulin lispro (ADMELOG) corrective regimen sliding scale   SubCutaneous three times a day before meals  insulin lispro Injectable (ADMELOG) 5 Unit(s) SubCutaneous three times a day before meals  levothyroxine 200 MICROGram(s) Oral daily  metoprolol tartrate 25 milliGRAM(s) Oral daily  metoprolol tartrate 50 milliGRAM(s) Oral at bedtime  pantoprazole    Tablet 40 milliGRAM(s) Oral before breakfast  potassium chloride   Powder 40 milliEquivalent(s) Oral every 4 hours  ranolazine 500 milliGRAM(s) Oral two times a day  sacubitril 24 mG/valsartan 26 mG 1 Tablet(s) Oral two times a day  tamsulosin 0.4 milliGRAM(s) Oral daily    PRN MEDICATIONS  acetaminophen     Tablet .. 650 milliGRAM(s) Oral every 6 hours PRN  heparin   Injectable 5000 Unit(s) SubCutaneous every 6 hours PRN    VITALS:   T(C): 36.4 (10 Mar 2023 07:43), Max: 37.1 (09 Mar 2023 16:57)  T(F): 97.5 (10 Mar 2023 07:43), Max: 98.8 (09 Mar 2023 16:57)  HR: 84 (10 Mar 2023 13:05) (80 - 100)  BP: 133/63 (10 Mar 2023 13:05) (108/60 - 144/79)  BP(mean): 90 (10 Mar 2023 13:05) (77 - 105)  RR: 20 (10 Mar 2023 13:05) (20 - 25)  SpO2: 95% (10 Mar 2023 13:05) (93% - 98%)    Parameters below as of 10 Mar 2023 13:05  Patient On (Oxygen Delivery Method): room air        LABS:               12.6   12.65 )-----------( 324      ( 10 Mar 2023 10:51 )             39.3     03-10    142  |  100  |  35<H>  ----------------------------<  236<H>  3.5   |  32  |  1.5    Ca    8.3<L>      10 Mar 2023 10:51  Mg     1.9     03-10    TPro  5.4<L>  /  Alb  2.8<L>  /  TBili  0.4  /  DBili  x   /  AST  21  /  ALT  16  /  AlkPhos  123<H>  03-10    PTT - ( 10 Mar 2023 10:51 )  PTT:175.4 sec        RADIOLOGY:  CXR (3/10/23): stable opacities    PHYSICAL EXAM:  GEN: no distress  HEENT: LAURO             Lymphatic system: No cervical LN   Lungs: Bilateral BS, wheezing on expiration noted   Cardiovascular: Regular   Gastrointestinal: Soft, Positive BS  Extremities: No clubbing.  Moves extremities.  Full Range of motion   Skin: Warm, intact  Neurological: No motor or sensory deficit

## 2023-03-10 NOTE — PROGRESS NOTE ADULT - SUBJECTIVE AND OBJECTIVE BOX
Patient is a 77y old  Male who presents with a chief complaint of sob (09 Mar 2023 16:28)        Over Night Events:    Feels better   On 3 LPM   No fevers   Shortness of breath improved after the thora        ROS:  See HPI    PHYSICAL EXAM    ICU Vital Signs Last 24 Hrs  T(C): 36.4 (10 Mar 2023 07:43), Max: 37.1 (09 Mar 2023 16:57)  T(F): 97.5 (10 Mar 2023 07:43), Max: 98.8 (09 Mar 2023 16:57)  HR: 80 (10 Mar 2023 07:43) (80 - 100)  BP: 135/74 (10 Mar 2023 07:43) (108/60 - 144/79)  BP(mean): 99 (10 Mar 2023 07:43) (77 - 105)  ABP: --  ABP(mean): --  RR: 38 (10 Mar 2023 07:43) (20 - 38)  SpO2: 96% (10 Mar 2023 07:43) (92% - 98%)    O2 Parameters below as of 10 Mar 2023 00:11  Patient On (Oxygen Delivery Method): nasal cannula  O2 Flow (L/min): 3          General:  HEENT: LAURO             Lymphatic system: No cervical LN   Lungs: Bilateral BS, wheezing on expiration noted   Cardiovascular: Regular   Gastrointestinal: Soft, Positive BS  Extremities: No clubbing.  Moves extremities.  Full Range of motion   Skin: Warm, intact  Neurological: No motor or sensory deficit       03-09-23 @ 07:01  -  03-10-23 @ 07:00  --------------------------------------------------------  IN:    Bumetanide: 220 mL    Heparin Infusion: 34.5 mL    Heparin Infusion: 45 mL    Oral Fluid: 670 mL  Total IN: 969.5 mL    OUT:    Voided (mL): 2150 mL  Total OUT: 2150 mL    Total NET: -1180.5 mL      03-10-23 @ 07:01  -  03-10-23 @ 08:23  --------------------------------------------------------  IN:  Total IN: 0 mL    OUT:    Voided (mL): 150 mL  Total OUT: 150 mL    Total NET: -150 mL          LABS:                            12.9   13.43 )-----------( 350      ( 10 Mar 2023 06:07 )             41.5                                               03-09    143  |  100  |  33<H>  ----------------------------<  132<H>  4.0   |  28  |  1.5    Ca    9.1      09 Mar 2023 20:41  Mg     1.8     03-09        PTT - ( 09 Mar 2023 23:56 )  PTT:40.1 sec                                           CARDIAC MARKERS ( 09 Mar 2023 20:41 )  x     / 0.03 ng/mL / x     / x     / 1.3 ng/mL                                                                                     Culture - Body Fluid with Gram Stain (collected 09 Mar 2023 16:54)  Source: Pleural Fl Pleural Fluid  Gram Stain (10 Mar 2023 02:05):    polymorphonuclear leukocytes seen    No organisms seen    by cytocentrifuge                                                                                       ABG - ( 09 Mar 2023 21:09 )  pH, Arterial: 7.45  pH, Blood: x     /  pCO2: 48    /  pO2: 78    / HCO3: 33    / Base Excess: 8.1   /  SaO2: 97.3                MEDICATIONS  (STANDING):  albuterol/ipratropium for Nebulization. 3 milliLiter(s) Nebulizer once  aspirin  chewable 81 milliGRAM(s) Oral daily  atorvastatin 40 milliGRAM(s) Oral at bedtime  azithromycin  IVPB 500 milliGRAM(s) IV Intermittent every 24 hours  azithromycin  IVPB      buMETAnide Infusion 2 mG/Hr (10 mL/Hr) IV Continuous <Continuous>  cefTRIAXone   IVPB 1000 milliGRAM(s) IV Intermittent every 24 hours  cefTRIAXone   IVPB      heparin  Infusion.  Unit(s)/Hr (15 mL/Hr) IV Continuous <Continuous>  insulin glargine Injectable (LANTUS) 20 Unit(s) SubCutaneous at bedtime  insulin lispro (ADMELOG) corrective regimen sliding scale   SubCutaneous three times a day before meals  insulin lispro Injectable (ADMELOG) 5 Unit(s) SubCutaneous three times a day before meals  levothyroxine 200 MICROGram(s) Oral daily  metoprolol tartrate 25 milliGRAM(s) Oral daily  metoprolol tartrate 50 milliGRAM(s) Oral at bedtime  pantoprazole    Tablet 40 milliGRAM(s) Oral before breakfast  ranolazine 500 milliGRAM(s) Oral two times a day  sacubitril 49 mG/valsartan 51 mG 1 Tablet(s) Oral two times a day  tamsulosin 0.4 milliGRAM(s) Oral daily    MEDICATIONS  (PRN):  acetaminophen     Tablet .. 650 milliGRAM(s) Oral every 6 hours PRN Temp greater or equal to 38C (100.4F), Mild Pain (1 - 3)  heparin   Injectable 6500 Unit(s) IV Push every 6 hours PRN For aPTT less than 40  heparin   Injectable 3000 Unit(s) IV Push every 6 hours PRN For aPTT between 40 - 57      Xrays:                                                                                     ECHO

## 2023-03-10 NOTE — PROGRESS NOTE ADULT - ASSESSMENT
Impression:    CHF exacerbation  HO CHFrEF  Pulmonary edema  large rt pleural effusion  HO pAfib on AC  Ho CAD  Former smoker of 20 PY       Plan:    Stable infiltrates; effusion resolved on CXR   Continue diuresis per cardiology   Pleural fluid transudate by protein ration; check serum LDH; follow up culture and cyto results   Wean oxygen as tolerated  Recommend nebulized albuterol PRN for the wheezing   He did smoke for 20 years in the past; could have obstructive airway disease; consider spiriva daily   Aspiration precaution  Continue therapeutic anticoagulation and keep PTT therapeutic   Will follow as needed

## 2023-03-11 LAB
ANION GAP SERPL CALC-SCNC: 12 MMOL/L — SIGNIFICANT CHANGE UP (ref 7–14)
APTT BLD: 39.9 SEC — HIGH (ref 27–39.2)
APTT BLD: 41.1 SEC — HIGH (ref 27–39.2)
APTT BLD: 45.2 SEC — HIGH (ref 27–39.2)
BASOPHILS # BLD AUTO: 0.09 K/UL — SIGNIFICANT CHANGE UP (ref 0–0.2)
BASOPHILS NFR BLD AUTO: 0.6 % — SIGNIFICANT CHANGE UP (ref 0–1)
BUN SERPL-MCNC: 41 MG/DL — HIGH (ref 10–20)
CALCIUM SERPL-MCNC: 9.2 MG/DL — SIGNIFICANT CHANGE UP (ref 8.4–10.4)
CHLORIDE SERPL-SCNC: 99 MMOL/L — SIGNIFICANT CHANGE UP (ref 98–110)
CO2 SERPL-SCNC: 33 MMOL/L — HIGH (ref 17–32)
CREAT SERPL-MCNC: 1.9 MG/DL — HIGH (ref 0.7–1.5)
EGFR: 36 ML/MIN/1.73M2 — LOW
EOSINOPHIL # BLD AUTO: 0.74 K/UL — HIGH (ref 0–0.7)
EOSINOPHIL NFR BLD AUTO: 4.9 % — SIGNIFICANT CHANGE UP (ref 0–8)
GLUCOSE BLDC GLUCOMTR-MCNC: 103 MG/DL — HIGH (ref 70–99)
GLUCOSE BLDC GLUCOMTR-MCNC: 138 MG/DL — HIGH (ref 70–99)
GLUCOSE BLDC GLUCOMTR-MCNC: 138 MG/DL — HIGH (ref 70–99)
GLUCOSE BLDC GLUCOMTR-MCNC: 209 MG/DL — HIGH (ref 70–99)
GLUCOSE SERPL-MCNC: 103 MG/DL — HIGH (ref 70–99)
HCT VFR BLD CALC: 42.4 % — SIGNIFICANT CHANGE UP (ref 42–52)
HGB BLD-MCNC: 13 G/DL — LOW (ref 14–18)
IMM GRANULOCYTES NFR BLD AUTO: 0.9 % — HIGH (ref 0.1–0.3)
LYMPHOCYTES # BLD AUTO: 17.7 % — LOW (ref 20.5–51.1)
LYMPHOCYTES # BLD AUTO: 2.69 K/UL — SIGNIFICANT CHANGE UP (ref 1.2–3.4)
MAGNESIUM SERPL-MCNC: 1.8 MG/DL — SIGNIFICANT CHANGE UP (ref 1.8–2.4)
MCHC RBC-ENTMCNC: 26.6 PG — LOW (ref 27–31)
MCHC RBC-ENTMCNC: 30.7 G/DL — LOW (ref 32–37)
MCV RBC AUTO: 86.7 FL — SIGNIFICANT CHANGE UP (ref 80–94)
MONOCYTES # BLD AUTO: 1.25 K/UL — HIGH (ref 0.1–0.6)
MONOCYTES NFR BLD AUTO: 8.2 % — SIGNIFICANT CHANGE UP (ref 1.7–9.3)
NEUTROPHILS # BLD AUTO: 10.33 K/UL — HIGH (ref 1.4–6.5)
NEUTROPHILS NFR BLD AUTO: 67.7 % — SIGNIFICANT CHANGE UP (ref 42.2–75.2)
NRBC # BLD: 0 /100 WBCS — SIGNIFICANT CHANGE UP (ref 0–0)
PLATELET # BLD AUTO: 359 K/UL — SIGNIFICANT CHANGE UP (ref 130–400)
POTASSIUM SERPL-MCNC: 3.8 MMOL/L — SIGNIFICANT CHANGE UP (ref 3.5–5)
POTASSIUM SERPL-SCNC: 3.8 MMOL/L — SIGNIFICANT CHANGE UP (ref 3.5–5)
RBC # BLD: 4.89 M/UL — SIGNIFICANT CHANGE UP (ref 4.7–6.1)
RBC # FLD: 14.6 % — HIGH (ref 11.5–14.5)
SODIUM SERPL-SCNC: 144 MMOL/L — SIGNIFICANT CHANGE UP (ref 135–146)
WBC # BLD: 15.23 K/UL — HIGH (ref 4.8–10.8)
WBC # FLD AUTO: 15.23 K/UL — HIGH (ref 4.8–10.8)

## 2023-03-11 PROCEDURE — 99291 CRITICAL CARE FIRST HOUR: CPT

## 2023-03-11 RX ORDER — SODIUM CHLORIDE 5 G/100ML
150 INJECTION, SOLUTION INTRAVENOUS
Refills: 0 | Status: DISCONTINUED | OUTPATIENT
Start: 2023-03-11 | End: 2023-03-13

## 2023-03-11 RX ADMIN — HEPARIN SODIUM 800 UNIT(S)/HR: 5000 INJECTION INTRAVENOUS; SUBCUTANEOUS at 17:48

## 2023-03-11 RX ADMIN — BUMETANIDE 10 MG/HR: 0.25 INJECTION INTRAMUSCULAR; INTRAVENOUS at 05:19

## 2023-03-11 RX ADMIN — HEPARIN SODIUM 600 UNIT(S)/HR: 5000 INJECTION INTRAVENOUS; SUBCUTANEOUS at 10:32

## 2023-03-11 RX ADMIN — Medication 200 MICROGRAM(S): at 05:12

## 2023-03-11 RX ADMIN — Medication 81 MILLIGRAM(S): at 11:13

## 2023-03-11 RX ADMIN — HEPARIN SODIUM 3000 UNIT(S): 5000 INJECTION INTRAVENOUS; SUBCUTANEOUS at 04:09

## 2023-03-11 RX ADMIN — TAMSULOSIN HYDROCHLORIDE 0.4 MILLIGRAM(S): 0.4 CAPSULE ORAL at 11:13

## 2023-03-11 RX ADMIN — Medication 50 MILLIGRAM(S): at 21:59

## 2023-03-11 RX ADMIN — ATORVASTATIN CALCIUM 40 MILLIGRAM(S): 80 TABLET, FILM COATED ORAL at 21:59

## 2023-03-11 RX ADMIN — RANOLAZINE 500 MILLIGRAM(S): 500 TABLET, FILM COATED, EXTENDED RELEASE ORAL at 05:12

## 2023-03-11 RX ADMIN — SODIUM CHLORIDE 50 MILLILITER(S): 5 INJECTION, SOLUTION INTRAVENOUS at 06:34

## 2023-03-11 RX ADMIN — HEPARIN SODIUM 400 UNIT(S)/HR: 5000 INJECTION INTRAVENOUS; SUBCUTANEOUS at 04:06

## 2023-03-11 RX ADMIN — CHLORHEXIDINE GLUCONATE 1 APPLICATION(S): 213 SOLUTION TOPICAL at 11:18

## 2023-03-11 RX ADMIN — INSULIN GLARGINE 20 UNIT(S): 100 INJECTION, SOLUTION SUBCUTANEOUS at 22:03

## 2023-03-11 RX ADMIN — PANTOPRAZOLE SODIUM 40 MILLIGRAM(S): 20 TABLET, DELAYED RELEASE ORAL at 05:13

## 2023-03-11 RX ADMIN — Medication 25 MILLIGRAM(S): at 05:12

## 2023-03-11 RX ADMIN — AZITHROMYCIN 255 MILLIGRAM(S): 500 TABLET, FILM COATED ORAL at 09:57

## 2023-03-11 RX ADMIN — RANOLAZINE 500 MILLIGRAM(S): 500 TABLET, FILM COATED, EXTENDED RELEASE ORAL at 17:22

## 2023-03-11 RX ADMIN — SACUBITRIL AND VALSARTAN 1 TABLET(S): 24; 26 TABLET, FILM COATED ORAL at 05:12

## 2023-03-11 RX ADMIN — Medication 650 MILLIGRAM(S): at 21:59

## 2023-03-11 RX ADMIN — CEFTRIAXONE 100 MILLIGRAM(S): 500 INJECTION, POWDER, FOR SOLUTION INTRAMUSCULAR; INTRAVENOUS at 09:36

## 2023-03-11 RX ADMIN — Medication 5 UNIT(S): at 17:55

## 2023-03-11 RX ADMIN — SODIUM CHLORIDE 50 MILLILITER(S): 5 INJECTION, SOLUTION INTRAVENOUS at 18:01

## 2023-03-11 RX ADMIN — SACUBITRIL AND VALSARTAN 1 TABLET(S): 24; 26 TABLET, FILM COATED ORAL at 17:22

## 2023-03-11 NOTE — PROGRESS NOTE ADULT - ASSESSMENT
77 year old M known HFrEF (EF 25% in 2021) s/p CRT-D, paroxysmal Afib on eliquis, CAD s/p CABG in 2016, Bullous pemphigoid, Hypertension, dyslipidemia Present to the ED for shortness of breath and chest pain and found to have decompensated Heart failure.    # Decompensated HFrEF   # Worsening LV dysfunction  # Right pleural effusion   - c/w bumex IV drip @2mg/hr  - target -150ml/hr  - c/w Entresto 49/51   - c/w metoprolol 50mg q8  - Give hypertonic saline x 2  - Metolazone 5mg x 1  - may need aldactone +/- Farxiga   - monitor BMP BID  - strict I&Os  - Thoracentesis 3/9: 1L transudative fluid removed. Heparin restarted at 4am 3/10   - c/w abx and follow up sputum culture   - F/u uric acid level   - Echo : GIID, 16% EF     # Chest pain   - mildly elevated troponin likely type II  - c/w aspirin  - c/w atorvastatin  - c/w metoprolol and ranolazine  - outpatient f/u for ischemic w/up    #Paroxysmal A.fib  - Eliquis stopped  - currently on heparin drip  - c/w metoprolol    #DM  - c/w insulin   - targer -180    #hypethyroid  - c/w levothyroxine  - f/u TSH    #dispo acute  #CODE status: DNI ( as per living will according to patient's wife)   77 year old M known HFrEF (EF 25% in 2021) s/p CRT-D, paroxysmal Afib on eliquis, CAD s/p CABG in 2016, Bullous pemphigoid, Hypertension, dyslipidemia Present to the ED for shortness of breath and chest pain and found to have decompensated Heart failure.    # Decompensated HFrEF   # Worsening LV dysfunction  # Right pleural effusion   - c/w bumex IV drip @2mg/hr  - target -150ml/hr  - c/w Entresto 49/51   - c/w metoprolol 50mg q8  - Metolazone 5mg x 1  - may need aldactone +/- Farxiga   - monitor BMP BID  - strict I&Os  - Thoracentesis 3/9: 1L transudative fluid removed. Heparin restarted at 4am 3/10   - c/w abx and follow up sputum culture   - F/u uric acid level   - Echo : GIID, 16% EF     # Chest pain   - mildly elevated troponin likely type II  - c/w aspirin  - c/w atorvastatin  - c/w metoprolol and ranolazine  - no plan for further invasive workup at this point    #Paroxysmal A.fib  continue heparin drip for now  - c/w metoprolol    #DM  - c/w insulin   - targer -180    #hypethyroid  - c/w levothyroxine  - f/u TSH    #dispo acute  #CODE status: DNI ( as per living will according to patient's wife)

## 2023-03-11 NOTE — PROGRESS NOTE ADULT - SUBJECTIVE AND OBJECTIVE BOX
SUBJECTIVE:    Patient is a 77y old Male who presents with a chief complaint of sob (10 Mar 2023 14:27)    Currently admitted to medicine with the primary diagnosis of Acute exacerbation of CHF (congestive heart failure)       Today is hospital day 4d. This morning he is resting comfortably in bed and reports no new issues or overnight events.     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     ALLERGIES:  contrast media (iodine-based) (Other)    MEDICATIONS:  STANDING MEDICATIONS  albuterol/ipratropium for Nebulization. 3 milliLiter(s) Nebulizer once  aspirin  chewable 81 milliGRAM(s) Oral daily  atorvastatin 40 milliGRAM(s) Oral at bedtime  azithromycin  IVPB 500 milliGRAM(s) IV Intermittent every 24 hours  azithromycin  IVPB      buMETAnide Infusion 2 mG/Hr IV Continuous <Continuous>  cefTRIAXone   IVPB 1000 milliGRAM(s) IV Intermittent every 24 hours  cefTRIAXone   IVPB      chlorhexidine 2% Cloths 1 Application(s) Topical daily  heparin  Infusion.  Unit(s)/Hr IV Continuous <Continuous>  insulin glargine Injectable (LANTUS) 20 Unit(s) SubCutaneous at bedtime  insulin lispro (ADMELOG) corrective regimen sliding scale   SubCutaneous three times a day before meals  insulin lispro Injectable (ADMELOG) 5 Unit(s) SubCutaneous three times a day before meals  levothyroxine 200 MICROGram(s) Oral daily  metoprolol tartrate 25 milliGRAM(s) Oral daily  metoprolol tartrate 50 milliGRAM(s) Oral at bedtime  pantoprazole    Tablet 40 milliGRAM(s) Oral before breakfast  ranolazine 500 milliGRAM(s) Oral two times a day  sacubitril 49 mG/valsartan 51 mG 1 Tablet(s) Oral two times a day  sodium chloride 3%. 150 milliLiter(s) IV Continuous <Continuous>  tamsulosin 0.4 milliGRAM(s) Oral daily    PRN MEDICATIONS  acetaminophen     Tablet .. 650 milliGRAM(s) Oral every 6 hours PRN  albuterol    0.083% 2.5 milliGRAM(s) Nebulizer every 6 hours PRN  heparin   Injectable 6500 Unit(s) IV Push every 6 hours PRN  heparin   Injectable 3000 Unit(s) IV Push every 6 hours PRN    VITALS:   T(F): 98  HR: 69  BP: 124/68  RR: 20  SpO2: 93%    LABS:                        13.0   15.23 )-----------( 359      ( 11 Mar 2023 06:47 )             42.4     03-11    144  |  99  |  41<H>  ----------------------------<  103<H>  3.8   |  33<H>  |  1.9<H>    Ca    9.2      11 Mar 2023 06:47  Mg     1.8     03-11    TPro  5.4<L>  /  Alb  2.8<L>  /  TBili  0.4  /  DBili  x   /  AST  21  /  ALT  16  /  AlkPhos  123<H>  03-10    PT/INR - ( 10 Mar 2023 19:42 )   PT: 14.70 sec;   INR: 1.28 ratio         PTT - ( 11 Mar 2023 09:56 )  PTT:45.2 sec    ABG - ( 09 Mar 2023 21:09 )  pH, Arterial: 7.45  pH, Blood: x     /  pCO2: 48    /  pO2: 78    / HCO3: 33    / Base Excess: 8.1   /  SaO2: 97.3                  Culture - Body Fluid with Gram Stain (collected 09 Mar 2023 16:54)  Source: Pleural Fl Pleural Fluid  Gram Stain (10 Mar 2023 02:05):    polymorphonuclear leukocytes seen    No organisms seen    by cytocentrifuge  Preliminary Report (10 Mar 2023 19:26):    No growth      CARDIAC MARKERS ( 09 Mar 2023 20:41 )  x     / 0.03 ng/mL / x     / x     / 1.3 ng/mL      RADIOLOGY:    PHYSICAL EXAM:  GEN: No acute distress  LUNGS: Clear to auscultation bilaterally   HEART: Regular  ABD: Soft, non-tender, non-distended.  EXT: NC/NC/NE/2+PP/PAGE/Skin Intact.   NEURO: AAOX3    Intravenous access:   NG tube:   Orta Catheter:        SUBJECTIVE:    Patient is a 77y old Male who presents with a chief complaint of sob (10 Mar 2023 14:27)    Currently admitted to medicine with the primary diagnosis of Acute exacerbation of CHF (congestive heart failure)       Today is hospital day 4d. This morning he is resting comfortably in bed and reports no new issues or overnight events.     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     ALLERGIES:  contrast media (iodine-based) (Other)    MEDICATIONS:  STANDING MEDICATIONS  albuterol/ipratropium for Nebulization. 3 milliLiter(s) Nebulizer once  aspirin  chewable 81 milliGRAM(s) Oral daily  atorvastatin 40 milliGRAM(s) Oral at bedtime  azithromycin  IVPB 500 milliGRAM(s) IV Intermittent every 24 hours  azithromycin  IVPB      buMETAnide Infusion 2 mG/Hr IV Continuous <Continuous>  cefTRIAXone   IVPB 1000 milliGRAM(s) IV Intermittent every 24 hours  cefTRIAXone   IVPB      chlorhexidine 2% Cloths 1 Application(s) Topical daily  heparin  Infusion.  Unit(s)/Hr IV Continuous <Continuous>  insulin glargine Injectable (LANTUS) 20 Unit(s) SubCutaneous at bedtime  insulin lispro (ADMELOG) corrective regimen sliding scale   SubCutaneous three times a day before meals  insulin lispro Injectable (ADMELOG) 5 Unit(s) SubCutaneous three times a day before meals  levothyroxine 200 MICROGram(s) Oral daily  metoprolol tartrate 25 milliGRAM(s) Oral daily  metoprolol tartrate 50 milliGRAM(s) Oral at bedtime  pantoprazole    Tablet 40 milliGRAM(s) Oral before breakfast  ranolazine 500 milliGRAM(s) Oral two times a day  sacubitril 49 mG/valsartan 51 mG 1 Tablet(s) Oral two times a day  sodium chloride 3%. 150 milliLiter(s) IV Continuous <Continuous>  tamsulosin 0.4 milliGRAM(s) Oral daily    PRN MEDICATIONS  acetaminophen     Tablet .. 650 milliGRAM(s) Oral every 6 hours PRN  albuterol    0.083% 2.5 milliGRAM(s) Nebulizer every 6 hours PRN  heparin   Injectable 6500 Unit(s) IV Push every 6 hours PRN  heparin   Injectable 3000 Unit(s) IV Push every 6 hours PRN    VITALS:   T(F): 98  HR: 69  BP: 124/68  RR: 20  SpO2: 93%    LABS:                        13.0   15.23 )-----------( 359      ( 11 Mar 2023 06:47 )             42.4     03-11    144  |  99  |  41<H>  ----------------------------<  103<H>  3.8   |  33<H>  |  1.9<H>    Ca    9.2      11 Mar 2023 06:47  Mg     1.8     03-11    TPro  5.4<L>  /  Alb  2.8<L>  /  TBili  0.4  /  DBili  x   /  AST  21  /  ALT  16  /  AlkPhos  123<H>  03-10    PT/INR - ( 10 Mar 2023 19:42 )   PT: 14.70 sec;   INR: 1.28 ratio         PTT - ( 11 Mar 2023 09:56 )  PTT:45.2 sec    ABG - ( 09 Mar 2023 21:09 )  pH, Arterial: 7.45  pH, Blood: x     /  pCO2: 48    /  pO2: 78    / HCO3: 33    / Base Excess: 8.1   /  SaO2: 97.3                  Culture - Body Fluid with Gram Stain (collected 09 Mar 2023 16:54)  Source: Pleural Fl Pleural Fluid  Gram Stain (10 Mar 2023 02:05):    polymorphonuclear leukocytes seen    No organisms seen    by cytocentrifuge  Preliminary Report (10 Mar 2023 19:26):    No growth      CARDIAC MARKERS ( 09 Mar 2023 20:41 )  x     / 0.03 ng/mL / x     / x     / 1.3 ng/mL      RADIOLOGY:    PHYSICAL EXAM:  GEN: mildly tachypneic, stable   LUNGS: decrease bilateral airways entries positive crackles   HEART: Regular  ABD: Soft, non-tender, non-distended.  EXT: NC/NC/NE/2+PP/PAGE/Skin Intact.   NEURO: AAO    Intravenous access:   NG tube:   Orta Catheter:

## 2023-03-11 NOTE — PROGRESS NOTE ADULT - ATTENDING COMMENTS
Patient seen and examined at bedside during morning round 3/11/2023    77 year old M known HFrEF (EF 25% in 2021) s/p CRT-D, paroxysmal Afib on eliquis, CAD s/p CABG in 2016, Bullous pemphigoid, Hypertension, dyslipidemia Present to the ED for shortness of breath and chest pain and found to have decompensated Heart failure.  Patient currently hemodynamically stable, responding to IV diuretics, CXR improved but still congested clinically and physical exam  continue IV bumex 2mg/hr , monitor in/out , Bipap as tolerated    Plan as outlined above note edited

## 2023-03-12 LAB
ANION GAP SERPL CALC-SCNC: 15 MMOL/L — HIGH (ref 7–14)
ANION GAP SERPL CALC-SCNC: 9 MMOL/L — SIGNIFICANT CHANGE UP (ref 7–14)
APTT BLD: 40.1 SEC — HIGH (ref 27–39.2)
APTT BLD: 56.1 SEC — HIGH (ref 27–39.2)
APTT BLD: 56.8 SEC — HIGH (ref 27–39.2)
BASOPHILS # BLD AUTO: 0.06 K/UL — SIGNIFICANT CHANGE UP (ref 0–0.2)
BASOPHILS NFR BLD AUTO: 0.5 % — SIGNIFICANT CHANGE UP (ref 0–1)
BUN SERPL-MCNC: 51 MG/DL — HIGH (ref 10–20)
BUN SERPL-MCNC: 54 MG/DL — HIGH (ref 10–20)
CALCIUM SERPL-MCNC: 9.1 MG/DL — SIGNIFICANT CHANGE UP (ref 8.4–10.4)
CALCIUM SERPL-MCNC: 9.3 MG/DL — SIGNIFICANT CHANGE UP (ref 8.4–10.4)
CHLORIDE SERPL-SCNC: 96 MMOL/L — LOW (ref 98–110)
CHLORIDE SERPL-SCNC: 98 MMOL/L — SIGNIFICANT CHANGE UP (ref 98–110)
CO2 SERPL-SCNC: 31 MMOL/L — SIGNIFICANT CHANGE UP (ref 17–32)
CO2 SERPL-SCNC: 36 MMOL/L — HIGH (ref 17–32)
CREAT SERPL-MCNC: 2.1 MG/DL — HIGH (ref 0.7–1.5)
CREAT SERPL-MCNC: 2.2 MG/DL — HIGH (ref 0.7–1.5)
EGFR: 30 ML/MIN/1.73M2 — LOW
EGFR: 32 ML/MIN/1.73M2 — LOW
EOSINOPHIL # BLD AUTO: 0.76 K/UL — HIGH (ref 0–0.7)
EOSINOPHIL NFR BLD AUTO: 6.5 % — SIGNIFICANT CHANGE UP (ref 0–8)
GLUCOSE BLDC GLUCOMTR-MCNC: 116 MG/DL — HIGH (ref 70–99)
GLUCOSE BLDC GLUCOMTR-MCNC: 118 MG/DL — HIGH (ref 70–99)
GLUCOSE BLDC GLUCOMTR-MCNC: 126 MG/DL — HIGH (ref 70–99)
GLUCOSE BLDC GLUCOMTR-MCNC: 86 MG/DL — SIGNIFICANT CHANGE UP (ref 70–99)
GLUCOSE BLDC GLUCOMTR-MCNC: 88 MG/DL — SIGNIFICANT CHANGE UP (ref 70–99)
GLUCOSE SERPL-MCNC: 108 MG/DL — HIGH (ref 70–99)
GLUCOSE SERPL-MCNC: 133 MG/DL — HIGH (ref 70–99)
HCT VFR BLD CALC: 41.7 % — LOW (ref 42–52)
HGB BLD-MCNC: 13 G/DL — LOW (ref 14–18)
IMM GRANULOCYTES NFR BLD AUTO: 1.3 % — HIGH (ref 0.1–0.3)
LYMPHOCYTES # BLD AUTO: 18.6 % — LOW (ref 20.5–51.1)
LYMPHOCYTES # BLD AUTO: 2.17 K/UL — SIGNIFICANT CHANGE UP (ref 1.2–3.4)
MAGNESIUM SERPL-MCNC: 1.8 MG/DL — SIGNIFICANT CHANGE UP (ref 1.8–2.4)
MCHC RBC-ENTMCNC: 26.6 PG — LOW (ref 27–31)
MCHC RBC-ENTMCNC: 31.2 G/DL — LOW (ref 32–37)
MCV RBC AUTO: 85.5 FL — SIGNIFICANT CHANGE UP (ref 80–94)
MONOCYTES # BLD AUTO: 0.92 K/UL — HIGH (ref 0.1–0.6)
MONOCYTES NFR BLD AUTO: 7.9 % — SIGNIFICANT CHANGE UP (ref 1.7–9.3)
NEUTROPHILS # BLD AUTO: 7.63 K/UL — HIGH (ref 1.4–6.5)
NEUTROPHILS NFR BLD AUTO: 65.2 % — SIGNIFICANT CHANGE UP (ref 42.2–75.2)
NRBC # BLD: 0 /100 WBCS — SIGNIFICANT CHANGE UP (ref 0–0)
PLATELET # BLD AUTO: 362 K/UL — SIGNIFICANT CHANGE UP (ref 130–400)
POTASSIUM SERPL-MCNC: 3.5 MMOL/L — SIGNIFICANT CHANGE UP (ref 3.5–5)
POTASSIUM SERPL-MCNC: 4.1 MMOL/L — SIGNIFICANT CHANGE UP (ref 3.5–5)
POTASSIUM SERPL-SCNC: 3.5 MMOL/L — SIGNIFICANT CHANGE UP (ref 3.5–5)
POTASSIUM SERPL-SCNC: 4.1 MMOL/L — SIGNIFICANT CHANGE UP (ref 3.5–5)
RBC # BLD: 4.88 M/UL — SIGNIFICANT CHANGE UP (ref 4.7–6.1)
RBC # FLD: 14.6 % — HIGH (ref 11.5–14.5)
SODIUM SERPL-SCNC: 142 MMOL/L — SIGNIFICANT CHANGE UP (ref 135–146)
SODIUM SERPL-SCNC: 143 MMOL/L — SIGNIFICANT CHANGE UP (ref 135–146)
WBC # BLD: 11.69 K/UL — HIGH (ref 4.8–10.8)
WBC # FLD AUTO: 11.69 K/UL — HIGH (ref 4.8–10.8)

## 2023-03-12 PROCEDURE — 99291 CRITICAL CARE FIRST HOUR: CPT

## 2023-03-12 PROCEDURE — 71045 X-RAY EXAM CHEST 1 VIEW: CPT | Mod: 26

## 2023-03-12 RX ORDER — GUAIFENESIN/DEXTROMETHORPHAN 600MG-30MG
10 TABLET, EXTENDED RELEASE 12 HR ORAL THREE TIMES A DAY
Refills: 0 | Status: DISCONTINUED | OUTPATIENT
Start: 2023-03-12 | End: 2023-03-17

## 2023-03-12 RX ORDER — SODIUM CHLORIDE 5 G/100ML
150 INJECTION, SOLUTION INTRAVENOUS
Refills: 0 | Status: DISCONTINUED | OUTPATIENT
Start: 2023-03-12 | End: 2023-03-13

## 2023-03-12 RX ORDER — MAGNESIUM SULFATE 500 MG/ML
2 VIAL (ML) INJECTION ONCE
Refills: 0 | Status: COMPLETED | OUTPATIENT
Start: 2023-03-12 | End: 2023-03-12

## 2023-03-12 RX ORDER — POTASSIUM CHLORIDE 20 MEQ
40 PACKET (EA) ORAL ONCE
Refills: 0 | Status: COMPLETED | OUTPATIENT
Start: 2023-03-12 | End: 2023-03-12

## 2023-03-12 RX ADMIN — HEPARIN SODIUM 3000 UNIT(S): 5000 INJECTION INTRAVENOUS; SUBCUTANEOUS at 18:10

## 2023-03-12 RX ADMIN — HEPARIN SODIUM 3000 UNIT(S): 5000 INJECTION INTRAVENOUS; SUBCUTANEOUS at 10:15

## 2023-03-12 RX ADMIN — AZITHROMYCIN 255 MILLIGRAM(S): 500 TABLET, FILM COATED ORAL at 08:55

## 2023-03-12 RX ADMIN — Medication 5 UNIT(S): at 12:14

## 2023-03-12 RX ADMIN — Medication 5 UNIT(S): at 08:53

## 2023-03-12 RX ADMIN — PANTOPRAZOLE SODIUM 40 MILLIGRAM(S): 20 TABLET, DELAYED RELEASE ORAL at 05:26

## 2023-03-12 RX ADMIN — SACUBITRIL AND VALSARTAN 1 TABLET(S): 24; 26 TABLET, FILM COATED ORAL at 17:51

## 2023-03-12 RX ADMIN — Medication 50 MILLIGRAM(S): at 21:10

## 2023-03-12 RX ADMIN — RANOLAZINE 500 MILLIGRAM(S): 500 TABLET, FILM COATED, EXTENDED RELEASE ORAL at 17:51

## 2023-03-12 RX ADMIN — SODIUM CHLORIDE 50 MILLILITER(S): 5 INJECTION, SOLUTION INTRAVENOUS at 05:25

## 2023-03-12 RX ADMIN — Medication 81 MILLIGRAM(S): at 12:15

## 2023-03-12 RX ADMIN — Medication 25 GRAM(S): at 14:58

## 2023-03-12 RX ADMIN — Medication 40 MILLIEQUIVALENT(S): at 10:14

## 2023-03-12 RX ADMIN — Medication 200 MICROGRAM(S): at 05:26

## 2023-03-12 RX ADMIN — HEPARIN SODIUM 1000 UNIT(S)/HR: 5000 INJECTION INTRAVENOUS; SUBCUTANEOUS at 01:20

## 2023-03-12 RX ADMIN — BUMETANIDE 10 MG/HR: 0.25 INJECTION INTRAMUSCULAR; INTRAVENOUS at 08:53

## 2023-03-12 RX ADMIN — ATORVASTATIN CALCIUM 40 MILLIGRAM(S): 80 TABLET, FILM COATED ORAL at 21:10

## 2023-03-12 RX ADMIN — CHLORHEXIDINE GLUCONATE 1 APPLICATION(S): 213 SOLUTION TOPICAL at 12:14

## 2023-03-12 RX ADMIN — RANOLAZINE 500 MILLIGRAM(S): 500 TABLET, FILM COATED, EXTENDED RELEASE ORAL at 05:25

## 2023-03-12 RX ADMIN — SACUBITRIL AND VALSARTAN 1 TABLET(S): 24; 26 TABLET, FILM COATED ORAL at 05:26

## 2023-03-12 RX ADMIN — HEPARIN SODIUM 1200 UNIT(S)/HR: 5000 INJECTION INTRAVENOUS; SUBCUTANEOUS at 10:15

## 2023-03-12 RX ADMIN — Medication 5 UNIT(S): at 17:50

## 2023-03-12 RX ADMIN — CEFTRIAXONE 100 MILLIGRAM(S): 500 INJECTION, POWDER, FOR SOLUTION INTRAMUSCULAR; INTRAVENOUS at 10:14

## 2023-03-12 RX ADMIN — TAMSULOSIN HYDROCHLORIDE 0.4 MILLIGRAM(S): 0.4 CAPSULE ORAL at 12:15

## 2023-03-12 RX ADMIN — Medication 25 MILLIGRAM(S): at 05:26

## 2023-03-12 RX ADMIN — BUMETANIDE 10 MG/HR: 0.25 INJECTION INTRAMUSCULAR; INTRAVENOUS at 18:45

## 2023-03-12 RX ADMIN — HEPARIN SODIUM 1400 UNIT(S)/HR: 5000 INJECTION INTRAVENOUS; SUBCUTANEOUS at 18:07

## 2023-03-12 RX ADMIN — Medication 10 MILLILITER(S): at 21:42

## 2023-03-12 RX ADMIN — HEPARIN SODIUM 3000 UNIT(S): 5000 INJECTION INTRAVENOUS; SUBCUTANEOUS at 01:27

## 2023-03-12 NOTE — PROGRESS NOTE ADULT - ASSESSMENT
77 year old M known HFrEF (EF 25% in 2021) s/p CRT-D, paroxysmal Afib on eliquis, CAD s/p CABG in 2016, Bullous pemphigoid, Hypertension, dyslipidemia Present to the ED for shortness of breath and chest pain and found to have decompensated Heart failure.    # Decompensated HFrEF   # Worsening LV dysfunction  # Right pleural effusion   - target -150ml/hr  - c/w bumex IV drip @2mg/hr  - s/p Metolazone 5mg x 1  - c/w Entresto 49/51   - c/w metoprolol 50mg q8  - Give hypertonic saline x 2  - may need aldactone +/- Farxiga   - monitor BMP BID  - strict I&Os  - Thoracentesis 3/9: 1L transudative fluid removed. Heparin restarted at 4am 3/10   - c/w abx and follow up sputum culture   - F/u uric acid level   - Echo : GIID, 16% EF     # Chest pain   - mildly elevated troponin likely type II  - c/w aspirin  - c/w atorvastatin  - c/w metoprolol and ranolazine  - outpatient f/u for ischemic w/up    #Paroxysmal A.fib  - Eliquis stopped  - currently on heparin drip  - c/w metoprolol    #DM  - c/w insulin   - targer -180    #hypethyroid  - c/w levothyroxine    DVT ppx: Heparin Drip   GI ppx: Pantoprazole  #CODE status: DNI ( as per living will according to patient's wife)

## 2023-03-12 NOTE — PROGRESS NOTE ADULT - SUBJECTIVE AND OBJECTIVE BOX
24H events:    Patient is a 77y old Male who presents with a chief complaint of sob (11 Mar 2023 11:21)    Primary diagnosis of Acute exacerbation of CHF (congestive heart failure)       Today is hospital day 5d. This morning patient was seen and examined at bedside, resting comfortably in bed.      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 3 milliLiter(s) Nebulizer once  aspirin  chewable 81 milliGRAM(s) Oral daily  atorvastatin 40 milliGRAM(s) Oral at bedtime  azithromycin  IVPB 500 milliGRAM(s) IV Intermittent every 24 hours  azithromycin  IVPB      buMETAnide Infusion 2 mG/Hr IV Continuous <Continuous>  cefTRIAXone   IVPB 1000 milliGRAM(s) IV Intermittent every 24 hours  cefTRIAXone   IVPB      chlorhexidine 2% Cloths 1 Application(s) Topical daily  heparin  Infusion.  Unit(s)/Hr IV Continuous <Continuous>  insulin glargine Injectable (LANTUS) 20 Unit(s) SubCutaneous at bedtime  insulin lispro (ADMELOG) corrective regimen sliding scale   SubCutaneous three times a day before meals  insulin lispro Injectable (ADMELOG) 5 Unit(s) SubCutaneous three times a day before meals  levothyroxine 200 MICROGram(s) Oral daily  metoprolol tartrate 25 milliGRAM(s) Oral daily  metoprolol tartrate 50 milliGRAM(s) Oral at bedtime  pantoprazole    Tablet 40 milliGRAM(s) Oral before breakfast  ranolazine 500 milliGRAM(s) Oral two times a day  sacubitril 49 mG/valsartan 51 mG 1 Tablet(s) Oral two times a day  sodium chloride 3%. 150 milliLiter(s) IV Continuous <Continuous>  sodium chloride 3%. 150 milliLiter(s) IV Continuous <Continuous>  sodium chloride 3%. 150 milliLiter(s) IV Continuous <Continuous>  tamsulosin 0.4 milliGRAM(s) Oral daily    PRN MEDICATIONS  acetaminophen     Tablet .. 650 milliGRAM(s) Oral every 6 hours PRN  albuterol    0.083% 2.5 milliGRAM(s) Nebulizer every 6 hours PRN  heparin   Injectable 6500 Unit(s) IV Push every 6 hours PRN  heparin   Injectable 3000 Unit(s) IV Push every 6 hours PRN        03-11-23 @ 06:01  -  03-12-23 @ 07:00  --------------------------------------------------------  IN: 1800 mL / OUT: 1900 mL / NET: -100 mL    03-12-23 @ 07:01  -  03-12-23 @ 12:42  --------------------------------------------------------  IN: 0 mL / OUT: 600 mL / NET: -600 mL        LABS:                        13.0   11.69 )-----------( 362      ( 12 Mar 2023 06:43 )             41.7     03-12    143  |  98  |  51<H>  ----------------------------<  133<H>  3.5   |  36<H>  |  2.2<H>    Ca    9.1      12 Mar 2023 06:43  Mg     1.8     03-12      PT/INR - ( 10 Mar 2023 19:42 )   PT: 14.70 sec;   INR: 1.28 ratio         PTT - ( 12 Mar 2023 08:51 )  PTT:56.8 sec          Culture - Blood (collected 10 Mar 2023 22:00)  Source: .Blood Blood-Venous  Preliminary Report (12 Mar 2023 03:01):    No growth to date.    Culture - Body Fluid with Gram Stain (collected 09 Mar 2023 16:54)  Source: Pleural Fl Pleural Fluid  Gram Stain (10 Mar 2023 02:05):    polymorphonuclear leukocytes seen    No organisms seen    by cytocentrifuge  Preliminary Report (10 Mar 2023 19:26):    No growth    RADIOLOGY:    VITALS:   T(F): 97.7  HR: 77  BP: 125/76  RR: 17  SpO2: 94%    PHYSICAL EXAM:    GENERAL: NAD  HEAD:  Atraumatic, Normocephalic  EYES: EOMI  NECK: Supple  NERVOUS SYSTEM:  Alert & Oriented X3, motor 5/5 b/l upper and lower ext, sensation intact  CHEST/LUNG: Poor inspiratory effort  HEART: Regular rate and rhythm; No audible murmurs  ABDOMEN: Soft, Nontender, Nondistended; Bowel sounds present  EXTREMITIES: No clubbing, cyanosis, or edema  LYMPH: No lymphadenopathy noted  SKIN: No rashes or lesions

## 2023-03-12 NOTE — PROGRESS NOTE ADULT - ATTENDING COMMENTS
Patient seen and examined at bedside during morning round 3/11/2023    77 year old M known HFrEF (EF 25% in 2021) s/p CRT-D, paroxysmal Afib on eliquis, CAD s/p CABG in 2016, Bullous pemphigoid, Hypertension, dyslipidemia Present to the ED for shortness of breath and chest pain and found to have decompensated Heart failure.    patient currently stable, negative balance as patient is loosing weight, clinically improving will continue same dose of IV diuresis and correct electrolytes    Plan as outlined Patient seen and examined at bedside during morning round 3/12/2023    77 year old M known HFrEF (EF 25% in 2021) s/p CRT-D, paroxysmal Afib on eliquis, CAD s/p CABG in 2016, Bullous pemphigoid, Hypertension, dyslipidemia Present to the ED for shortness of breath and chest pain and found to have decompensated Heart failure.    patient currently stable, negative balance as patient is loosing weight, clinically improving will continue same dose of IV diuresis and correct electrolytes    Plan as outlined

## 2023-03-12 NOTE — SWALLOW BEDSIDE ASSESSMENT ADULT - SLP GENERAL OBSERVATIONS
Received at bedside awake/alert. denied pain. 02 via NC. Denies difficulties swallowing food, occasional difficulties with large pills

## 2023-03-12 NOTE — SWALLOW BEDSIDE ASSESSMENT ADULT - SWALLOW EVAL: DIAGNOSIS
+toleration for thin liquids, puree solids, soft and bite-sized, regular solids w/o overt s/s of penetration/aspiration

## 2023-03-13 LAB
ALBUMIN SERPL ELPH-MCNC: 2.5 G/DL — LOW (ref 3.5–5.2)
ALP SERPL-CCNC: 124 U/L — HIGH (ref 30–115)
ALT FLD-CCNC: 16 U/L — SIGNIFICANT CHANGE UP (ref 0–41)
ANION GAP SERPL CALC-SCNC: 14 MMOL/L — SIGNIFICANT CHANGE UP (ref 7–14)
APTT BLD: 54.1 SEC — HIGH (ref 27–39.2)
APTT BLD: 73.4 SEC — CRITICAL HIGH (ref 27–39.2)
APTT BLD: >200 SEC — CRITICAL HIGH (ref 27–39.2)
AST SERPL-CCNC: 24 U/L — SIGNIFICANT CHANGE UP (ref 0–41)
BASOPHILS # BLD AUTO: 0.11 K/UL — SIGNIFICANT CHANGE UP (ref 0–0.2)
BASOPHILS NFR BLD AUTO: 1 % — SIGNIFICANT CHANGE UP (ref 0–1)
BILIRUB SERPL-MCNC: 0.3 MG/DL — SIGNIFICANT CHANGE UP (ref 0.2–1.2)
BUN SERPL-MCNC: 58 MG/DL — HIGH (ref 10–20)
CALCIUM SERPL-MCNC: 9.1 MG/DL — SIGNIFICANT CHANGE UP (ref 8.4–10.4)
CHLORIDE SERPL-SCNC: 96 MMOL/L — LOW (ref 98–110)
CO2 SERPL-SCNC: 31 MMOL/L — SIGNIFICANT CHANGE UP (ref 17–32)
CREAT SERPL-MCNC: 2.2 MG/DL — HIGH (ref 0.7–1.5)
EGFR: 30 ML/MIN/1.73M2 — LOW
EOSINOPHIL # BLD AUTO: 1.02 K/UL — HIGH (ref 0–0.7)
EOSINOPHIL NFR BLD AUTO: 9 % — HIGH (ref 0–8)
GLUCOSE BLDC GLUCOMTR-MCNC: 119 MG/DL — HIGH (ref 70–99)
GLUCOSE BLDC GLUCOMTR-MCNC: 158 MG/DL — HIGH (ref 70–99)
GLUCOSE BLDC GLUCOMTR-MCNC: 221 MG/DL — HIGH (ref 70–99)
GLUCOSE BLDC GLUCOMTR-MCNC: 78 MG/DL — SIGNIFICANT CHANGE UP (ref 70–99)
GLUCOSE SERPL-MCNC: 120 MG/DL — HIGH (ref 70–99)
HCT VFR BLD CALC: 43.4 % — SIGNIFICANT CHANGE UP (ref 42–52)
HGB BLD-MCNC: 13.4 G/DL — LOW (ref 14–18)
IMM GRANULOCYTES NFR BLD AUTO: 2 % — HIGH (ref 0.1–0.3)
LYMPHOCYTES # BLD AUTO: 2.43 K/UL — SIGNIFICANT CHANGE UP (ref 1.2–3.4)
LYMPHOCYTES # BLD AUTO: 21.4 % — SIGNIFICANT CHANGE UP (ref 20.5–51.1)
MAGNESIUM SERPL-MCNC: 2.2 MG/DL — SIGNIFICANT CHANGE UP (ref 1.8–2.4)
MCHC RBC-ENTMCNC: 26.1 PG — LOW (ref 27–31)
MCHC RBC-ENTMCNC: 30.9 G/DL — LOW (ref 32–37)
MCV RBC AUTO: 84.6 FL — SIGNIFICANT CHANGE UP (ref 80–94)
MONOCYTES # BLD AUTO: 0.82 K/UL — HIGH (ref 0.1–0.6)
MONOCYTES NFR BLD AUTO: 7.2 % — SIGNIFICANT CHANGE UP (ref 1.7–9.3)
NEUTROPHILS # BLD AUTO: 6.72 K/UL — HIGH (ref 1.4–6.5)
NEUTROPHILS NFR BLD AUTO: 59.4 % — SIGNIFICANT CHANGE UP (ref 42.2–75.2)
NRBC # BLD: 0 /100 WBCS — SIGNIFICANT CHANGE UP (ref 0–0)
PLATELET # BLD AUTO: 373 K/UL — SIGNIFICANT CHANGE UP (ref 130–400)
POTASSIUM SERPL-MCNC: 3.9 MMOL/L — SIGNIFICANT CHANGE UP (ref 3.5–5)
POTASSIUM SERPL-SCNC: 3.9 MMOL/L — SIGNIFICANT CHANGE UP (ref 3.5–5)
PROT SERPL-MCNC: 5.4 G/DL — LOW (ref 6–8)
RBC # BLD: 5.13 M/UL — SIGNIFICANT CHANGE UP (ref 4.7–6.1)
RBC # FLD: 14.5 % — SIGNIFICANT CHANGE UP (ref 11.5–14.5)
SODIUM SERPL-SCNC: 141 MMOL/L — SIGNIFICANT CHANGE UP (ref 135–146)
WBC # BLD: 11.33 K/UL — HIGH (ref 4.8–10.8)
WBC # FLD AUTO: 11.33 K/UL — HIGH (ref 4.8–10.8)

## 2023-03-13 PROCEDURE — 93284 PRGRMG EVAL IMPLANTABLE DFB: CPT | Mod: 26

## 2023-03-13 PROCEDURE — 99233 SBSQ HOSP IP/OBS HIGH 50: CPT | Mod: 25

## 2023-03-13 PROCEDURE — 99232 SBSQ HOSP IP/OBS MODERATE 35: CPT

## 2023-03-13 RX ORDER — SODIUM CHLORIDE 5 G/100ML
150 INJECTION, SOLUTION INTRAVENOUS
Refills: 0 | Status: DISCONTINUED | OUTPATIENT
Start: 2023-03-13 | End: 2023-03-13

## 2023-03-13 RX ADMIN — Medication 650 MILLIGRAM(S): at 21:30

## 2023-03-13 RX ADMIN — BUMETANIDE 10 MG/HR: 0.25 INJECTION INTRAMUSCULAR; INTRAVENOUS at 05:05

## 2023-03-13 RX ADMIN — Medication 50 MILLIGRAM(S): at 21:39

## 2023-03-13 RX ADMIN — Medication 200 MICROGRAM(S): at 05:06

## 2023-03-13 RX ADMIN — RANOLAZINE 500 MILLIGRAM(S): 500 TABLET, FILM COATED, EXTENDED RELEASE ORAL at 17:13

## 2023-03-13 RX ADMIN — RANOLAZINE 500 MILLIGRAM(S): 500 TABLET, FILM COATED, EXTENDED RELEASE ORAL at 05:06

## 2023-03-13 RX ADMIN — HEPARIN SODIUM 1100 UNIT(S)/HR: 5000 INJECTION INTRAVENOUS; SUBCUTANEOUS at 12:11

## 2023-03-13 RX ADMIN — SACUBITRIL AND VALSARTAN 1 TABLET(S): 24; 26 TABLET, FILM COATED ORAL at 17:13

## 2023-03-13 RX ADMIN — Medication 4: at 12:10

## 2023-03-13 RX ADMIN — BUMETANIDE 10 MG/HR: 0.25 INJECTION INTRAMUSCULAR; INTRAVENOUS at 16:26

## 2023-03-13 RX ADMIN — Medication 25 MILLIGRAM(S): at 05:06

## 2023-03-13 RX ADMIN — SACUBITRIL AND VALSARTAN 1 TABLET(S): 24; 26 TABLET, FILM COATED ORAL at 05:06

## 2023-03-13 RX ADMIN — Medication 10 MILLILITER(S): at 17:15

## 2023-03-13 RX ADMIN — Medication 81 MILLIGRAM(S): at 11:29

## 2023-03-13 RX ADMIN — AZITHROMYCIN 255 MILLIGRAM(S): 500 TABLET, FILM COATED ORAL at 09:36

## 2023-03-13 RX ADMIN — PANTOPRAZOLE SODIUM 40 MILLIGRAM(S): 20 TABLET, DELAYED RELEASE ORAL at 05:06

## 2023-03-13 RX ADMIN — CHLORHEXIDINE GLUCONATE 1 APPLICATION(S): 213 SOLUTION TOPICAL at 11:29

## 2023-03-13 RX ADMIN — HEPARIN SODIUM 0 UNIT(S)/HR: 5000 INJECTION INTRAVENOUS; SUBCUTANEOUS at 00:57

## 2023-03-13 RX ADMIN — INSULIN GLARGINE 20 UNIT(S): 100 INJECTION, SOLUTION SUBCUTANEOUS at 21:48

## 2023-03-13 RX ADMIN — HEPARIN SODIUM 1300 UNIT(S)/HR: 5000 INJECTION INTRAVENOUS; SUBCUTANEOUS at 19:21

## 2023-03-13 RX ADMIN — Medication 5 UNIT(S): at 08:37

## 2023-03-13 RX ADMIN — CEFTRIAXONE 100 MILLIGRAM(S): 500 INJECTION, POWDER, FOR SOLUTION INTRAMUSCULAR; INTRAVENOUS at 09:33

## 2023-03-13 RX ADMIN — TAMSULOSIN HYDROCHLORIDE 0.4 MILLIGRAM(S): 0.4 CAPSULE ORAL at 11:29

## 2023-03-13 RX ADMIN — ATORVASTATIN CALCIUM 40 MILLIGRAM(S): 80 TABLET, FILM COATED ORAL at 21:39

## 2023-03-13 RX ADMIN — SODIUM CHLORIDE 50 MILLILITER(S): 5 INJECTION, SOLUTION INTRAVENOUS at 06:01

## 2023-03-13 RX ADMIN — Medication 5 UNIT(S): at 11:32

## 2023-03-13 RX ADMIN — HEPARIN SODIUM 1100 UNIT(S)/HR: 5000 INJECTION INTRAVENOUS; SUBCUTANEOUS at 02:00

## 2023-03-13 RX ADMIN — BUMETANIDE 10 MG/HR: 0.25 INJECTION INTRAMUSCULAR; INTRAVENOUS at 21:53

## 2023-03-13 RX ADMIN — Medication 650 MILLIGRAM(S): at 21:39

## 2023-03-13 NOTE — PROGRESS NOTE ADULT - ASSESSMENT
Impression:    CHF exacerbation  HO CHFrEF  Pulmonary edema  large rt pleural effusion  HO pAfib on AC  Ho CAD  Former smoker of 20 PY       Plan:    Stable infiltrates; effusion resolved on CXR   Continue diuresis per cardiology   Pleural fluid transudate by protein ration; culture negative; cytology pending   Wean oxygen as tolerated; keep spo2 more than 92%  Recommend nebulized albuterol PRN for the wheezing   He did smoke for 20 years in the past; could have obstructive airway disease; outpt PFT  Aspiration precaution  Continue therapeutic anticoagulation and keep PTT therapeutic   Will follow as needed

## 2023-03-13 NOTE — PROGRESS NOTE ADULT - SUBJECTIVE AND OBJECTIVE BOX
Patient is a 77y old  Male who presents with a chief complaint of sob (12 Mar 2023 12:42)        Over Night Events:    On 3LPM   Feels comfortable       ROS:  See HPI    PHYSICAL EXAM    ICU Vital Signs Last 24 Hrs  T(C): 36.2 (13 Mar 2023 07:39), Max: 37.1 (12 Mar 2023 14:24)  T(F): 97.2 (13 Mar 2023 07:39), Max: 98.8 (12 Mar 2023 14:24)  HR: 70 (13 Mar 2023 07:39) (67 - 93)  BP: 112/61 (13 Mar 2023 07:39) (91/53 - 130/75)  BP(mean): 79 (13 Mar 2023 07:39) (66 - 96)  ABP: --  ABP(mean): --  RR: 24 (13 Mar 2023 07:39) (16 - 24)  SpO2: 94% (13 Mar 2023 07:39) (90% - 97%)    O2 Parameters below as of 13 Mar 2023 07:39  Patient On (Oxygen Delivery Method): nasal cannula  O2 Flow (L/min): 4          General: NAD  HEENT: LAURO             Lymphatic system: No cervical LN   Lungs: Bilateral BS, breath sounds better, no significant rales   Cardiovascular: Regular   Gastrointestinal: Soft, Positive BS  Extremities: No clubbing.  Moves extremities.  Full Range of motion   Skin: Warm, intact  Neurological: No motor or sensory deficit       03-12-23 @ 07:01  -  03-13-23 @ 07:00  --------------------------------------------------------  IN:    Bumetanide: 230 mL    Heparin Infusion: 268 mL    IV PiggyBack: 250 mL    IV PiggyBack: 50 mL    IV PiggyBack: 50 mL    Oral Fluid: 510 mL    sodium chloride 3%: 100 mL  Total IN: 1458 mL    OUT:    Voided (mL): 2250 mL  Total OUT: 2250 mL    Total NET: -792 mL      03-13-23 @ 07:01  -  03-13-23 @ 10:06  --------------------------------------------------------  IN:    Oral Fluid: 210 mL  Total IN: 210 mL    OUT:    Voided (mL): 100 mL  Total OUT: 100 mL    Total NET: 110 mL          LABS:                            13.4   11.33 )-----------( 373      ( 13 Mar 2023 05:57 )             43.4                                               03-13    141  |  96<L>  |  58<H>  ----------------------------<  120<H>  3.9   |  31  |  2.2<H>    Ca    9.1      13 Mar 2023 05:57  Mg     2.2     03-13    TPro  5.4<L>  /  Alb  2.5<L>  /  TBili  0.3  /  DBili  x   /  AST  24  /  ALT  16  /  AlkPhos  124<H>  03-13      PTT - ( 13 Mar 2023 00:00 )  PTT:>200.0 sec                                                                                     LIVER FUNCTIONS - ( 13 Mar 2023 05:57 )  Alb: 2.5 g/dL / Pro: 5.4 g/dL / ALK PHOS: 124 U/L / ALT: 16 U/L / AST: 24 U/L / GGT: x                                                  Culture - Blood (collected 10 Mar 2023 22:00)  Source: .Blood Blood-Venous  Preliminary Report (12 Mar 2023 03:01):    No growth to date.                                                                                           MEDICATIONS  (STANDING):  albuterol/ipratropium for Nebulization. 3 milliLiter(s) Nebulizer once  aspirin  chewable 81 milliGRAM(s) Oral daily  atorvastatin 40 milliGRAM(s) Oral at bedtime  azithromycin  IVPB 500 milliGRAM(s) IV Intermittent every 24 hours  azithromycin  IVPB      buMETAnide Infusion 2 mG/Hr (10 mL/Hr) IV Continuous <Continuous>  cefTRIAXone   IVPB 1000 milliGRAM(s) IV Intermittent every 24 hours  cefTRIAXone   IVPB      chlorhexidine 2% Cloths 1 Application(s) Topical daily  heparin  Infusion.  Unit(s)/Hr (15 mL/Hr) IV Continuous <Continuous>  insulin glargine Injectable (LANTUS) 20 Unit(s) SubCutaneous at bedtime  insulin lispro (ADMELOG) corrective regimen sliding scale   SubCutaneous three times a day before meals  insulin lispro Injectable (ADMELOG) 5 Unit(s) SubCutaneous three times a day before meals  levothyroxine 200 MICROGram(s) Oral daily  metoprolol tartrate 25 milliGRAM(s) Oral daily  metoprolol tartrate 50 milliGRAM(s) Oral at bedtime  pantoprazole    Tablet 40 milliGRAM(s) Oral before breakfast  ranolazine 500 milliGRAM(s) Oral two times a day  sacubitril 49 mG/valsartan 51 mG 1 Tablet(s) Oral two times a day  sodium chloride 3%. 150 milliLiter(s) (50 mL/Hr) IV Continuous <Continuous>  sodium chloride 3%. 150 milliLiter(s) (50 mL/Hr) IV Continuous <Continuous>  sodium chloride 3%. 150 milliLiter(s) (50 mL/Hr) IV Continuous <Continuous>  sodium chloride 3%. 150 milliLiter(s) (50 mL/Hr) IV Continuous <Continuous>  sodium chloride 3%. 150 milliLiter(s) (50 mL/Hr) IV Continuous <Continuous>  tamsulosin 0.4 milliGRAM(s) Oral daily    MEDICATIONS  (PRN):  acetaminophen     Tablet .. 650 milliGRAM(s) Oral every 6 hours PRN Temp greater or equal to 38C (100.4F), Mild Pain (1 - 3)  albuterol    0.083% 2.5 milliGRAM(s) Nebulizer every 6 hours PRN Shortness of Breath and/or Wheezing  guaifenesin/dextromethorphan Oral Liquid 10 milliLiter(s) Oral three times a day PRN Cough  heparin   Injectable 6500 Unit(s) IV Push every 6 hours PRN For aPTT less than 40  heparin   Injectable 3000 Unit(s) IV Push every 6 hours PRN For aPTT between 40 - 57      Xrays: improved                                                                                    ECHO

## 2023-03-13 NOTE — PHYSICAL THERAPY INITIAL EVALUATION ADULT - ASSISTIVE DEVICE:SIT/SUPINE, REHAB EVAL
Pt's wife calling regarding pt, verified name and . She voiced that pt is sweating, is really thirsty, not acting himself as well. Advised to go to ER for symptoms of glucose abnormality. Pt declined because he doesn't want to miss a day of work. Advised to check BS, they voiced that they didn't have a way to do this. She voiced that pt has struggled with hypoglycemia for a long time but he drinks a lot of water now and not sodas. Advised again to go the ER, they declined again, advised to go to  they declined. Took a same day appointment on a day he has off, she voiced understanding about appointment. Advised to go to ER if symptoms worsen, she voiced understanding.
bed rails

## 2023-03-13 NOTE — PROGRESS NOTE ADULT - ASSESSMENT
77 year old M known HFrEF (EF 25% in 2021) s/p CRT-D, paroxysmal Afib on eliquis, CAD s/p CABG in 2016, Bullous pemphigoid, Hypertension, dyslipidemia Present to the ED for shortness of breath and chest pain and found to have decompensated Heart failure.    # Decompensated HFrEF   # Worsening LV dysfunction  # Right pleural effusion   - target -150ml/hr  - c/w bumex IV drip @2mg/hr  - s/p Metolazone 5mg x 1  - c/w Entresto 49/51   - c/w metoprolol 50mg and 25 mg QD  - Give hypertonic saline x 2  - may need aldactone +/- Farxiga   - monitor BMP BID  - strict I&Os  - Thoracentesis 3/9: 1L transudative fluid removed. Heparin restarted at 4am 3/10   - c/w abx and follow up sputum culture. Abx till tomorrow. Pleural fluid cx negative  - 03/13: Patient had a run of [facility], consulted with EP regarding this as he has CRT-D   - F/u uric acid level   - Echo : GIID, 16% EF     # Chest pain   - mildly elevated troponin likely type II  - c/w aspirin  - c/w atorvastatin  - c/w metoprolol and ranolazine  - outpatient f/u for ischemic w/up    #Paroxysmal A.fib  - Eliquis stopped  - currently on heparin drip  - c/w metoprolol    #DM  - c/w insulin   - targer -180    #hypethyroid  - c/w levothyroxine    DVT ppx: Heparin Drip   GI ppx: Pantoprazole  #CODE status: DNI ( as per living will according to patient's wife)

## 2023-03-13 NOTE — PROGRESS NOTE ADULT - SUBJECTIVE AND OBJECTIVE BOX
Patient is a 77y old  Male who presents with a chief complaint of sob (13 Mar 2023 10:05)      HPI:  77-year-old male with pmhx of multiple vessel disease s/p four-vessel bypass 6 years ago, DM, HFrEF 25-30% presents to the ED from doctor's office for evaluation of difficulty breathing and possible STEMI.  EMS states their prehospital EKG demonstrated STEMI and case was called in to 911 system for prearrival. In the emergency department the patient complains of worsening dyspnea over the last 4 days, started on Friday with a cough, progressed to PND and orthopnea for several days despite escalation in diuretics. Also reports chest pain on arrival as well. He is followed by cardiologist Dr. Godinez with no recent changes in status.    In the ED, patient is on bipap comfortable and not in respiratory distress. Currently hemodynamically stable with no drips.     Vital Signs Last 24 Hrs  T(C): --  T(F): --  HR: 98 (07 Mar 2023 15:25) (95 - 105)  BP: 132/66 (07 Mar 2023 15:25) (132/66 - 132/66)  BP(mean): --  RR: 32 (07 Mar 2023 15:25) (30 - 32)  SpO2: 100% (07 Mar 2023 15:25) (95% - 100%)    Parameters below as of 07 Mar 2023 15:25  Patient On (Oxygen Delivery Method): BiPAP/CPAP       (07 Mar 2023 17:35)      PAST MEDICAL & SURGICAL HISTORY:  Coronary artery disease with other form of angina pectoris      Type 2 diabetes mellitus with other neurologic complication, with long-term current use of insulin      Hypertension, unspecified type      High cholesterol      Hypothyroidism, unspecified type      HFrEF (heart failure with reduced ejection fraction)      Artificial cardiac pacemaker      S/P CABG x 6      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          Home Medications:  aspirin 81 mg oral tablet, chewable: 1 tab(s) orally once a day (26 Jul 2021 21:55)  atorvastatin 40 mg oral tablet: 1 tab(s) orally once a day (26 Jul 2021 21:55)  Eliquis 2.5 mg oral tablet: orally once a day (26 Jul 2021 21:55)  Entresto 24 mg-26 mg oral tablet: 1 tab(s) orally 2 times a day (26 Jul 2021 21:55)  fenofibrate 160 mg oral tablet: 1 tab(s) orally once a day (26 Jul 2021 21:55)  Flomax 0.4 mg oral capsule: 1 cap(s) orally once a day (26 Jul 2021 21:55)  folic acid 1 mg oral tablet: 1 tab(s) orally once a day (26 Jul 2021 21:55)  furosemide 40 mg oral tablet: 1 tab(s) orally once a day (07 Mar 2023 17:45)  Lantus: 20 unit(s) subcutaneous once a day (at bedtime) (07 Mar 2023 17:45)  levothyroxine 200 mcg (0.2 mg) oral tablet: 1 tab(s) orally once a day (26 Jul 2021 21:55)  metoprolol tartrate 25 mg oral tablet: orally once a day (26 Jul 2021 21:55)  metoprolol tartrate 50 mg oral tablet: orally once a day (at bedtime) (26 Jul 2021 21:55)  omeprazole 40 mg oral delayed release capsule: 1 cap(s) orally once a day (07 Mar 2023 17:48)  Ranexa 500 mg oral tablet, extended release: 1 tab(s) orally 2 times a day (26 Jul 2021 21:55)      .  Tobacco use:   EtOH use:  Illicit drug use:    Living situation:    Allergies:  contrast media (iodine-based) (Other)      FAMILY HISTORY:  Family history of sinus bradycardia (Mother)        Hospital Medications:  acetaminophen     Tablet .. 650 milliGRAM(s) Oral every 6 hours PRN  albuterol    0.083% 2.5 milliGRAM(s) Nebulizer every 6 hours PRN  albuterol/ipratropium for Nebulization. 3 milliLiter(s) Nebulizer once  aspirin  chewable 81 milliGRAM(s) Oral daily  atorvastatin 40 milliGRAM(s) Oral at bedtime  azithromycin  IVPB 500 milliGRAM(s) IV Intermittent every 24 hours  azithromycin  IVPB      buMETAnide Infusion 2 mG/Hr IV Continuous <Continuous>  cefTRIAXone   IVPB 1000 milliGRAM(s) IV Intermittent every 24 hours  cefTRIAXone   IVPB      chlorhexidine 2% Cloths 1 Application(s) Topical daily  guaifenesin/dextromethorphan Oral Liquid 10 milliLiter(s) Oral three times a day PRN  heparin   Injectable 6500 Unit(s) IV Push every 6 hours PRN  heparin   Injectable 3000 Unit(s) IV Push every 6 hours PRN  heparin  Infusion.  Unit(s)/Hr IV Continuous <Continuous>  insulin glargine Injectable (LANTUS) 20 Unit(s) SubCutaneous at bedtime  insulin lispro (ADMELOG) corrective regimen sliding scale   SubCutaneous three times a day before meals  insulin lispro Injectable (ADMELOG) 5 Unit(s) SubCutaneous three times a day before meals  levothyroxine 200 MICROGram(s) Oral daily  metoprolol tartrate 25 milliGRAM(s) Oral daily  metoprolol tartrate 50 milliGRAM(s) Oral at bedtime  pantoprazole    Tablet 40 milliGRAM(s) Oral before breakfast  ranolazine 500 milliGRAM(s) Oral two times a day  sacubitril 49 mG/valsartan 51 mG 1 Tablet(s) Oral two times a day  sodium chloride 3%. 150 milliLiter(s) IV Continuous <Continuous>  sodium chloride 3%. 150 milliLiter(s) IV Continuous <Continuous>  sodium chloride 3%. 150 milliLiter(s) IV Continuous <Continuous>  sodium chloride 3%. 150 milliLiter(s) IV Continuous <Continuous>  sodium chloride 3%. 150 milliLiter(s) IV Continuous <Continuous>  tamsulosin 0.4 milliGRAM(s) Oral daily      Vital Signs Last 24 Hrs  T(C): 36.2 (13 Mar 2023 07:39), Max: 36.4 (12 Mar 2023 16:00)  T(F): 97.2 (13 Mar 2023 07:39), Max: 97.5 (12 Mar 2023 16:00)  HR: 86 (13 Mar 2023 12:14) (67 - 93)  BP: 102/61 (13 Mar 2023 12:14) (91/53 - 130/75)  BP(mean): 76 (13 Mar 2023 12:14) (66 - 96)  RR: 23 (13 Mar 2023 10:00) (16 - 24)  SpO2: 95% (13 Mar 2023 12:14) (90% - 97%)    Parameters below as of 13 Mar 2023 07:39  Patient On (Oxygen Delivery Method): nasal cannula  O2 Flow (L/min): 4      I&O's Summary    12 Mar 2023 07:01  -  13 Mar 2023 07:00  --------------------------------------------------------  IN: 1458 mL / OUT: 2250 mL / NET: -792 mL    13 Mar 2023 07:01  -  13 Mar 2023 15:36  --------------------------------------------------------  IN: 440 mL / OUT: 450 mL / NET: -10 mL        LABS:                        13.4   11.33 )-----------( 373      ( 13 Mar 2023 05:57 )             43.4       03-13    141  |  96<L>  |  58<H>  ----------------------------<  120<H>  3.9   |  31  |  2.2<H>    Ca    9.1      13 Mar 2023 05:57  Mg     2.2     03-13    TPro  5.4<L>  /  Alb  2.5<L>  /  TBili  0.3  /  DBili  x   /  AST  24  /  ALT  16  /  AlkPhos  124<H>  03-13              Culture - Blood (collected 10 Mar 2023 22:00)  Source: .Blood Blood-Venous  Preliminary Report (12 Mar 2023 03:01):    No growth to date.        PHYSICAL EXAM:  GENERAL: NAD, lying in bed comfortably  HEAD:  Atraumatic, Normocephalic  EYES: EOMI, PERRLA, conjunctiva and sclera clear  ENT: Moist mucous membranes  NECK: Supple, No JVD  CHEST/LUNG: Clear to auscultation bilaterally; No rales, rhonchi, wheezing, or rubs. Unlabored respirations  HEART: Regular rate and rhythm; No murmurs, rubs, or gallops  ABDOMEN: Bowel sounds present; Soft, Nontender, Nondistended. No hepatomegally  EXTREMITIES:  2+ Peripheral Pulses, brisk capillary refill. No clubbing, cyanosis, or edema  NERVOUS SYSTEM:  Alert & Oriented X3, speech clear. No deficits   MSK: FROM all 4 extremities, full and equal strength  SKIN: No rashes or lesions    IMAGES:

## 2023-03-13 NOTE — PHYSICAL THERAPY INITIAL EVALUATION ADULT - SPECIFY REASON(S)
EMR reviewed. pt is currently without activity orders. PT evaluation will be completed at the next available session when pt is medically cleared. will follow.

## 2023-03-13 NOTE — PHYSICAL THERAPY INITIAL EVALUATION ADULT - GENERAL OBSERVATIONS, REHAB EVAL
[id number] pm. 76 y/o M received in b/s chair, left in bed, nad, + tele, 3L O2, Heparin IV. pt is A+Ox4 in English, following VC's. transferred with mod A, ambulated 25 ft with RW, CG, 95% on 3L O2. vitals post amb 102/61 91 94% on 3L O2.

## 2023-03-14 LAB
ALBUMIN SERPL ELPH-MCNC: 2.6 G/DL — LOW (ref 3.5–5.2)
ALP SERPL-CCNC: 117 U/L — HIGH (ref 30–115)
ALT FLD-CCNC: 15 U/L — SIGNIFICANT CHANGE UP (ref 0–41)
ANION GAP SERPL CALC-SCNC: 12 MMOL/L — SIGNIFICANT CHANGE UP (ref 7–14)
ANISOCYTOSIS BLD QL: SLIGHT — SIGNIFICANT CHANGE UP
APTT BLD: 60.9 SEC — HIGH (ref 27–39.2)
APTT BLD: >200 SEC — CRITICAL HIGH (ref 27–39.2)
AST SERPL-CCNC: 26 U/L — SIGNIFICANT CHANGE UP (ref 0–41)
BASOPHILS # BLD AUTO: 0 K/UL — SIGNIFICANT CHANGE UP (ref 0–0.2)
BASOPHILS NFR BLD AUTO: 0 % — SIGNIFICANT CHANGE UP (ref 0–1)
BILIRUB SERPL-MCNC: 0.3 MG/DL — SIGNIFICANT CHANGE UP (ref 0.2–1.2)
BUN SERPL-MCNC: 67 MG/DL — CRITICAL HIGH (ref 10–20)
CALCIUM SERPL-MCNC: 9 MG/DL — SIGNIFICANT CHANGE UP (ref 8.4–10.5)
CHLORIDE SERPL-SCNC: 98 MMOL/L — SIGNIFICANT CHANGE UP (ref 98–110)
CO2 SERPL-SCNC: 31 MMOL/L — SIGNIFICANT CHANGE UP (ref 17–32)
CREAT SERPL-MCNC: 2.3 MG/DL — HIGH (ref 0.7–1.5)
CULTURE RESULTS: SIGNIFICANT CHANGE UP
EGFR: 29 ML/MIN/1.73M2 — LOW
EOSINOPHIL # BLD AUTO: 0.51 K/UL — SIGNIFICANT CHANGE UP (ref 0–0.7)
EOSINOPHIL NFR BLD AUTO: 4 % — SIGNIFICANT CHANGE UP (ref 0–8)
GLUCOSE BLDC GLUCOMTR-MCNC: 113 MG/DL — HIGH (ref 70–99)
GLUCOSE BLDC GLUCOMTR-MCNC: 140 MG/DL — HIGH (ref 70–99)
GLUCOSE BLDC GLUCOMTR-MCNC: 141 MG/DL — HIGH (ref 70–99)
GLUCOSE BLDC GLUCOMTR-MCNC: 145 MG/DL — HIGH (ref 70–99)
GLUCOSE SERPL-MCNC: 121 MG/DL — HIGH (ref 70–99)
HCT VFR BLD CALC: 40.5 % — LOW (ref 42–52)
HGB BLD-MCNC: 13 G/DL — LOW (ref 14–18)
LYMPHOCYTES # BLD AUTO: 24 % — SIGNIFICANT CHANGE UP (ref 20.5–51.1)
LYMPHOCYTES # BLD AUTO: 3.05 K/UL — SIGNIFICANT CHANGE UP (ref 1.2–3.4)
MAGNESIUM SERPL-MCNC: 2.1 MG/DL — SIGNIFICANT CHANGE UP (ref 1.8–2.4)
MANUAL SMEAR VERIFICATION: SIGNIFICANT CHANGE UP
MCHC RBC-ENTMCNC: 26.6 PG — LOW (ref 27–31)
MCHC RBC-ENTMCNC: 32.1 G/DL — SIGNIFICANT CHANGE UP (ref 32–37)
MCV RBC AUTO: 83 FL — SIGNIFICANT CHANGE UP (ref 80–94)
MONOCYTES # BLD AUTO: 0.51 K/UL — SIGNIFICANT CHANGE UP (ref 0.1–0.6)
MONOCYTES NFR BLD AUTO: 4 % — SIGNIFICANT CHANGE UP (ref 1.7–9.3)
NEUTROPHILS # BLD AUTO: 8.63 K/UL — HIGH (ref 1.4–6.5)
NEUTROPHILS NFR BLD AUTO: 68 % — SIGNIFICANT CHANGE UP (ref 42.2–75.2)
NRBC # BLD: 0 /100 — SIGNIFICANT CHANGE UP (ref 0–0)
NRBC # BLD: SIGNIFICANT CHANGE UP /100 WBCS (ref 0–0)
PLAT MORPH BLD: NORMAL — SIGNIFICANT CHANGE UP
PLATELET # BLD AUTO: 354 K/UL — SIGNIFICANT CHANGE UP (ref 130–400)
POTASSIUM SERPL-MCNC: 3.7 MMOL/L — SIGNIFICANT CHANGE UP (ref 3.5–5)
POTASSIUM SERPL-SCNC: 3.7 MMOL/L — SIGNIFICANT CHANGE UP (ref 3.5–5)
PROT SERPL-MCNC: 5.2 G/DL — LOW (ref 6–8)
RBC # BLD: 4.88 M/UL — SIGNIFICANT CHANGE UP (ref 4.7–6.1)
RBC # FLD: 14.6 % — HIGH (ref 11.5–14.5)
RBC BLD AUTO: ABNORMAL
SODIUM SERPL-SCNC: 141 MMOL/L — SIGNIFICANT CHANGE UP (ref 135–146)
SPECIMEN SOURCE: SIGNIFICANT CHANGE UP
WBC # BLD: 12.69 K/UL — HIGH (ref 4.8–10.8)
WBC # FLD AUTO: 12.69 K/UL — HIGH (ref 4.8–10.8)

## 2023-03-14 PROCEDURE — 99232 SBSQ HOSP IP/OBS MODERATE 35: CPT

## 2023-03-14 PROCEDURE — 71045 X-RAY EXAM CHEST 1 VIEW: CPT | Mod: 26

## 2023-03-14 PROCEDURE — 99223 1ST HOSP IP/OBS HIGH 75: CPT

## 2023-03-14 PROCEDURE — 99233 SBSQ HOSP IP/OBS HIGH 50: CPT | Mod: 25

## 2023-03-14 RX ORDER — SODIUM CHLORIDE 5 G/100ML
150 INJECTION, SOLUTION INTRAVENOUS
Refills: 0 | Status: DISCONTINUED | OUTPATIENT
Start: 2023-03-14 | End: 2023-03-14

## 2023-03-14 RX ORDER — BUMETANIDE 0.25 MG/ML
2 INJECTION INTRAMUSCULAR; INTRAVENOUS
Refills: 0 | Status: DISCONTINUED | OUTPATIENT
Start: 2023-03-14 | End: 2023-03-14

## 2023-03-14 RX ORDER — APIXABAN 2.5 MG/1
5 TABLET, FILM COATED ORAL EVERY 12 HOURS
Refills: 0 | Status: DISCONTINUED | OUTPATIENT
Start: 2023-03-14 | End: 2023-03-17

## 2023-03-14 RX ADMIN — INSULIN GLARGINE 20 UNIT(S): 100 INJECTION, SOLUTION SUBCUTANEOUS at 21:22

## 2023-03-14 RX ADMIN — CHLORHEXIDINE GLUCONATE 1 APPLICATION(S): 213 SOLUTION TOPICAL at 11:36

## 2023-03-14 RX ADMIN — HEPARIN SODIUM 1000 UNIT(S)/HR: 5000 INJECTION INTRAVENOUS; SUBCUTANEOUS at 04:55

## 2023-03-14 RX ADMIN — BUMETANIDE 2 MILLIGRAM(S): 0.25 INJECTION INTRAMUSCULAR; INTRAVENOUS at 17:25

## 2023-03-14 RX ADMIN — ATORVASTATIN CALCIUM 40 MILLIGRAM(S): 80 TABLET, FILM COATED ORAL at 21:23

## 2023-03-14 RX ADMIN — TAMSULOSIN HYDROCHLORIDE 0.4 MILLIGRAM(S): 0.4 CAPSULE ORAL at 11:35

## 2023-03-14 RX ADMIN — CEFTRIAXONE 100 MILLIGRAM(S): 500 INJECTION, POWDER, FOR SOLUTION INTRAMUSCULAR; INTRAVENOUS at 11:35

## 2023-03-14 RX ADMIN — SACUBITRIL AND VALSARTAN 1 TABLET(S): 24; 26 TABLET, FILM COATED ORAL at 06:06

## 2023-03-14 RX ADMIN — Medication 25 MILLIGRAM(S): at 06:06

## 2023-03-14 RX ADMIN — Medication 5 UNIT(S): at 08:55

## 2023-03-14 RX ADMIN — APIXABAN 5 MILLIGRAM(S): 2.5 TABLET, FILM COATED ORAL at 17:25

## 2023-03-14 RX ADMIN — Medication 81 MILLIGRAM(S): at 11:35

## 2023-03-14 RX ADMIN — AZITHROMYCIN 255 MILLIGRAM(S): 500 TABLET, FILM COATED ORAL at 11:35

## 2023-03-14 RX ADMIN — HEPARIN SODIUM 0 UNIT(S)/HR: 5000 INJECTION INTRAVENOUS; SUBCUTANEOUS at 03:54

## 2023-03-14 RX ADMIN — HEPARIN SODIUM 1000 UNIT(S)/HR: 5000 INJECTION INTRAVENOUS; SUBCUTANEOUS at 12:09

## 2023-03-14 RX ADMIN — RANOLAZINE 500 MILLIGRAM(S): 500 TABLET, FILM COATED, EXTENDED RELEASE ORAL at 06:06

## 2023-03-14 RX ADMIN — Medication 50 MILLIGRAM(S): at 21:23

## 2023-03-14 RX ADMIN — Medication 200 MICROGRAM(S): at 06:06

## 2023-03-14 RX ADMIN — PANTOPRAZOLE SODIUM 40 MILLIGRAM(S): 20 TABLET, DELAYED RELEASE ORAL at 06:06

## 2023-03-14 RX ADMIN — RANOLAZINE 500 MILLIGRAM(S): 500 TABLET, FILM COATED, EXTENDED RELEASE ORAL at 17:25

## 2023-03-14 RX ADMIN — Medication 5 UNIT(S): at 17:25

## 2023-03-14 RX ADMIN — SACUBITRIL AND VALSARTAN 1 TABLET(S): 24; 26 TABLET, FILM COATED ORAL at 17:25

## 2023-03-14 RX ADMIN — Medication 5 UNIT(S): at 11:36

## 2023-03-14 NOTE — CONSULT NOTE ADULT - ASSESSMENT
Assessment: 77 year old M known HFrEF (EF 25% in 2021) s/p CRT-D, paroxysmal Afib on eliquis, CAD s/p CABG in 2016, Bullous pemphigoid, Hypertension, dyslipidemia Present to the ED for shortness of breath and chest pain and found to have decompensated Heart failure.     Plan:  Patient is euvolemic on exam  POCUS exam: IVC flat  Hold all diuretics for now  Get BMP daily with magnesium   Maintain potassium >4.0, Mg >2.2  Strict intake and output  Daily standing weight   PT / OOB as tolerated   Obtain Iron profile  Will continue to follow Assessment: 77 year old M known HFrEF (EF 25% in 2021) s/p CRT-D, paroxysmal Afib on eliquis, CAD s/p CABG in 2016, Bullous pemphigoid, Hypertension, dyslipidemia Present to the ED for shortness of breath and chest pain and found to have decompensated Heart failure.     Plan:    Patient is euvolemic on exam  POCUS exam: IVC flat  Hold all diuretics for now  He might benefit from gentle volume resuscitation due to OVI / would consider albumin 25% 200 ml   Continue Entresto 49-51 mg BID - consider increasing to  mg BID once BP better   I would change metoprolol to 37.5 mg TID  Get BMP daily with magnesium   Maintain potassium >4.0, Mg >2.2  Strict intake and output  Daily standing weight   PT / OOB as tolerated   Obtain Iron profile  Will continue to follow

## 2023-03-14 NOTE — PROGRESS NOTE ADULT - SUBJECTIVE AND OBJECTIVE BOX
Patient is a 77y old  Male who presents with a chief complaint of sob (14 Mar 2023 13:01)        Over Night Events:    On 3LPM   Spo2 acceptable   Able to lay flat with no issues           ROS:  See HPI    PHYSICAL EXAM    ICU Vital Signs Last 24 Hrs  T(C): 36.3 (14 Mar 2023 12:00), Max: 36.3 (13 Mar 2023 20:00)  T(F): 97.3 (14 Mar 2023 12:00), Max: 97.4 (14 Mar 2023 00:39)  HR: 81 (14 Mar 2023 12:00) (66 - 95)  BP: 122/73 (14 Mar 2023 12:00) (93/62 - 134/73)  BP(mean): 93 (14 Mar 2023 12:00) (73 - 96)  ABP: --  ABP(mean): --  RR: 20 (14 Mar 2023 12:00) (13 - 29)  SpO2: 95% (14 Mar 2023 12:00) (91% - 97%)    O2 Parameters below as of 14 Mar 2023 12:00  Patient On (Oxygen Delivery Method): nasal cannula  O2 Flow (L/min): 3          General: NAD   HEENT: LAURO             Lymphatic system: No cervical LN   Lungs: Bilateral BS, clear   Cardiovascular: Regular   Gastrointestinal: Soft, Positive BS  Extremities: No clubbing.  Moves extremities.  Full Range of motion   Skin: Warm, intact  Neurological: No motor or sensory deficit       03-13-23 @ 07:01  -  03-14-23 @ 07:00  --------------------------------------------------------  IN:    Bumetanide: 110 mL    Heparin Infusion: 120 mL    IV PiggyBack: 50 mL    IV PiggyBack: 250 mL    Oral Fluid: 440 mL  Total IN: 970 mL    OUT:    Voided (mL): 1950 mL  Total OUT: 1950 mL    Total NET: -980 mL      03-14-23 @ 07:01  -  03-14-23 @ 15:44  --------------------------------------------------------  IN:    Bumetanide: 40 mL    Heparin Infusion: 70 mL    IV PiggyBack: 50 mL    IV PiggyBack: 250 mL    Oral Fluid: 430 mL  Total IN: 840 mL    OUT:    Voided (mL): 500 mL  Total OUT: 500 mL    Total NET: 340 mL          LABS:                            13.0   12.69 )-----------( 354      ( 14 Mar 2023 05:59 )             40.5                                               03-14    141  |  98  |  67<HH>  ----------------------------<  121<H>  3.7   |  31  |  2.3<H>    Ca    9.0      14 Mar 2023 05:59  Mg     2.1     03-14    TPro  5.2<L>  /  Alb  2.6<L>  /  TBili  0.3  /  DBili  x   /  AST  26  /  ALT  15  /  AlkPhos  117<H>  03-14      PTT - ( 14 Mar 2023 11:05 )  PTT:60.9 sec                                                                                     LIVER FUNCTIONS - ( 14 Mar 2023 05:59 )  Alb: 2.6 g/dL / Pro: 5.2 g/dL / ALK PHOS: 117 U/L / ALT: 15 U/L / AST: 26 U/L / GGT: x                                                                                                                                       MEDICATIONS  (STANDING):  albuterol/ipratropium for Nebulization. 3 milliLiter(s) Nebulizer once  apixaban 5 milliGRAM(s) Oral every 12 hours  aspirin  chewable 81 milliGRAM(s) Oral daily  atorvastatin 40 milliGRAM(s) Oral at bedtime  azithromycin  IVPB 500 milliGRAM(s) IV Intermittent every 24 hours  azithromycin  IVPB      buMETAnide Injectable 2 milliGRAM(s) IV Push two times a day  chlorhexidine 2% Cloths 1 Application(s) Topical daily  heparin  Infusion.  Unit(s)/Hr (15 mL/Hr) IV Continuous <Continuous>  insulin glargine Injectable (LANTUS) 20 Unit(s) SubCutaneous at bedtime  insulin lispro (ADMELOG) corrective regimen sliding scale   SubCutaneous three times a day before meals  insulin lispro Injectable (ADMELOG) 5 Unit(s) SubCutaneous three times a day before meals  levothyroxine 200 MICROGram(s) Oral daily  metoprolol tartrate 25 milliGRAM(s) Oral daily  metoprolol tartrate 50 milliGRAM(s) Oral at bedtime  pantoprazole    Tablet 40 milliGRAM(s) Oral before breakfast  ranolazine 500 milliGRAM(s) Oral two times a day  sacubitril 49 mG/valsartan 51 mG 1 Tablet(s) Oral two times a day  tamsulosin 0.4 milliGRAM(s) Oral daily    MEDICATIONS  (PRN):  acetaminophen     Tablet .. 650 milliGRAM(s) Oral every 6 hours PRN Temp greater or equal to 38C (100.4F), Mild Pain (1 - 3)  albuterol    0.083% 2.5 milliGRAM(s) Nebulizer every 6 hours PRN Shortness of Breath and/or Wheezing  guaifenesin/dextromethorphan Oral Liquid 10 milliLiter(s) Oral three times a day PRN Cough  heparin   Injectable 6500 Unit(s) IV Push every 6 hours PRN For aPTT less than 40  heparin   Injectable 3000 Unit(s) IV Push every 6 hours PRN For aPTT between 40 - 57      Xrays: No new infiltrates                                                                                    ECHO

## 2023-03-14 NOTE — PROGRESS NOTE ADULT - SUBJECTIVE AND OBJECTIVE BOX
Patient is a 77y old  Male who presents with a chief complaint of sob (13 Mar 2023 15:36)      INTERVAL HPI/OVERNIGHT EVENTS:   No overnight events   Afebrile, hemodynamically stable     ICU Vital Signs Last 24 Hrs  T(C): 36.3 (14 Mar 2023 08:00), Max: 36.3 (13 Mar 2023 20:00)  T(F): 97.3 (14 Mar 2023 08:00), Max: 97.4 (14 Mar 2023 00:39)  HR: 67 (14 Mar 2023 08:00) (66 - 95)  BP: 111/69 (14 Mar 2023 08:00) (93/62 - 134/73)  BP(mean): 83 (14 Mar 2023 08:00) (73 - 96)  ABP: --  ABP(mean): --  RR: 20 (14 Mar 2023 08:00) (13 - 29)  SpO2: 92% (14 Mar 2023 08:00) (91% - 97%)    O2 Parameters below as of 14 Mar 2023 08:00  Patient On (Oxygen Delivery Method): nasal cannula  O2 Flow (L/min): 3        I&O's Summary    13 Mar 2023 07:01  -  14 Mar 2023 07:00  --------------------------------------------------------  IN: 970 mL / OUT: 1950 mL / NET: -980 mL    14 Mar 2023 07:01  -  14 Mar 2023 13:01  --------------------------------------------------------  IN: 610 mL / OUT: 500 mL / NET: 110 mL          LABS:                        13.0   12.69 )-----------( 354      ( 14 Mar 2023 05:59 )             40.5     03-14    141  |  98  |  67<HH>  ----------------------------<  121<H>  3.7   |  31  |  2.3<H>    Ca    9.0      14 Mar 2023 05:59  Mg     2.1     03-14    TPro  5.2<L>  /  Alb  2.6<L>  /  TBili  0.3  /  DBili  x   /  AST  26  /  ALT  15  /  AlkPhos  117<H>  03-14    PTT - ( 14 Mar 2023 11:05 )  PTT:60.9 sec    CAPILLARY BLOOD GLUCOSE      POCT Blood Glucose.: 145 mg/dL (14 Mar 2023 11:30)  POCT Blood Glucose.: 113 mg/dL (14 Mar 2023 08:42)  POCT Blood Glucose.: 158 mg/dL (13 Mar 2023 21:08)  POCT Blood Glucose.: 78 mg/dL (13 Mar 2023 16:54)        RADIOLOGY & ADDITIONAL TESTS:    Consultant(s) Notes Reviewed:  [x ] YES  [ ] NO    MEDICATIONS  (STANDING):  albuterol/ipratropium for Nebulization. 3 milliLiter(s) Nebulizer once  apixaban 5 milliGRAM(s) Oral every 12 hours  aspirin  chewable 81 milliGRAM(s) Oral daily  atorvastatin 40 milliGRAM(s) Oral at bedtime  azithromycin  IVPB 500 milliGRAM(s) IV Intermittent every 24 hours  azithromycin  IVPB      buMETAnide Injectable 2 milliGRAM(s) IV Push two times a day  chlorhexidine 2% Cloths 1 Application(s) Topical daily  heparin  Infusion.  Unit(s)/Hr (15 mL/Hr) IV Continuous <Continuous>  insulin glargine Injectable (LANTUS) 20 Unit(s) SubCutaneous at bedtime  insulin lispro (ADMELOG) corrective regimen sliding scale   SubCutaneous three times a day before meals  insulin lispro Injectable (ADMELOG) 5 Unit(s) SubCutaneous three times a day before meals  levothyroxine 200 MICROGram(s) Oral daily  metoprolol tartrate 25 milliGRAM(s) Oral daily  metoprolol tartrate 50 milliGRAM(s) Oral at bedtime  pantoprazole    Tablet 40 milliGRAM(s) Oral before breakfast  ranolazine 500 milliGRAM(s) Oral two times a day  sacubitril 49 mG/valsartan 51 mG 1 Tablet(s) Oral two times a day  tamsulosin 0.4 milliGRAM(s) Oral daily    MEDICATIONS  (PRN):  acetaminophen     Tablet .. 650 milliGRAM(s) Oral every 6 hours PRN Temp greater or equal to 38C (100.4F), Mild Pain (1 - 3)  albuterol    0.083% 2.5 milliGRAM(s) Nebulizer every 6 hours PRN Shortness of Breath and/or Wheezing  guaifenesin/dextromethorphan Oral Liquid 10 milliLiter(s) Oral three times a day PRN Cough  heparin   Injectable 6500 Unit(s) IV Push every 6 hours PRN For aPTT less than 40  heparin   Injectable 3000 Unit(s) IV Push every 6 hours PRN For aPTT between 40 - 57      PHYSICAL EXAM:  GENERAL:   HEAD:  Atraumatic, Normocephalic  EYES: EOMI, PERRLA, conjunctiva and sclera clear  NECK: Supple, No JVD, Normal thyroid, no enlarged nodes  NERVOUS SYSTEM:  Alert & Awake.   CHEST/LUNG: B/L good air entry; No rales, rhonchi, or wheezing  HEART: S1S2 normal, no S3, Regular rate and rhythm; No murmurs  ABDOMEN: Soft, Nontender, Nondistended; Bowel sounds present  EXTREMITIES:  2+ Peripheral Pulses, No clubbing, cyanosis, or edema  LYMPH: No lymphadenopathy noted  SKIN: No rashes or lesions    Care Discussed with Consultants/Other Providers [ x] YES  [ ] NO

## 2023-03-14 NOTE — PHARMACOTHERAPY INTERVENTION NOTE - COMMENTS
As discussed, discontinued azithromycin order since the patient has received azithromycin for 6 days for the treatment of pleural effusion.    Murali Liang, PharmD, BCIDP  Clinical Pharmacy Specialist, Infectious Diseases  Tele-Antim[facility] Stewardship Program (Tele-[facility])  Tele-[facility] Phone: [phone]

## 2023-03-14 NOTE — CONSULT NOTE ADULT - SUBJECTIVE AND OBJECTIVE BOX
Date of Admission: 3/7/23    CHIEF COMPLAINT: Patient is a 77y old  Male who presents with a chief complaint of sob (14 Mar 2023 15:43)    HISTORY OF PRESENT ILLNESS: 77-year-old male with pmhx of multiple vessel disease s/p four-vessel bypass 6 years ago, DM, HFrEF 25-30% presents to the ED from doctor's office for evaluation of difficulty breathing and possible STEMI.  EMS states their prehospital EKG demonstrated STEMI and case was called in to 911 system for prearrival. In the emergency department the patient complains of worsening dyspnea over the last 4 days, started on Friday with a cough, progressed to PND and orthopnea for several days despite escalation in diuretics. Also reports chest pain on arrival as well. He is followed by cardiologist Dr. Godinez with no recent changes in status. 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  Mother:   Father:   Siblings:     SOCIAL HISTORY:    [ ] Non-smoker  [ ] Smoker  [ ] Alcohol    Allergies  contrast media (iodine-based) (Other)    Intolerances    	    REVIEW OF SYSTEMS:  CONSTITUTIONAL: No fever, weight loss, or fatigue.  CARDIOLOGY: Patient denies chest pain, shortness of breath or syncopal episodes.   RESPIRATORY: denies shortness of breath, wheezing.   NEUROLOGICAL: No weakness, no focal deficits to report.  ENDOCRINOLOGICAL: no recent change in diabetic medications.   GI: no BRBPR, no n/v, diarrhea.    PSYCHIATRY: normal mood and affect  HEENT: no nasal discharge, no ecchymosis  SKIN: no ecchymosis, no breakdown  MUSCULOSKELETAL: Full range of motion x4.     PHYSICAL EXAM:  T(C): 36.7 (03-14-23 @ 16:00), Max: 36.7 (03-14-23 @ 16:00)  HR: 82 (03-14-23 @ 16:00) (66 - 95)  BP: 99/51 (03-14-23 @ 16:00) (99/51 - 134/73)  RR: 20 (03-14-23 @ 12:00) (13 - 29)  SpO2: 93% (03-14-23 @ 16:00) (91% - 97%)  Wt(kg): --  I&O's Summary    13 Mar 2023 07:01  -  14 Mar 2023 07:00  --------------------------------------------------------  IN: 970 mL / OUT: 1950 mL / NET: -980 mL    14 Mar 2023 07:01  -  14 Mar 2023 16:53  --------------------------------------------------------  IN: 840 mL / OUT: 500 mL / NET: 340 mL        General Appearance: well appearing, normal for age and gender. 	  Neck: normal JVP, no bruit.   Eyes: Extra Ocular muscles intact.   Cardiovascular: regular rate and rhythm S1 S2, No JVD, No murmurs, No edema  Respiratory: Lungs clear to auscultation	  Psychiatry: Alert and oriented x 3, Mood & affect appropriate  Gastrointestinal:  Soft, Non-tender  Skin/Integumen: No rashes, No ecchymoses, No cyanosis	  Neurologic: Non-focal  Musculoskeletal/ extremities: Normal range of motion, No clubbing, cyanosis or edema  Vascular: Peripheral pulses palpable 2+ bilaterally    LABS:	 	                          13.0   12.69 )-----------( 354      ( 14 Mar 2023 05:59 )             40.5     03-14    141  |  98  |  67<HH>  ----------------------------<  121<H>  3.7   |  31  |  2.3<H>    Ca    9.0      14 Mar 2023 05:59  Mg     2.1     03-14    TPro  5.2<L>  /  Alb  2.6<L>  /  TBili  0.3  /  DBili  x   /  AST  26  /  ALT  15  /  AlkPhos  117<H>  03-14        PTT - ( 14 Mar 2023 11:05 )  PTT:60.9 sec    CARDIAC MARKERS:  Serum Pro-Brain Natriuretic Peptide: 46141 pg/mL (03.07.23 @ 15:10)      TELEMETRY EVENTS: 	    ECG:  	      PREVIOUS DIAGNOSTIC TESTING:    [ ] Echocardiogram:      [ ]  Catheterization:  [ ] Stress Test:  	  	    Home Medications:  aspirin 81 mg oral tablet, chewable: 1 tab(s) orally once a day (26 Jul 2021 21:55)  atorvastatin 40 mg oral tablet: 1 tab(s) orally once a day (26 Jul 2021 21:55)  Eliquis 2.5 mg oral tablet: orally once a day (26 Jul 2021 21:55)  Entresto 24 mg-26 mg oral tablet: 1 tab(s) orally 2 times a day (26 Jul 2021 21:55)  fenofibrate 160 mg oral tablet: 1 tab(s) orally once a day (26 Jul 2021 21:55)  Flomax 0.4 mg oral capsule: 1 cap(s) orally once a day (26 Jul 2021 21:55)  folic acid 1 mg oral tablet: 1 tab(s) orally once a day (26 Jul 2021 21:55)  furosemide 40 mg oral tablet: 1 tab(s) orally once a day (07 Mar 2023 17:45)  Lantus: 20 unit(s) subcutaneous once a day (at bedtime) (07 Mar 2023 17:45)  levothyroxine 200 mcg (0.2 mg) oral tablet: 1 tab(s) orally once a day (26 Jul 2021 21:55)  metoprolol tartrate 25 mg oral tablet: orally once a day (26 Jul 2021 21:55)  metoprolol tartrate 50 mg oral tablet: orally once a day (at bedtime) (26 Jul 2021 21:55)  omeprazole 40 mg oral delayed release capsule: 1 cap(s) orally once a day (07 Mar 2023 17:48)  Ranexa 500 mg oral tablet, extended release: 1 tab(s) orally 2 times a day (26 Jul 2021 21:55)    MEDICATIONS  (STANDING):  albuterol/ipratropium for Nebulization. 3 milliLiter(s) Nebulizer once  apixaban 5 milliGRAM(s) Oral every 12 hours  aspirin  chewable 81 milliGRAM(s) Oral daily  atorvastatin 40 milliGRAM(s) Oral at bedtime  azithromycin  IVPB 500 milliGRAM(s) IV Intermittent every 24 hours  azithromycin  IVPB      buMETAnide Injectable 2 milliGRAM(s) IV Push two times a day  chlorhexidine 2% Cloths 1 Application(s) Topical daily  insulin glargine Injectable (LANTUS) 20 Unit(s) SubCutaneous at bedtime  insulin lispro (ADMELOG) corrective regimen sliding scale   SubCutaneous three times a day before meals  insulin lispro Injectable (ADMELOG) 5 Unit(s) SubCutaneous three times a day before meals  levothyroxine 200 MICROGram(s) Oral daily  metoprolol tartrate 25 milliGRAM(s) Oral daily  metoprolol tartrate 50 milliGRAM(s) Oral at bedtime  pantoprazole    Tablet 40 milliGRAM(s) Oral before breakfast  ranolazine 500 milliGRAM(s) Oral two times a day  sacubitril 49 mG/valsartan 51 mG 1 Tablet(s) Oral two times a day  tamsulosin 0.4 milliGRAM(s) Oral daily    MEDICATIONS  (PRN):  acetaminophen     Tablet .. 650 milliGRAM(s) Oral every 6 hours PRN Temp greater or equal to 38C (100.4F), Mild Pain (1 - 3)  albuterol    0.083% 2.5 milliGRAM(s) Nebulizer every 6 hours PRN Shortness of Breath and/or Wheezing  guaifenesin/dextromethorphan Oral Liquid 10 milliLiter(s) Oral three times a day PRN Cough         Date of Admission: 3/7/23    CHIEF COMPLAINT: Patient is a 77y old  Male who presents with a chief complaint of sob (14 Mar 2023 15:43)    HISTORY OF PRESENT ILLNESS: 77-year-old male with pmhx of multiple vessel disease s/p four-vessel bypass 6 years ago, DM, HFrEF 25-30% presents to the ED from doctor's office for evaluation of difficulty breathing and possible STEMI.  EMS states their prehospital EKG demonstrated STEMI and case was called in to 911 system for prearrival. In the emergency department the patient complains of worsening dyspnea over the last 4 days, started on Friday with a cough, progressed to PND and orthopnea for several days despite escalation in diuretics. Also reports chest pain on arrival as well. He is followed by cardiologist Dr. Godinez with no recent changes in status. (07 Mar 2023 17:35)    Patient with spouse at bedside. Reports having mild baseline SOB, but progressively worsened- prompting this admission. Endorses compliance with home medications and a low Na diet. Follows w/ Dr. Godinez outpatient.    Patient's spouse also noted patient has had a substantial unintended weight loss over the past year which has yet to be investigated.     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  Mother:   Father:   Siblings:       SOCIAL HISTORY:  Former light smoker, quit many years ago.     Allergies  contrast media (iodine-based) (Other)    Intolerances    	    REVIEW OF SYSTEMS:  CONSTITUTIONAL: No fever, +substantial unintended weight loss, + fatigue.  CARDIOLOGY: Patient denies chest pain, + shortness of breath on exertion, +pre-syncopal episodes.   RESPIRATORY: + shortness of breath on exertion, no wheezing.   NEUROLOGICAL: +generalized weakness, no focal deficits to report.  ENDOCRINOLOGICAL: no recent change in diabetic medications.   GI: no BRBPR, no n/v, diarrhea.    PSYCHIATRY: normal mood and affect  HEENT: no nasal discharge, no ecchymosis  SKIN: no ecchymosis, no breakdown  MUSCULOSKELETAL: Full range of motion x4.         PHYSICAL EXAM:  T(C): 36.7 (03-14-23 @ 16:00), Max: 36.7 (03-14-23 @ 16:00)  HR: 82 (03-14-23 @ 16:00) (66 - 95)  BP: 99/51 (03-14-23 @ 16:00) (99/51 - 134/73)  RR: 20 (03-14-23 @ 12:00) (13 - 29)  SpO2: 93% (03-14-23 @ 16:00) (91% - 97%)  Wt(kg): --  I&O's Summary    13 Mar 2023 07:01  -  14 Mar 2023 07:00  --------------------------------------------------------  IN: 970 mL / OUT: 1950 mL / NET: -980 mL    14 Mar 2023 07:01  -  14 Mar 2023 16:53  --------------------------------------------------------  IN: 840 mL / OUT: 500 mL / NET: 340 mL        General Appearance: normal for age and gender. 	  Neck: normal JVP   Eyes: Extra Ocular muscles intact.   Cardiovascular: regular rate, S1 S2, No edema  Respiratory: Coarse	  Psychiatry: Alert and oriented x 3, Mood & affect appropriate  Gastrointestinal:  Soft, Non-tender  Skin/Integumentary: No rashes, No ecchymoses, No cyanosis	  Neurologic: Non-focal  Musculoskeletal/ extremities: Normal range of motion, No clubbing, cyanosis or edema  Vascular: Peripheral pulses palpable 2+ bilaterally      LABS:	 	                          13.0   12.69 )-----------( 354      ( 14 Mar 2023 05:59 )             40.5     03-14    141  |  98  |  67<HH>  ----------------------------<  121<H>  3.7   |  31  |  2.3<H>    Ca    9.0      14 Mar 2023 05:59  Mg     2.1     03-14    TPro  5.2<L>  /  Alb  2.6<L>  /  TBili  0.3  /  DBili  x   /  AST  26  /  ALT  15  /  AlkPhos  117<H>  03-14        PTT - ( 14 Mar 2023 11:05 )  PTT:60.9 sec    CARDIAC MARKERS:  Serum Pro-Brain Natriuretic Peptide: 71762 pg/mL (03.07.23 @ 15:10)      TELEMETRY EVENTS: 	    ECG:  	3/07/23    Ventricular Rate 104 BPM  Atrial Rate 104 BPM  QRS Duration 126 ms  Q-T Interval 398 ms  QTC Calculation(Bazett) 523 ms  R Axis 136 degrees  T Axis -9 degrees    Diagnosis Line Ventricular-paced rhythm with occasional Premature ventricular complexes  Abnormal ECG    Confirmed by JOANIE NICOLE MD ([id number]) on 3/8/2023 7:05:34 AM          PREVIOUS DIAGNOSTIC TESTING:    TTE 3/09/23      Summary:   1. Severely decreased global left ventricular systolic function.   2. LV Ejection Fraction by German's Method with a biplane EF of 16 %.   3. Elevated mean left atrial pressure.   4. Spectral Doppler shows pseudonormal pattern of left ventricular   myocardial filling (Grade II diastolic dysfunction).   5. Mildly enlarged right atrium.   6. Moderately enlarged left atrium.   7. Degenerative mitral valve.  8. Mitral annular calcification.   9. Thickening of the anterior and posterior mitral valve leaflets.  10. Trace mitral valve regurgitation.  11. Mild tricuspid regurgitation.  12. Moderate aortic valve thickening with normal leaflet opening.  13. Estimated pulmonary artery systolic pressure is 42.9 mmHg assuming a   right atrial pressure of 3 mmHg, which is consistent with mild pulmonary   hypertension.  14. Endocardial visualization was enhanced with intravenous echo contrast.      Home Medications:  aspirin 81 mg oral tablet, chewable: 1 tab(s) orally once a day (26 Jul 2021 21:55)  atorvastatin 40 mg oral tablet: 1 tab(s) orally once a day (26 Jul 2021 21:55)  Eliquis 2.5 mg oral tablet: orally once a day (26 Jul 2021 21:55)  Entresto 24 mg-26 mg oral tablet: 1 tab(s) orally 2 times a day (26 Jul 2021 21:55)  fenofibrate 160 mg oral tablet: 1 tab(s) orally once a day (26 Jul 2021 21:55)  Flomax 0.4 mg oral capsule: 1 cap(s) orally once a day (26 Jul 2021 21:55)  folic acid 1 mg oral tablet: 1 tab(s) orally once a day (26 Jul 2021 21:55)  furosemide 40 mg oral tablet: 1 tab(s) orally once a day (07 Mar 2023 17:45)  Lantus: 20 unit(s) subcutaneous once a day (at bedtime) (07 Mar 2023 17:45)  levothyroxine 200 mcg (0.2 mg) oral tablet: 1 tab(s) orally once a day (26 Jul 2021 21:55)  metoprolol tartrate 25 mg oral tablet: orally once a day (26 Jul 2021 21:55)  metoprolol tartrate 50 mg oral tablet: orally once a day (at bedtime) (26 Jul 2021 21:55)  omeprazole 40 mg oral delayed release capsule: 1 cap(s) orally once a day (07 Mar 2023 17:48)  Ranexa 500 mg oral tablet, extended release: 1 tab(s) orally 2 times a day (26 Jul 2021 21:55)    MEDICATIONS  (STANDING):  albuterol/ipratropium for Nebulization. 3 milliLiter(s) Nebulizer once  apixaban 5 milliGRAM(s) Oral every 12 hours  aspirin  chewable 81 milliGRAM(s) Oral daily  atorvastatin 40 milliGRAM(s) Oral at bedtime  azithromycin  IVPB 500 milliGRAM(s) IV Intermittent every 24 hours  azithromycin  IVPB      buMETAnide Injectable 2 milliGRAM(s) IV Push two times a day  chlorhexidine 2% Cloths 1 Application(s) Topical daily  insulin glargine Injectable (LANTUS) 20 Unit(s) SubCutaneous at bedtime  insulin lispro (ADMELOG) corrective regimen sliding scale   SubCutaneous three times a day before meals  insulin lispro Injectable (ADMELOG) 5 Unit(s) SubCutaneous three times a day before meals  levothyroxine 200 MICROGram(s) Oral daily  metoprolol tartrate 25 milliGRAM(s) Oral daily  metoprolol tartrate 50 milliGRAM(s) Oral at bedtime  pantoprazole    Tablet 40 milliGRAM(s) Oral before breakfast  ranolazine 500 milliGRAM(s) Oral two times a day  sacubitril 49 mG/valsartan 51 mG 1 Tablet(s) Oral two times a day  tamsulosin 0.4 milliGRAM(s) Oral daily    MEDICATIONS  (PRN):  acetaminophen     Tablet .. 650 milliGRAM(s) Oral every 6 hours PRN Temp greater or equal to 38C (100.4F), Mild Pain (1 - 3)  albuterol    0.083% 2.5 milliGRAM(s) Nebulizer every 6 hours PRN Shortness of Breath and/or Wheezing  guaifenesin/dextromethorphan Oral Liquid 10 milliLiter(s) Oral three times a day PRN Cough

## 2023-03-14 NOTE — PROGRESS NOTE ADULT - ASSESSMENT
77 year old M known HFrEF (EF 25% in 2021) s/p CRT-D, paroxysmal Afib on eliquis, CAD s/p CABG in 2016, Bullous pemphigoid, Hypertension, dyslipidemia Present to the ED for shortness of breath and chest pain and found to have decompensated Heart failure.    # Decompensated HFrEF   # Worsening LV dysfunction  # Right pleural effusion   - target -150ml/hr  - Swithced to bumex 2 mg BID   - s/p Metolazone 5mg x 1  - c/w Entresto 49/51   - c/w metoprolol 50mg and 25 mg QD  - may need aldactone +/- Farxiga   - monitor BMP   - strict I&Os  - Thoracentesis 3/9: 1L transudative fluid removed. Heparin restarted at 4am 3/10   - c/w abx and follow up sputum culture. Abx finished course of azithro and rocephin. Pleural fluid cx negative  - 03/13: Patient had a run of [employer], consulted with EP regarding this as he has CRT-D. NTD    - uric acid 10.2   - Echo : GIID, 16% EF     # Chest pain   - mildly elevated troponin likely type II  - c/w aspirin  - c/w atorvastatin  - c/w metoprolol and ranolazine  - outpatient f/u for ischemic w/up    #Paroxysmal A.fib  - switch from heparin back to eliquis 5 mg BID   - c/w metoprolol    #DM  - c/w insulin   - targer -180    #hypethyroid  - c/w levothyroxine    DVT ppx: Heparin Drip   GI ppx: Pantoprazole  #CODE status: DNI ( as per living will according to patient's wife)

## 2023-03-14 NOTE — PROGRESS NOTE ADULT - ASSESSMENT
Impression:    CHF exacerbation  HO CHFrEF  Pulmonary edema  large rt pleural effusion  HO pAfib on AC  Ho CAD  Former smoker of 20 PY       Plan:    CXR noted and is stable; no recurrent effusion  Continue diuresis per cardiology; kidney function relatively stable  Pleural fluid transudate by protein ration; culture negative; cytology pending still   Wean oxygen as tolerated; keep spo2 more than 92%  May need O2 at home; check O@ saturation with ambulation and if less than 88% then will need O2 with portable concentrator during sleep and exercise  Continue albuterol as needed   He did smoke for 20 years in the past; could have obstructive airway disease; outpatient PFTs at some point  On IV heparin and will be switched to Eliquis  Will follow as needed

## 2023-03-15 LAB
ALBUMIN SERPL ELPH-MCNC: 2.6 G/DL — LOW (ref 3.5–5.2)
ALP SERPL-CCNC: 108 U/L — SIGNIFICANT CHANGE UP (ref 30–115)
ALT FLD-CCNC: 17 U/L — SIGNIFICANT CHANGE UP (ref 0–41)
ANION GAP SERPL CALC-SCNC: 13 MMOL/L — SIGNIFICANT CHANGE UP (ref 7–14)
AST SERPL-CCNC: 28 U/L — SIGNIFICANT CHANGE UP (ref 0–41)
BASOPHILS # BLD AUTO: 0.11 K/UL — SIGNIFICANT CHANGE UP (ref 0–0.2)
BASOPHILS NFR BLD AUTO: 0.9 % — SIGNIFICANT CHANGE UP (ref 0–1)
BILIRUB SERPL-MCNC: 0.3 MG/DL — SIGNIFICANT CHANGE UP (ref 0.2–1.2)
BUN SERPL-MCNC: 71 MG/DL — CRITICAL HIGH (ref 10–20)
CALCIUM SERPL-MCNC: 9.1 MG/DL — SIGNIFICANT CHANGE UP (ref 8.4–10.4)
CHLORIDE SERPL-SCNC: 98 MMOL/L — SIGNIFICANT CHANGE UP (ref 98–110)
CO2 SERPL-SCNC: 32 MMOL/L — SIGNIFICANT CHANGE UP (ref 17–32)
CREAT SERPL-MCNC: 2.3 MG/DL — HIGH (ref 0.7–1.5)
EGFR: 29 ML/MIN/1.73M2 — LOW
EOSINOPHIL # BLD AUTO: 1.01 K/UL — HIGH (ref 0–0.7)
EOSINOPHIL NFR BLD AUTO: 8.1 % — HIGH (ref 0–8)
GLUCOSE BLDC GLUCOMTR-MCNC: 144 MG/DL — HIGH (ref 70–99)
GLUCOSE BLDC GLUCOMTR-MCNC: 145 MG/DL — HIGH (ref 70–99)
GLUCOSE BLDC GLUCOMTR-MCNC: 146 MG/DL — HIGH (ref 70–99)
GLUCOSE BLDC GLUCOMTR-MCNC: 96 MG/DL — SIGNIFICANT CHANGE UP (ref 70–99)
GLUCOSE SERPL-MCNC: 102 MG/DL — HIGH (ref 70–99)
HCT VFR BLD CALC: 40.6 % — LOW (ref 42–52)
HGB BLD-MCNC: 13.1 G/DL — LOW (ref 14–18)
IMM GRANULOCYTES NFR BLD AUTO: 2.4 % — HIGH (ref 0.1–0.3)
IRON SATN MFR SERPL: 39 % — SIGNIFICANT CHANGE UP (ref 15–50)
IRON SATN MFR SERPL: 67 UG/DL — SIGNIFICANT CHANGE UP (ref 35–150)
LYMPHOCYTES # BLD AUTO: 2.94 K/UL — SIGNIFICANT CHANGE UP (ref 1.2–3.4)
LYMPHOCYTES # BLD AUTO: 23.5 % — SIGNIFICANT CHANGE UP (ref 20.5–51.1)
MAGNESIUM SERPL-MCNC: 2.1 MG/DL — SIGNIFICANT CHANGE UP (ref 1.8–2.4)
MCHC RBC-ENTMCNC: 27 PG — SIGNIFICANT CHANGE UP (ref 27–31)
MCHC RBC-ENTMCNC: 32.3 G/DL — SIGNIFICANT CHANGE UP (ref 32–37)
MCV RBC AUTO: 83.7 FL — SIGNIFICANT CHANGE UP (ref 80–94)
MONOCYTES # BLD AUTO: 1.11 K/UL — HIGH (ref 0.1–0.6)
MONOCYTES NFR BLD AUTO: 8.9 % — SIGNIFICANT CHANGE UP (ref 1.7–9.3)
NEUTROPHILS # BLD AUTO: 7.06 K/UL — HIGH (ref 1.4–6.5)
NEUTROPHILS NFR BLD AUTO: 56.2 % — SIGNIFICANT CHANGE UP (ref 42.2–75.2)
NRBC # BLD: 0 /100 WBCS — SIGNIFICANT CHANGE UP (ref 0–0)
PLATELET # BLD AUTO: 365 K/UL — SIGNIFICANT CHANGE UP (ref 130–400)
POTASSIUM SERPL-MCNC: 3.7 MMOL/L — SIGNIFICANT CHANGE UP (ref 3.5–5)
POTASSIUM SERPL-SCNC: 3.7 MMOL/L — SIGNIFICANT CHANGE UP (ref 3.5–5)
PROT SERPL-MCNC: 5.3 G/DL — LOW (ref 6–8)
RBC # BLD: 4.85 M/UL — SIGNIFICANT CHANGE UP (ref 4.7–6.1)
RBC # FLD: 14.6 % — HIGH (ref 11.5–14.5)
SODIUM SERPL-SCNC: 143 MMOL/L — SIGNIFICANT CHANGE UP (ref 135–146)
TIBC SERPL-MCNC: 171 UG/DL — LOW (ref 220–430)
TRANSFERRIN SERPL-MCNC: 145 MG/DL — LOW (ref 200–360)
UIBC SERPL-MCNC: 104 UG/DL — LOW (ref 110–370)
WBC # BLD: 12.53 K/UL — HIGH (ref 4.8–10.8)
WBC # FLD AUTO: 12.53 K/UL — HIGH (ref 4.8–10.8)

## 2023-03-15 PROCEDURE — 99232 SBSQ HOSP IP/OBS MODERATE 35: CPT

## 2023-03-15 PROCEDURE — 99233 SBSQ HOSP IP/OBS HIGH 50: CPT | Mod: 25

## 2023-03-15 RX ORDER — METOPROLOL TARTRATE 50 MG
37.5 TABLET ORAL THREE TIMES A DAY
Refills: 0 | Status: DISCONTINUED | OUTPATIENT
Start: 2023-03-15 | End: 2023-03-17

## 2023-03-15 RX ORDER — MAGNESIUM OXIDE 400 MG ORAL TABLET 241.3 MG
400 TABLET ORAL ONCE
Refills: 0 | Status: COMPLETED | OUTPATIENT
Start: 2023-03-15 | End: 2023-03-15

## 2023-03-15 RX ORDER — SODIUM CHLORIDE 9 MG/ML
1000 INJECTION INTRAMUSCULAR; INTRAVENOUS; SUBCUTANEOUS
Refills: 0 | Status: DISCONTINUED | OUTPATIENT
Start: 2023-03-15 | End: 2023-03-16

## 2023-03-15 RX ORDER — POTASSIUM CHLORIDE 20 MEQ
40 PACKET (EA) ORAL ONCE
Refills: 0 | Status: COMPLETED | OUTPATIENT
Start: 2023-03-15 | End: 2023-03-15

## 2023-03-15 RX ORDER — MAGNESIUM SULFATE 500 MG/ML
2 VIAL (ML) INJECTION ONCE
Refills: 0 | Status: DISCONTINUED | OUTPATIENT
Start: 2023-03-15 | End: 2023-03-15

## 2023-03-15 RX ADMIN — MAGNESIUM OXIDE 400 MG ORAL TABLET 400 MILLIGRAM(S): 241.3 TABLET ORAL at 10:12

## 2023-03-15 RX ADMIN — Medication 37.5 MILLIGRAM(S): at 13:19

## 2023-03-15 RX ADMIN — Medication 5 UNIT(S): at 12:13

## 2023-03-15 RX ADMIN — Medication 40 MILLIEQUIVALENT(S): at 09:28

## 2023-03-15 RX ADMIN — PANTOPRAZOLE SODIUM 40 MILLIGRAM(S): 20 TABLET, DELAYED RELEASE ORAL at 05:00

## 2023-03-15 RX ADMIN — Medication 25 MILLIGRAM(S): at 05:00

## 2023-03-15 RX ADMIN — Medication 5 UNIT(S): at 17:54

## 2023-03-15 RX ADMIN — Medication 37.5 MILLIGRAM(S): at 21:33

## 2023-03-15 RX ADMIN — ATORVASTATIN CALCIUM 40 MILLIGRAM(S): 80 TABLET, FILM COATED ORAL at 21:33

## 2023-03-15 RX ADMIN — TAMSULOSIN HYDROCHLORIDE 0.4 MILLIGRAM(S): 0.4 CAPSULE ORAL at 12:12

## 2023-03-15 RX ADMIN — SODIUM CHLORIDE 250 MILLILITER(S): 9 INJECTION INTRAMUSCULAR; INTRAVENOUS; SUBCUTANEOUS at 13:18

## 2023-03-15 RX ADMIN — RANOLAZINE 500 MILLIGRAM(S): 500 TABLET, FILM COATED, EXTENDED RELEASE ORAL at 17:26

## 2023-03-15 RX ADMIN — APIXABAN 5 MILLIGRAM(S): 2.5 TABLET, FILM COATED ORAL at 17:26

## 2023-03-15 RX ADMIN — Medication 81 MILLIGRAM(S): at 12:12

## 2023-03-15 RX ADMIN — SACUBITRIL AND VALSARTAN 1 TABLET(S): 24; 26 TABLET, FILM COATED ORAL at 05:00

## 2023-03-15 RX ADMIN — RANOLAZINE 500 MILLIGRAM(S): 500 TABLET, FILM COATED, EXTENDED RELEASE ORAL at 05:01

## 2023-03-15 RX ADMIN — INSULIN GLARGINE 20 UNIT(S): 100 INJECTION, SOLUTION SUBCUTANEOUS at 21:32

## 2023-03-15 RX ADMIN — Medication 5 UNIT(S): at 08:28

## 2023-03-15 RX ADMIN — Medication 200 MICROGRAM(S): at 05:00

## 2023-03-15 RX ADMIN — SACUBITRIL AND VALSARTAN 1 TABLET(S): 24; 26 TABLET, FILM COATED ORAL at 17:26

## 2023-03-15 RX ADMIN — CHLORHEXIDINE GLUCONATE 1 APPLICATION(S): 213 SOLUTION TOPICAL at 12:13

## 2023-03-15 RX ADMIN — APIXABAN 5 MILLIGRAM(S): 2.5 TABLET, FILM COATED ORAL at 05:00

## 2023-03-15 NOTE — PROGRESS NOTE ADULT - ASSESSMENT
Impression:    CHF exacerbation  HO CHFrEF  Pulmonary edema  large rt pleural effusion  HO pAfib on AC  Ho CAD  Former smoker of 20 PY       Plan:    CXR noted and is stable; No effusions   Continue diuresis per cardiology; kidney function relatively stable  Pleural fluid transudate by protein ration; culture negative; cytology negative for malignant cells   Wean oxygen as tolerated; keep spo2 more than 92%  May need O2 if saturation less than 88% with ambulation   Continue albuterol as needed   Outpatient PFTs when stable given history of smoking   Now on Eliquis; hgb stable   Will follow as needed

## 2023-03-15 NOTE — PROGRESS NOTE ADULT - SUBJECTIVE AND OBJECTIVE BOX
Patient is a 77y old  Male who presents with a chief complaint of sob (15 Mar 2023 13:35)      INTERVAL HPI/OVERNIGHT EVENTS:   No overnight events   Afebrile, hemodynamically stable     ICU Vital Signs Last 24 Hrs  T(C): 35.6 (15 Mar 2023 15:57), Max: 36.7 (15 Mar 2023 04:00)  T(F): 96 (15 Mar 2023 15:57), Max: 98 (15 Mar 2023 04:00)  HR: 101 (15 Mar 2023 15:57) (72 - 101)  BP: 94/63 (15 Mar 2023 15:57) (94/63 - 143/75)  BP(mean): 73 (15 Mar 2023 15:57) (73 - 102)  ABP: --  ABP(mean): --  RR: 20 (15 Mar 2023 15:57) (14 - 26)  SpO2: 97% (15 Mar 2023 15:57) (84% - 99%)    O2 Parameters below as of 15 Mar 2023 15:57  Patient On (Oxygen Delivery Method): nasal cannula  O2 Flow (L/min): 2        I&O's Summary    14 Mar 2023 07:01  -  15 Mar 2023 07:00  --------------------------------------------------------  IN: 960 mL / OUT: 1750 mL / NET: -790 mL    15 Mar 2023 07:01  -  15 Mar 2023 17:14  --------------------------------------------------------  IN: 1700 mL / OUT: 280 mL / NET: 1420 mL          LABS:                        13.1   12.53 )-----------( 365      ( 15 Mar 2023 04:59 )             40.6     03-15    143  |  98  |  71<HH>  ----------------------------<  102<H>  3.7   |  32  |  2.3<H>    Ca    9.1      15 Mar 2023 04:59  Mg     2.1     03-15    TPro  5.3<L>  /  Alb  2.6<L>  /  TBili  0.3  /  DBili  x   /  AST  28  /  ALT  17  /  AlkPhos  108  03-15    PTT - ( 14 Mar 2023 11:05 )  PTT:60.9 sec    CAPILLARY BLOOD GLUCOSE      POCT Blood Glucose.: 144 mg/dL (15 Mar 2023 11:48)  POCT Blood Glucose.: 96 mg/dL (15 Mar 2023 08:25)  POCT Blood Glucose.: 140 mg/dL (14 Mar 2023 21:19)  POCT Blood Glucose.: 141 mg/dL (14 Mar 2023 17:19)        RADIOLOGY & ADDITIONAL TESTS:    Consultant(s) Notes Reviewed:  [x ] YES  [ ] NO    MEDICATIONS  (STANDING):  albuterol/ipratropium for Nebulization. 3 milliLiter(s) Nebulizer once  apixaban 5 milliGRAM(s) Oral every 12 hours  aspirin  chewable 81 milliGRAM(s) Oral daily  atorvastatin 40 milliGRAM(s) Oral at bedtime  chlorhexidine 2% Cloths 1 Application(s) Topical daily  insulin glargine Injectable (LANTUS) 20 Unit(s) SubCutaneous at bedtime  insulin lispro (ADMELOG) corrective regimen sliding scale   SubCutaneous three times a day before meals  insulin lispro Injectable (ADMELOG) 5 Unit(s) SubCutaneous three times a day before meals  levothyroxine 200 MICROGram(s) Oral daily  metoprolol tartrate 37.5 milliGRAM(s) Oral three times a day  pantoprazole    Tablet 40 milliGRAM(s) Oral before breakfast  ranolazine 500 milliGRAM(s) Oral two times a day  sacubitril 49 mG/valsartan 51 mG 1 Tablet(s) Oral two times a day  sodium chloride 0.9%. 1000 milliLiter(s) (250 mL/Hr) IV Continuous <Continuous>  tamsulosin 0.4 milliGRAM(s) Oral daily    MEDICATIONS  (PRN):  acetaminophen     Tablet .. 650 milliGRAM(s) Oral every 6 hours PRN Temp greater or equal to 38C (100.4F), Mild Pain (1 - 3)  albuterol    0.083% 2.5 milliGRAM(s) Nebulizer every 6 hours PRN Shortness of Breath and/or Wheezing  guaifenesin/dextromethorphan Oral Liquid 10 milliLiter(s) Oral three times a day PRN Cough      PHYSICAL EXAM:  GENERAL:   HEAD:  Atraumatic, Normocephalic  EYES: EOMI, PERRLA, conjunctiva and sclera clear  NECK: Supple, No JVD, Normal thyroid, no enlarged nodes  NERVOUS SYSTEM:  Alert & Awake.   CHEST/LUNG: B/L good air entry; No rales, rhonchi, or wheezing  HEART: S1S2 normal, no S3, Regular rate and rhythm; No murmurs  ABDOMEN: Soft, Nontender, Nondistended; Bowel sounds present  EXTREMITIES:  2+ Peripheral Pulses, No clubbing, cyanosis, or edema  LYMPH: No lymphadenopathy noted  SKIN: No rashes or lesions    Care Discussed with Consultants/Other Providers [ x] YES  [ ] NO

## 2023-03-15 NOTE — PROGRESS NOTE ADULT - SUBJECTIVE AND OBJECTIVE BOX
Patient is a 77y old  Male who presents with a chief complaint of sob (14 Mar 2023 16:53)        Over Night Events:    Appears comfortable   Laying flat   On 2LPM         ROS:  See HPI    PHYSICAL EXAM    ICU Vital Signs Last 24 Hrs  T(C): 36.6 (15 Mar 2023 12:00), Max: 36.7 (14 Mar 2023 16:00)  T(F): 97.8 (15 Mar 2023 12:00), Max: 98.1 (14 Mar 2023 16:00)  HR: 79 (15 Mar 2023 12:00) (72 - 91)  BP: 117/69 (15 Mar 2023 12:00) (99/51 - 143/75)  BP(mean): 87 (15 Mar 2023 12:00) (74 - 102)  ABP: --  ABP(mean): --  RR: 26 (15 Mar 2023 12:15) (14 - 26)  SpO2: 84% (15 Mar 2023 12:15) (84% - 99%)    O2 Parameters below as of 15 Mar 2023 12:15  Patient On (Oxygen Delivery Method): room air            General: NAD   HEENT: LAURO             Lymphatic system: No cervical LN   Lungs: Bilateral BS, clear   Cardiovascular: Regular   Gastrointestinal: Soft, Positive BS  Extremities: No clubbing.  Moves extremities.  Full Range of motion   Skin: Warm, intact  Neurological: No motor or sensory deficit       03-14-23 @ 07:01  -  03-15-23 @ 07:00  --------------------------------------------------------  IN:    Bumetanide: 40 mL    Heparin Infusion: 90 mL    IV PiggyBack: 250 mL    IV PiggyBack: 50 mL    Oral Fluid: 530 mL  Total IN: 960 mL    OUT:    Voided (mL): 1750 mL  Total OUT: 1750 mL    Total NET: -790 mL      03-15-23 @ 07:01  -  03-15-23 @ 13:36  --------------------------------------------------------  IN:    Oral Fluid: 220 mL    sodium chloride 0.9%: 500 mL  Total IN: 720 mL    OUT:    Voided (mL): 280 mL  Total OUT: 280 mL    Total NET: 440 mL          LABS:                            13.1   12.53 )-----------( 365      ( 15 Mar 2023 04:59 )             40.6                                               03-15    143  |  98  |  71<HH>  ----------------------------<  102<H>  3.7   |  32  |  2.3<H>    Ca    9.1      15 Mar 2023 04:59  Mg     2.1     03-15    TPro  5.3<L>  /  Alb  2.6<L>  /  TBili  0.3  /  DBili  x   /  AST  28  /  ALT  17  /  AlkPhos  108  03-15      PTT - ( 14 Mar 2023 11:05 )  PTT:60.9 sec                                                                                     LIVER FUNCTIONS - ( 15 Mar 2023 04:59 )  Alb: 2.6 g/dL / Pro: 5.3 g/dL / ALK PHOS: 108 U/L / ALT: 17 U/L / AST: 28 U/L / GGT: x                                                                                                                                       MEDICATIONS  (STANDING):  albuterol/ipratropium for Nebulization. 3 milliLiter(s) Nebulizer once  apixaban 5 milliGRAM(s) Oral every 12 hours  aspirin  chewable 81 milliGRAM(s) Oral daily  atorvastatin 40 milliGRAM(s) Oral at bedtime  chlorhexidine 2% Cloths 1 Application(s) Topical daily  insulin glargine Injectable (LANTUS) 20 Unit(s) SubCutaneous at bedtime  insulin lispro (ADMELOG) corrective regimen sliding scale   SubCutaneous three times a day before meals  insulin lispro Injectable (ADMELOG) 5 Unit(s) SubCutaneous three times a day before meals  levothyroxine 200 MICROGram(s) Oral daily  metoprolol tartrate 37.5 milliGRAM(s) Oral three times a day  pantoprazole    Tablet 40 milliGRAM(s) Oral before breakfast  ranolazine 500 milliGRAM(s) Oral two times a day  sacubitril 49 mG/valsartan 51 mG 1 Tablet(s) Oral two times a day  sodium chloride 0.9%. 1000 milliLiter(s) (250 mL/Hr) IV Continuous <Continuous>  tamsulosin 0.4 milliGRAM(s) Oral daily    MEDICATIONS  (PRN):  acetaminophen     Tablet .. 650 milliGRAM(s) Oral every 6 hours PRN Temp greater or equal to 38C (100.4F), Mild Pain (1 - 3)  albuterol    0.083% 2.5 milliGRAM(s) Nebulizer every 6 hours PRN Shortness of Breath and/or Wheezing  guaifenesin/dextromethorphan Oral Liquid 10 milliLiter(s) Oral three times a day PRN Cough      Xrays:  no new infiltrates; no effusions                                                                                    ECHO

## 2023-03-15 NOTE — PROGRESS NOTE ADULT - ASSESSMENT
77 year old M known HFrEF (EF 25% in 2021) s/p CRT-D, paroxysmal Afib on eliquis, CAD s/p CABG in 2016, Bullous pemphigoid, Hypertension, dyslipidemia Present to the ED for shortness of breath and chest pain and found to have decompensated Heart failure.    # Decompensated HFrEF   # Worsening LV dysfunction  # Right pleural effusion   - target -150ml/hr  - Swithced to bumex 2 mg BID> Will switch to PO. Patient's volume status improved.   - s/p Metolazone 5mg x 1  - c/w Entresto 49/51   - c/w metoprolol 50mg and 25 mg QD  - may need aldactone +/- Farxiga   - monitor BMP   - strict I&Os  - Thoracentesis 3/9: 1L transudative fluid removed. Heparin restarted at 4am 3/10   - c/w abx and follow up sputum culture. Abx finished course of azithro and rocephin. Pleural fluid cx negative  - 03/13: Patient had a run of [employer], consulted with EP regarding this as he has CRT-D. NTD    - uric acid 10.2   - Echo : GIID, 16% EF     # Chest pain   - mildly elevated troponin likely type II  - c/w aspirin  - c/w atorvastatin  - c/w metoprolol and ranolazine  - outpatient f/u for ischemic w/up    #Paroxysmal A.fib  - switch from heparin back to eliquis 5 mg BID   - c/w metoprolol    #DM  - c/w insulin   - targer -180    #hypethyroid  - c/w levothyroxine    DVT ppx: Heparin Drip   GI ppx: Pantoprazole  #CODE status: DNI ( as per living will according to patient's wife)

## 2023-03-16 LAB
ALBUMIN SERPL ELPH-MCNC: 2.6 G/DL — LOW (ref 3.5–5.2)
ALP SERPL-CCNC: 101 U/L — SIGNIFICANT CHANGE UP (ref 30–115)
ALT FLD-CCNC: 20 U/L — SIGNIFICANT CHANGE UP (ref 0–41)
ANION GAP SERPL CALC-SCNC: 13 MMOL/L — SIGNIFICANT CHANGE UP (ref 7–14)
AST SERPL-CCNC: 33 U/L — SIGNIFICANT CHANGE UP (ref 0–41)
BASOPHILS # BLD AUTO: 0.08 K/UL — SIGNIFICANT CHANGE UP (ref 0–0.2)
BASOPHILS NFR BLD AUTO: 0.6 % — SIGNIFICANT CHANGE UP (ref 0–1)
BILIRUB SERPL-MCNC: 0.3 MG/DL — SIGNIFICANT CHANGE UP (ref 0.2–1.2)
BUN SERPL-MCNC: 70 MG/DL — CRITICAL HIGH (ref 10–20)
CALCIUM SERPL-MCNC: 8.9 MG/DL — SIGNIFICANT CHANGE UP (ref 8.4–10.4)
CHLORIDE SERPL-SCNC: 101 MMOL/L — SIGNIFICANT CHANGE UP (ref 98–110)
CO2 SERPL-SCNC: 28 MMOL/L — SIGNIFICANT CHANGE UP (ref 17–32)
CREAT SERPL-MCNC: 2.3 MG/DL — HIGH (ref 0.7–1.5)
CULTURE RESULTS: SIGNIFICANT CHANGE UP
EGFR: 29 ML/MIN/1.73M2 — LOW
EOSINOPHIL # BLD AUTO: 0.86 K/UL — HIGH (ref 0–0.7)
EOSINOPHIL NFR BLD AUTO: 7 % — SIGNIFICANT CHANGE UP (ref 0–8)
FERRITIN SERPL-MCNC: 609 NG/ML — HIGH (ref 30–400)
GLUCOSE BLDC GLUCOMTR-MCNC: 112 MG/DL — HIGH (ref 70–99)
GLUCOSE BLDC GLUCOMTR-MCNC: 122 MG/DL — HIGH (ref 70–99)
GLUCOSE BLDC GLUCOMTR-MCNC: 131 MG/DL — HIGH (ref 70–99)
GLUCOSE BLDC GLUCOMTR-MCNC: 137 MG/DL — HIGH (ref 70–99)
GLUCOSE SERPL-MCNC: 114 MG/DL — HIGH (ref 70–99)
HCT VFR BLD CALC: 39.3 % — LOW (ref 42–52)
HGB BLD-MCNC: 12.6 G/DL — LOW (ref 14–18)
IMM GRANULOCYTES NFR BLD AUTO: 2.4 % — HIGH (ref 0.1–0.3)
LYMPHOCYTES # BLD AUTO: 2.77 K/UL — SIGNIFICANT CHANGE UP (ref 1.2–3.4)
LYMPHOCYTES # BLD AUTO: 22.4 % — SIGNIFICANT CHANGE UP (ref 20.5–51.1)
MAGNESIUM SERPL-MCNC: 2 MG/DL — SIGNIFICANT CHANGE UP (ref 1.8–2.4)
MCHC RBC-ENTMCNC: 27 PG — SIGNIFICANT CHANGE UP (ref 27–31)
MCHC RBC-ENTMCNC: 32.1 G/DL — SIGNIFICANT CHANGE UP (ref 32–37)
MCV RBC AUTO: 84.2 FL — SIGNIFICANT CHANGE UP (ref 80–94)
MONOCYTES # BLD AUTO: 1.03 K/UL — HIGH (ref 0.1–0.6)
MONOCYTES NFR BLD AUTO: 8.3 % — SIGNIFICANT CHANGE UP (ref 1.7–9.3)
NEUTROPHILS # BLD AUTO: 7.32 K/UL — HIGH (ref 1.4–6.5)
NEUTROPHILS NFR BLD AUTO: 59.3 % — SIGNIFICANT CHANGE UP (ref 42.2–75.2)
NRBC # BLD: 0 /100 WBCS — SIGNIFICANT CHANGE UP (ref 0–0)
PLATELET # BLD AUTO: 335 K/UL — SIGNIFICANT CHANGE UP (ref 130–400)
POTASSIUM SERPL-MCNC: 3.9 MMOL/L — SIGNIFICANT CHANGE UP (ref 3.5–5)
POTASSIUM SERPL-SCNC: 3.9 MMOL/L — SIGNIFICANT CHANGE UP (ref 3.5–5)
PROT SERPL-MCNC: 5.2 G/DL — LOW (ref 6–8)
RBC # BLD: 4.67 M/UL — LOW (ref 4.7–6.1)
RBC # FLD: 14.6 % — HIGH (ref 11.5–14.5)
SARS-COV-2 RNA SPEC QL NAA+PROBE: SIGNIFICANT CHANGE UP
SODIUM SERPL-SCNC: 142 MMOL/L — SIGNIFICANT CHANGE UP (ref 135–146)
SPECIMEN SOURCE: SIGNIFICANT CHANGE UP
WBC # BLD: 12.36 K/UL — HIGH (ref 4.8–10.8)
WBC # FLD AUTO: 12.36 K/UL — HIGH (ref 4.8–10.8)

## 2023-03-16 RX ADMIN — Medication 200 MICROGRAM(S): at 05:07

## 2023-03-16 RX ADMIN — INSULIN GLARGINE 20 UNIT(S): 100 INJECTION, SOLUTION SUBCUTANEOUS at 21:41

## 2023-03-16 RX ADMIN — Medication 37.5 MILLIGRAM(S): at 21:41

## 2023-03-16 RX ADMIN — PANTOPRAZOLE SODIUM 40 MILLIGRAM(S): 20 TABLET, DELAYED RELEASE ORAL at 05:07

## 2023-03-16 RX ADMIN — Medication 81 MILLIGRAM(S): at 11:21

## 2023-03-16 RX ADMIN — Medication 37.5 MILLIGRAM(S): at 14:09

## 2023-03-16 RX ADMIN — Medication 5 UNIT(S): at 08:27

## 2023-03-16 RX ADMIN — ATORVASTATIN CALCIUM 40 MILLIGRAM(S): 80 TABLET, FILM COATED ORAL at 21:41

## 2023-03-16 RX ADMIN — RANOLAZINE 500 MILLIGRAM(S): 500 TABLET, FILM COATED, EXTENDED RELEASE ORAL at 05:07

## 2023-03-16 RX ADMIN — Medication 37.5 MILLIGRAM(S): at 05:07

## 2023-03-16 RX ADMIN — Medication 5 UNIT(S): at 17:44

## 2023-03-16 RX ADMIN — APIXABAN 5 MILLIGRAM(S): 2.5 TABLET, FILM COATED ORAL at 05:09

## 2023-03-16 RX ADMIN — SACUBITRIL AND VALSARTAN 1 TABLET(S): 24; 26 TABLET, FILM COATED ORAL at 05:06

## 2023-03-16 RX ADMIN — TAMSULOSIN HYDROCHLORIDE 0.4 MILLIGRAM(S): 0.4 CAPSULE ORAL at 11:21

## 2023-03-16 RX ADMIN — SACUBITRIL AND VALSARTAN 1 TABLET(S): 24; 26 TABLET, FILM COATED ORAL at 17:46

## 2023-03-16 RX ADMIN — CHLORHEXIDINE GLUCONATE 1 APPLICATION(S): 213 SOLUTION TOPICAL at 11:22

## 2023-03-16 RX ADMIN — APIXABAN 5 MILLIGRAM(S): 2.5 TABLET, FILM COATED ORAL at 17:46

## 2023-03-16 RX ADMIN — RANOLAZINE 500 MILLIGRAM(S): 500 TABLET, FILM COATED, EXTENDED RELEASE ORAL at 17:46

## 2023-03-16 NOTE — CHART NOTE - NSCHARTNOTEFT_GEN_A_CORE
Code STEMI was activated for this patient to which I responded immediately. Pt was evaluated in the ER and EKG was reviewed. 77 year old M patient with past medical history of HFrEF last echo showed and EF of 26% s/p ICD paroxysmal Afib on Eliquis CAD s/p CABG in 2016, Bullous pemphigoid s/p IVIG, Hypertension, dyslipidemia Present to the ED for SOB and chest pressure. EKG did not show ST elevations. It showed paced rhythm and PVCs. Case was discussed with interventional cardiologist on call and code STEMI was cancelled.
Registered Dietitian LOS Screen:    Pertinent Medical Information: Pt presented for evaluation of difficulty breathing and possible STEMI. Pt found to have decompensated Heart failure. Right pleural effusion noted. Thoracentesis 3/9: 1L transudative fluid removed.    Followed by SLP services this admit - latest SLP anni 3/12 notes +toleration for thin liquids, puree solids, soft and bite-sized, regular solids w/o overt s/s of penetration/aspiration. Recommends Regular solids, thin liquids.    PMH includes multiple vessel disease s/p four-vessel bypass 6 years ago, DM, HFrEF 25-30%.    Currently receives DASH/TLC + Consistent Carbohydrate diet (no snacks). Reports good appetite & tolerance to diet order, with % po intake most meals.    Pertinent Labs:  @ 05:49: Na 142, BUN 70<HH>, Cr 2.3<H>, <H>, K+ 3.9, Mg 2.0, Alk Phos 101, ALT/SGPT 20, AST/SGOT 33    Finger Sticks:  POCT Blood Glucose.: 137 mg/dL ( @ 17:17)  POCT Blood Glucose.: 112 mg/dL ( @ 11:32)  POCT Blood Glucose.: 122 mg/dL ( @ 08:11)  POCT Blood Glucose.: 145 mg/dL (03-15 @ 21:13)    MEDICATIONS  (STANDING):  albuterol/ipratropium for Nebulization. 3 milliLiter(s) Nebulizer once  apixaban 5 milliGRAM(s) Oral every 12 hours  aspirin  chewable 81 milliGRAM(s) Oral daily  atorvastatin 40 milliGRAM(s) Oral at bedtime  chlorhexidine 2% Cloths 1 Application(s) Topical daily  insulin glargine Injectable (LANTUS) 20 Unit(s) SubCutaneous at bedtime  insulin lispro (ADMELOG) corrective regimen sliding scale   SubCutaneous three times a day before meals  insulin lispro Injectable (ADMELOG) 5 Unit(s) SubCutaneous three times a day before meals  levothyroxine 200 MICROGram(s) Oral daily  metoprolol tartrate 37.5 milliGRAM(s) Oral three times a day  pantoprazole    Tablet 40 milliGRAM(s) Oral before breakfast  ranolazine 500 milliGRAM(s) Oral two times a day  sacubitril 49 mG/valsartan 51 mG 1 Tablet(s) Oral two times a day  sodium chloride 0.9%. 1000 milliLiter(s) (250 mL/Hr) IV Continuous <Continuous>  tamsulosin 0.4 milliGRAM(s) Oral daily    MEDICATIONS  (PRN):  acetaminophen     Tablet .. 650 milliGRAM(s) Oral every 6 hours PRN Temp greater or equal to 38C (100.4F), Mild Pain (1 - 3)  albuterol    0.083% 2.5 milliGRAM(s) Nebulizer every 6 hours PRN Shortness of Breath and/or Wheezing  guaifenesin/dextromethorphan Oral Liquid 10 milliLiter(s) Oral three times a day PRN Cough    Anthropometric Data:  Ht: 167.6  Wt: 83.4 kg (dosing wt)  BMI: 29.7 (using dosing wt 83.4 kg)  IBW: 64.5 kg.    Daily wts:  Daily Weight in k.9 (-16), Weight in k (-15), Weight in k.2 (-14), Weight in k.2 (-13), Weight in k.2 (-12), Weight in k.4 (-11), Weight in k (-10)    Wt fluctuations observed suspected to be related to fluid shifts; will continue to monitor wt trends.    Physical Findings:  Last BM reported 3/16. Reports no GI issues at this time. Reportedly tolerating current diet order texture/consistency, with no chewing/swallowing difficulty reported at this time. No edema noted at this time. Skin: R back surgical incision. No pressure injury noted at this time.    Nutrition Education: Reviewed current diet order. Discussed heart healthy & DM nutrition therapy concepts.    No nutrition diagnosis at this time. Pt not at nutrition risk at this time. RD to rescreen pt for LOS in 7 days.
[facility] AICD interrogated 3/13/23  No events noted  Device functioning properly    Telemetry reviewed with Dr Camara, brief runs of NSVT  No intervention at this time    DDD  bpm  AT/AF [place] < 1%    AP <1%  BP 98%    Recall EP as needed [id number]

## 2023-03-17 ENCOUNTER — TRANSCRIPTION ENCOUNTER (OUTPATIENT)
Age: 78
End: 2023-03-17

## 2023-03-17 VITALS
RESPIRATION RATE: 27 BRPM | OXYGEN SATURATION: 94 % | SYSTOLIC BLOOD PRESSURE: 126 MMHG | HEART RATE: 75 BPM | DIASTOLIC BLOOD PRESSURE: 64 MMHG

## 2023-03-17 LAB
ALBUMIN SERPL ELPH-MCNC: 2.6 G/DL — LOW (ref 3.5–5.2)
ALP SERPL-CCNC: 100 U/L — SIGNIFICANT CHANGE UP (ref 30–115)
ALT FLD-CCNC: 22 U/L — SIGNIFICANT CHANGE UP (ref 0–41)
ANION GAP SERPL CALC-SCNC: 9 MMOL/L — SIGNIFICANT CHANGE UP (ref 7–14)
AST SERPL-CCNC: 33 U/L — SIGNIFICANT CHANGE UP (ref 0–41)
BASOPHILS # BLD AUTO: 0.08 K/UL — SIGNIFICANT CHANGE UP (ref 0–0.2)
BASOPHILS NFR BLD AUTO: 0.7 % — SIGNIFICANT CHANGE UP (ref 0–1)
BILIRUB SERPL-MCNC: 0.3 MG/DL — SIGNIFICANT CHANGE UP (ref 0.2–1.2)
BUN SERPL-MCNC: 63 MG/DL — CRITICAL HIGH (ref 10–20)
CALCIUM SERPL-MCNC: 8.9 MG/DL — SIGNIFICANT CHANGE UP (ref 8.4–10.4)
CHLORIDE SERPL-SCNC: 102 MMOL/L — SIGNIFICANT CHANGE UP (ref 98–110)
CO2 SERPL-SCNC: 30 MMOL/L — SIGNIFICANT CHANGE UP (ref 17–32)
CREAT SERPL-MCNC: 1.8 MG/DL — HIGH (ref 0.7–1.5)
EGFR: 39 ML/MIN/1.73M2 — LOW
EOSINOPHIL # BLD AUTO: 0.85 K/UL — HIGH (ref 0–0.7)
EOSINOPHIL NFR BLD AUTO: 7.3 % — SIGNIFICANT CHANGE UP (ref 0–8)
GLUCOSE BLDC GLUCOMTR-MCNC: 101 MG/DL — HIGH (ref 70–99)
GLUCOSE BLDC GLUCOMTR-MCNC: 96 MG/DL — SIGNIFICANT CHANGE UP (ref 70–99)
GLUCOSE SERPL-MCNC: 91 MG/DL — SIGNIFICANT CHANGE UP (ref 70–99)
HCT VFR BLD CALC: 39.4 % — LOW (ref 42–52)
HGB BLD-MCNC: 12.5 G/DL — LOW (ref 14–18)
IMM GRANULOCYTES NFR BLD AUTO: 2.1 % — HIGH (ref 0.1–0.3)
LYMPHOCYTES # BLD AUTO: 2.6 K/UL — SIGNIFICANT CHANGE UP (ref 1.2–3.4)
LYMPHOCYTES # BLD AUTO: 22.3 % — SIGNIFICANT CHANGE UP (ref 20.5–51.1)
MAGNESIUM SERPL-MCNC: 2.1 MG/DL — SIGNIFICANT CHANGE UP (ref 1.8–2.4)
MCHC RBC-ENTMCNC: 26.7 PG — LOW (ref 27–31)
MCHC RBC-ENTMCNC: 31.7 G/DL — LOW (ref 32–37)
MCV RBC AUTO: 84.2 FL — SIGNIFICANT CHANGE UP (ref 80–94)
MONOCYTES # BLD AUTO: 0.9 K/UL — HIGH (ref 0.1–0.6)
MONOCYTES NFR BLD AUTO: 7.7 % — SIGNIFICANT CHANGE UP (ref 1.7–9.3)
NEUTROPHILS # BLD AUTO: 7.01 K/UL — HIGH (ref 1.4–6.5)
NEUTROPHILS NFR BLD AUTO: 59.9 % — SIGNIFICANT CHANGE UP (ref 42.2–75.2)
NRBC # BLD: 0 /100 WBCS — SIGNIFICANT CHANGE UP (ref 0–0)
PLATELET # BLD AUTO: 332 K/UL — SIGNIFICANT CHANGE UP (ref 130–400)
POTASSIUM SERPL-MCNC: 4.1 MMOL/L — SIGNIFICANT CHANGE UP (ref 3.5–5)
POTASSIUM SERPL-SCNC: 4.1 MMOL/L — SIGNIFICANT CHANGE UP (ref 3.5–5)
PROT SERPL-MCNC: 5 G/DL — LOW (ref 6–8)
RBC # BLD: 4.68 M/UL — LOW (ref 4.7–6.1)
RBC # FLD: 14.5 % — SIGNIFICANT CHANGE UP (ref 11.5–14.5)
SODIUM SERPL-SCNC: 141 MMOL/L — SIGNIFICANT CHANGE UP (ref 135–146)
WBC # BLD: 11.68 K/UL — HIGH (ref 4.8–10.8)
WBC # FLD AUTO: 11.68 K/UL — HIGH (ref 4.8–10.8)

## 2023-03-17 PROCEDURE — 99238 HOSP IP/OBS DSCHRG MGMT 30/<: CPT

## 2023-03-17 RX ORDER — APIXABAN 2.5 MG/1
1 TABLET, FILM COATED ORAL
Qty: 0 | Refills: 0 | DISCHARGE
Start: 2023-03-17

## 2023-03-17 RX ORDER — SACUBITRIL AND VALSARTAN 24; 26 MG/1; MG/1
1 TABLET, FILM COATED ORAL
Qty: 0 | Refills: 0 | DISCHARGE
Start: 2023-03-17

## 2023-03-17 RX ORDER — METOPROLOL TARTRATE 50 MG
0 TABLET ORAL
Qty: 0 | Refills: 0 | DISCHARGE

## 2023-03-17 RX ORDER — BUMETANIDE 0.25 MG/ML
1 INJECTION INTRAMUSCULAR; INTRAVENOUS
Qty: 0 | Refills: 0 | DISCHARGE
Start: 2023-03-17

## 2023-03-17 RX ORDER — METOPROLOL TARTRATE 50 MG
1 TABLET ORAL
Refills: 0 | DISCHARGE
Start: 2023-03-17

## 2023-03-17 RX ORDER — APIXABAN 2.5 MG/1
0 TABLET, FILM COATED ORAL
Qty: 0 | Refills: 0 | DISCHARGE

## 2023-03-17 RX ORDER — INSULIN LISPRO 100/ML
5 VIAL (ML) SUBCUTANEOUS
Qty: 0 | Refills: 0 | DISCHARGE
Start: 2023-03-17

## 2023-03-17 RX ORDER — BUMETANIDE 0.25 MG/ML
1 INJECTION INTRAMUSCULAR; INTRAVENOUS
Refills: 0 | Status: DISCONTINUED | OUTPATIENT
Start: 2023-03-17 | End: 2023-03-17

## 2023-03-17 RX ORDER — ALBUTEROL 90 UG/1
1 AEROSOL, METERED ORAL
Qty: 0 | Refills: 0 | DISCHARGE
Start: 2023-03-17

## 2023-03-17 RX ORDER — FOLIC ACID 0.8 MG
1 TABLET ORAL
Qty: 0 | Refills: 0 | DISCHARGE

## 2023-03-17 RX ORDER — SACUBITRIL AND VALSARTAN 24; 26 MG/1; MG/1
1 TABLET, FILM COATED ORAL
Qty: 0 | Refills: 0 | DISCHARGE

## 2023-03-17 RX ADMIN — APIXABAN 5 MILLIGRAM(S): 2.5 TABLET, FILM COATED ORAL at 05:07

## 2023-03-17 RX ADMIN — SACUBITRIL AND VALSARTAN 1 TABLET(S): 24; 26 TABLET, FILM COATED ORAL at 05:07

## 2023-03-17 RX ADMIN — CHLORHEXIDINE GLUCONATE 1 APPLICATION(S): 213 SOLUTION TOPICAL at 11:19

## 2023-03-17 RX ADMIN — Medication 5 UNIT(S): at 08:32

## 2023-03-17 RX ADMIN — Medication 200 MICROGRAM(S): at 05:07

## 2023-03-17 RX ADMIN — BUMETANIDE 1 MILLIGRAM(S): 0.25 INJECTION INTRAMUSCULAR; INTRAVENOUS at 11:19

## 2023-03-17 RX ADMIN — PANTOPRAZOLE SODIUM 40 MILLIGRAM(S): 20 TABLET, DELAYED RELEASE ORAL at 05:07

## 2023-03-17 RX ADMIN — RANOLAZINE 500 MILLIGRAM(S): 500 TABLET, FILM COATED, EXTENDED RELEASE ORAL at 05:07

## 2023-03-17 RX ADMIN — Medication 37.5 MILLIGRAM(S): at 05:07

## 2023-03-17 RX ADMIN — Medication 81 MILLIGRAM(S): at 11:19

## 2023-03-17 NOTE — DISCHARGE NOTE PROVIDER - PROVIDER TOKENS
PROVIDER:[TOKEN:[[id number]],FOLLOWUP:[1 week]],PROVIDER:[TOKEN:[[id number]],FOLLOWUP:[2 weeks]]

## 2023-03-17 NOTE — DISCHARGE NOTE PROVIDER - CARE PROVIDER_API CALL
Symone Mesa  INTERNAL MEDICINE  [address] [place] [address]  Phone: [phone]  Fax: [phone]  Follow Up Time: 1 week    Liborio Madrigal)  Critical Care Medicine; Internal Medicine; Pulmonary Disease  [address] [place] [address]  Phone: [phone]  Fax: [phone]  Follow Up Time: 2 weeks

## 2023-03-17 NOTE — DISCHARGE NOTE PROVIDER - HOSPITAL COURSE
77-year-old male with pmhx of multiple vessel disease s/p four-vessel bypass 6 years ago, DM, HFrEF 25-30% presents to the ED from doctor's office for evaluation of difficulty breathing and possible STEMI.  EMS states their prehospital EKG demonstrated STEMI and case was called in to 911 system for prearrival. In the emergency department the patient complains of worsening dyspnea over the last 4 days, started on Friday with a cough, progressed to PND and orthopnea for several days despite escalation in diuretics. Also reports chest pain on arrival as well. He is followed by cardiologist Dr. Godinez with no recent changes in status.    In the ED, patient is on bipap comfortable and not in respiratory distress. Currently hemodynamically stable with no drips.     Vital Signs Last 24 Hrs  T(C): --  T(F): --  HR: 98 (07 Mar 2023 15:25) (95 - 105)  BP: 132/66 (07 Mar 2023 15:25) (132/66 - 132/66)  BP(mean): --  RR: 32 (07 Mar 2023 15:25) (30 - 32)  SpO2: 100% (07 Mar 2023 15:25) (95% - 100%)    Parameters below as of 07 Mar 2023 15:25  Patient On (Oxygen Delivery Method): BiPAP/CPAP      Hospital course:     Patient was admitted for Decompensated HFrEF and was started on Bumex drip of 2 mg/ hr with a target urine output of 100-150 ml/hr and was diuresing well. Patient also had  right sided effusion that was removed by thoracentesis (1L removed) by the pulmonology team and effusion resolved. Echo was done and showed EF 16% with GIID. Patient also had a atypical chest pain and mildly elevated trops that was mostly 2/2 to demand and decision made to fu outpatient for ischemic workup. Patient was switched to IV bumex and continued diuresing well and he eventually was off the IV bumex. Patient also completed a course of abx rocephin and azithromycin. Patient's condition and volume status improved and stable and ready for dc.     Last day A/p:   77 year old M known HFrEF (EF 25% in 2021) s/p CRT-D, paroxysmal Afib on eliquis, CAD s/p CABG in 2016, Bullous pemphigoid, Hypertension, dyslipidemia Present to the ED for shortness of breath and chest pain and found to have decompensated Heart failure.    # Decompensated HFrEF   # Worsening LV dysfunction  # Right pleural effusion   - target -150ml/hr  - Swithced to bumex 2 mg BID> Will switch to PO. Patient's volume status improved.   - s/p Metolazone 5mg x 1  - c/w Entresto 49/51   - c/w metoprolol 37.5 TID  - strict I&Os  - Thoracentesis 3/9: 1L transudative fluid removed. Heparin restarted at 4am 3/10   - c/w abx and follow up sputum culture. Abx finished course of azithro and rocephin. Pleural fluid cx negative  - 03/13: Patient had a run of [facility], consulted with EP regarding this as he has CRT-D. NTD    - uric acid 10.2   - Echo : GIID, 16% EF     # Chest pain   - mildly elevated troponin likely type II  - c/w aspirin  - c/w atorvastatin  - c/w metoprolol and ranolazine  - outpatient f/u for ischemic w/up    #Paroxysmal A.fib  - switch from heparin back to eliquis 5 mg BID   - c/w metoprolol    #DM  - c/w insulin   - targer -180    #hypethyroid  - c/w levothyroxine    DVT ppx: eliquis  GI ppx: Pantoprazole  #CODE status: DNI ( as per living will according to patient's wife)

## 2023-03-17 NOTE — DISCHARGE NOTE PROVIDER - NSDCCPCAREPLAN_GEN_ALL_CORE_FT
PRINCIPAL DISCHARGE DIAGNOSIS  Diagnosis: Acute exacerbation of CHF (congestive heart failure)  Assessment and Plan of Treatment: you had a volume overload status secondary to decompensation of heart failure. You were treated with iv diuretics that helped improve your volume status. Please continue to take your medications as prescribed, avoid salts. Please continue to follow up with your cardiologist. Also follow up with pulmonology DrAbbi for having your lung function tested given the history of smoking.      SECONDARY DISCHARGE DIAGNOSES  Diagnosis: Non-ST elevation MI (NSTEMI)  Assessment and Plan of Treatment: You had elevation of cardiac enzymes and will have to follow up outpatient for cardiology for possible ischemia workup.

## 2023-03-17 NOTE — DISCHARGE NOTE PROVIDER - NSDCFUSCHEDAPPT_GEN_ALL_CORE_FT
Appt given/caterina   [facility] Physician [facility]  CARDIOLOGY [address] [facility]  Scheduled Appointment: 04/28/2023     Double O-Z Flap Text: The defect edges were debeveled with a #15 scalpel blade.  Given the location of the defect, shape of the defect and the proximity to free margins a Double O-Z flap was deemed most appropriate.  Using a sterile surgical marker, an appropriate transposition flap was drawn incorporating the defect and placing the expected incisions within the relaxed skin tension lines where possible. The area thus outlined was incised deep to adipose tissue with a #15 scalpel blade.  The skin margins were undermined to an appropriate distance in all directions utilizing iris scissors.

## 2023-03-17 NOTE — DISCHARGE NOTE PROVIDER - NSDCMRMEDTOKEN_GEN_ALL_CORE_FT
albuterol 2.5 mg/3 mL (0.083%) inhalation solution: 1 puff(s) inhaled every 6 hours, As Needed for shortness of breath and/or wheezing   apixaban 5 mg oral tablet: 1 tab(s) orally every 12 hours  aspirin 81 mg oral tablet, chewable: 1 tab(s) orally once a day  atorvastatin 40 mg oral tablet: 1 tab(s) orally once a day  fenofibrate 160 mg oral tablet: 1 tab(s) orally once a day  Flomax 0.4 mg oral capsule: 1 cap(s) orally once a day  folic acid 1 mg oral tablet: 1 tab(s) orally once a day  insulin lispro 100 units/mL injectable solution: 5 unit(s) injectable 3 times a day  Lantus: 20 unit(s) subcutaneous once a day (at bedtime)  levothyroxine 200 mcg (0.2 mg) oral tablet: 1 tab(s) orally once a day  metoprolol tartrate 37.5 mg oral tablet: 1 tab(s) orally 3 times a day  omeprazole 40 mg oral delayed release capsule: 1 cap(s) orally once a day  Ranexa 500 mg oral tablet, extended release: 1 tab(s) orally 2 times a day  sacubitril-valsartan 49 mg-51 mg oral tablet: 1 tab(s) orally 2 times a day   albuterol 2.5 mg/3 mL (0.083%) inhalation solution: 1 puff(s) inhaled every 6 hours, As Needed for shortness of breath and/or wheezing   apixaban 5 mg oral tablet: 1 tab(s) orally every 12 hours  aspirin 81 mg oral tablet, chewable: 1 tab(s) orally once a day  atorvastatin 40 mg oral tablet: 1 tab(s) orally once a day  bumetanide 1 mg oral tablet: 1 tab(s) orally 2 times a day  fenofibrate 160 mg oral tablet: 1 tab(s) orally once a day  Flomax 0.4 mg oral capsule: 1 cap(s) orally once a day  folic acid 1 mg oral tablet: 1 tab(s) orally once a day  insulin lispro 100 units/mL injectable solution: 5 unit(s) injectable 3 times a day  Lantus: 20 unit(s) subcutaneous once a day (at bedtime)  levothyroxine 200 mcg (0.2 mg) oral tablet: 1 tab(s) orally once a day  metoprolol tartrate 37.5 mg oral tablet: 1 tab(s) orally 3 times a day  omeprazole 40 mg oral delayed release capsule: 1 cap(s) orally once a day  Ranexa 500 mg oral tablet, extended release: 1 tab(s) orally 2 times a day  sacubitril-valsartan 49 mg-51 mg oral tablet: 1 tab(s) orally 2 times a day   albuterol 2.5 mg/3 mL (0.083%) inhalation solution: 1 puff(s) inhaled every 6 hours, As Needed for shortness of breath and/or wheezing   apixaban 5 mg oral tablet: 1 tab(s) orally every 12 hours  aspirin 81 mg oral tablet, chewable: 1 tab(s) orally once a day  atorvastatin 40 mg oral tablet: 1 tab(s) orally once a day  bumetanide 1 mg oral tablet: 1 tab(s) orally 2 times a day  fenofibrate 160 mg oral tablet: 1 tab(s) orally once a day  Flomax 0.4 mg oral capsule: 1 cap(s) orally once a day  Lantus: 20 unit(s) subcutaneous once a day (at bedtime)  levothyroxine 200 mcg (0.2 mg) oral tablet: 1 tab(s) orally once a day  metoprolol tartrate 37.5 mg oral tablet: 1 tab(s) orally 3 times a day  omeprazole 40 mg oral delayed release capsule: 1 cap(s) orally once a day  Ranexa 500 mg oral tablet, extended release: 1 tab(s) orally 2 times a day  sacubitril-valsartan 49 mg-51 mg oral tablet: 1 tab(s) orally 2 times a day

## 2023-03-17 NOTE — DISCHARGE NOTE NURSING/CASE MANAGEMENT/SOCIAL WORK - NSDCPEFALRISK_GEN_ALL_CORE
For information on Fall & Injury Prevention, visit: [email or web address] OR  [email or web address] OR  https://www.cdc.gov/steadi/patient.html

## 2023-03-17 NOTE — DISCHARGE NOTE NURSING/CASE MANAGEMENT/SOCIAL WORK - PATIENT PORTAL LINK FT
You can access the FollowMyHealth Patient Portal offered by [facility] by registering at the following website: [email or web address]. By joining [facility] FollowMyHealth portal, you will also be able to view your health information using other applications (apps) compatible with our system.

## 2023-04-04 ENCOUNTER — APPOINTMENT (OUTPATIENT)
Dept: PULMONOLOGY | Facility: CLINIC | Age: 78
End: 2023-04-04
Payer: MEDICARE

## 2023-04-04 VITALS
WEIGHT: 186 LBS | RESPIRATION RATE: 14 BRPM | DIASTOLIC BLOOD PRESSURE: 80 MMHG | SYSTOLIC BLOOD PRESSURE: 120 MMHG | HEIGHT: 66 IN | BODY MASS INDEX: 29.89 KG/M2 | HEART RATE: 73 BPM | OXYGEN SATURATION: 96 %

## 2023-04-04 PROCEDURE — 94010 BREATHING CAPACITY TEST: CPT

## 2023-04-04 PROCEDURE — 71046 X-RAY EXAM CHEST 2 VIEWS: CPT

## 2023-04-04 PROCEDURE — 99214 OFFICE O/P EST MOD 30 MIN: CPT | Mod: 25

## 2023-04-04 NOTE — PROCEDURE
[FreeTextEntry1] : Chest PA and Lateral View\par \par Reason: \par \par Shortness of breath and cough\par \par Findings:\par \par The retro-cardial and retero-sternal spaces are clear.\par \par Increased markings noted, more on the right side. No effusions. \par \par The posterior and lateral costo-phrenic angles are clear.\par \par No hilar or mediastinal mass is seen.\par \par Domes of diaphragm are normal in position and contour.\par \par The cardiac outline is normal.\par \par No obvious skeletal abnormality is seen.\par \par Impression:\par \par Mild edema. \par

## 2023-04-04 NOTE — HISTORY OF PRESENT ILLNESS
[TextBox_4] : 77-year-old man with history of heart failure, AICD placement, on oral diuretics twice daily, follows regularly with his cardiologist, recent admission to the hospital with CHF exacerbation, pulmonary edema, pleural effusion status postthoracentesis yielding transudative fluid, discharged on home oxygen and most recently his O2 saturation on room air has been adequate, presenting for follow-up.  On exam today he still has edema in the lower extremities, lungs are clear.  He states that he has gained weight, and feels edematous.  He does have occasional wheezing.  He is a former smoker but quit a long time ago.

## 2023-04-04 NOTE — PHYSICAL EXAM
[No Acute Distress] : no acute distress [Normal Oropharynx] : normal oropharynx [Normal Appearance] : normal appearance [No Neck Mass] : no neck mass [Normal Rate/Rhythm] : normal rate/rhythm [Normal S1, S2] : normal s1, s2 [No Murmurs] : no murmurs [No Resp Distress] : no resp distress [Clear to Auscultation Bilaterally] : clear to auscultation bilaterally [No Abnormalities] : no abnormalities [Benign] : benign [Normal Gait] : normal gait [No Clubbing] : no clubbing [No Cyanosis] : no cyanosis [FROM] : FROM [Normal Color/ Pigmentation] : normal color/ pigmentation [No Focal Deficits] : no focal deficits [Oriented x3] : oriented x3 [Normal Affect] : normal affect [TextBox_68] : No rales  [TextBox_105] : Pitting edema in both lower extremities

## 2023-04-28 ENCOUNTER — NON-APPOINTMENT (OUTPATIENT)
Age: 78
End: 2023-04-28

## 2023-04-28 ENCOUNTER — APPOINTMENT (OUTPATIENT)
Dept: CARDIOLOGY | Facility: CLINIC | Age: 78
End: 2023-04-28
Payer: MEDICARE

## 2023-04-28 PROCEDURE — 93295 DEV INTERROG REMOTE 1/2/MLT: CPT

## 2023-04-28 PROCEDURE — 93296 REM INTERROG EVL PM/IDS: CPT

## 2023-05-09 ENCOUNTER — APPOINTMENT (OUTPATIENT)
Dept: PULMONOLOGY | Facility: CLINIC | Age: 78
End: 2023-05-09
Payer: MEDICARE

## 2023-05-09 VITALS
BODY MASS INDEX: 29.73 KG/M2 | HEART RATE: 96 BPM | DIASTOLIC BLOOD PRESSURE: 86 MMHG | OXYGEN SATURATION: 96 % | SYSTOLIC BLOOD PRESSURE: 130 MMHG | HEIGHT: 66 IN | WEIGHT: 185 LBS

## 2023-05-09 DIAGNOSIS — N18.9 CHRONIC KIDNEY DISEASE, UNSPECIFIED: ICD-10-CM

## 2023-05-09 PROCEDURE — 99214 OFFICE O/P EST MOD 30 MIN: CPT

## 2023-05-09 NOTE — HISTORY OF PRESENT ILLNESS
[TextBox_4] : 77-year-old man with history of heart failure, AICD placement, on oral diuretics twice daily, follows regularly with his cardiologist, recent admission to the hospital with CHF exacerbation, pulmonary edema, pleural effusion status postthoracentesis yielding transudative fluid, discharged on home oxygen and most recently his O2 saturation on room air has been adequate, presenting for follow-up.  On exam today he still has edema in the lower extremities, lungs are clear.  He states that he has gained weight, and feels edematous.  He does have occasional wheezing.  He is a former smoker but quit a long time ago.\par \par 5/9/23: Continues to be short of breath. Swelling in the legs as well. No cough and no wheezing. Still has crackles on exam. CXR with right effusion; too small to tap; Will follow in 1 month and do bedside US if possible. Spoke to cardiology and we ll continue high dose diuretics for now.

## 2023-05-09 NOTE — PROCEDURE
[FreeTextEntry1] : Chest PA and Lateral View\par \par Reason: \par \par Shortness of breath and cough\par \par Findings:\par \par The retro-cardial and retero-sternal spaces are clear.\par \par Both the lung fields are equally translucent.\par \par The posterior and lateral costo-phrenic angles are clear.\par \par No hilar or mediastinal mass is seen.\par \par Domes of diaphragm are normal in position and contour.\par \par The cardiac outline is normal.\par \par No obvious skeletal abnormality is seen.\par \par Impression:\par \par Right effusion, small in size, pulm edema \par

## 2023-06-19 ENCOUNTER — APPOINTMENT (OUTPATIENT)
Dept: PULMONOLOGY | Facility: CLINIC | Age: 78
End: 2023-06-19
Payer: MEDICARE

## 2023-06-19 VITALS
OXYGEN SATURATION: 97 % | WEIGHT: 185 LBS | HEART RATE: 97 BPM | SYSTOLIC BLOOD PRESSURE: 130 MMHG | DIASTOLIC BLOOD PRESSURE: 84 MMHG | BODY MASS INDEX: 29.73 KG/M2 | HEIGHT: 66 IN

## 2023-06-19 PROCEDURE — 99213 OFFICE O/P EST LOW 20 MIN: CPT | Mod: 25

## 2023-06-19 PROCEDURE — 71046 X-RAY EXAM CHEST 2 VIEWS: CPT

## 2023-06-19 NOTE — PROCEDURE
[FreeTextEntry1] : Chest PA and Lateral View\par \par Reason: \par \par Shortness of breath and cough\par \par Findings:\par \par The retro-cardial and retero-sternal spaces are clear.\par \par Both the lung fields are equally translucent.\par \par The posterior and lateral costo-phrenic angles are clear.\par \par No hilar or mediastinal mass is seen.\par \par Domes of diaphragm are normal in position and contour.\par \par The cardiac outline is normal.\par \par No obvious skeletal abnormality is seen.\par \par Impression:\par \par No effusions; pulm vac congestion has improved \par

## 2023-06-19 NOTE — HISTORY OF PRESENT ILLNESS
[TextBox_4] : 77-year-old man with history of heart failure, AICD placement, on oral diuretics twice daily, follows regularly with his cardiologist, recent admission to the hospital with CHF exacerbation, pulmonary edema, pleural effusion status postthoracentesis yielding transudative fluid, discharged on home oxygen and most recently his O2 saturation on room air has been adequate, presenting for follow-up.  On exam today he still has edema in the lower extremities, lungs are clear.  He states that he has gained weight, and feels edematous.  He does have occasional wheezing.  He is a former smoker but quit a long time ago.\par \par 5/9/23: Continues to be short of breath. Swelling in the legs as well. No cough and no wheezing. Still has crackles on exam. CXR with right effusion; too small to tap; Will follow in 1 month and do bedside US if possible. Spoke to cardiology and we ll continue high dose diuretics for now. \par \par 6/19/23: CXR repeated today; still has some congestion but overall much better. No effusions. Remains short of breath. No wheezing on exam. No cough. Edema in the lower extremities. Following with cardiology and is on high dose diuretics for his advanced heart failure. Will order albuter nebs to see if that helps. Advised to stop if HR increases.

## 2023-07-28 ENCOUNTER — APPOINTMENT (OUTPATIENT)
Dept: ELECTROPHYSIOLOGY | Facility: CLINIC | Age: 78
End: 2023-07-28
Payer: MEDICARE

## 2023-07-28 VITALS
WEIGHT: 185 LBS | BODY MASS INDEX: 29.73 KG/M2 | HEART RATE: 61 BPM | HEIGHT: 66 IN | SYSTOLIC BLOOD PRESSURE: 105 MMHG | TEMPERATURE: 98 F | DIASTOLIC BLOOD PRESSURE: 70 MMHG

## 2023-07-28 DIAGNOSIS — R20.0 ANESTHESIA OF SKIN: ICD-10-CM

## 2023-07-28 DIAGNOSIS — R20.2 ANESTHESIA OF SKIN: ICD-10-CM

## 2023-07-28 PROCEDURE — 99214 OFFICE O/P EST MOD 30 MIN: CPT

## 2023-07-28 PROCEDURE — 93290 INTERROG DEV EVAL ICPMS IP: CPT

## 2023-07-28 PROCEDURE — 93284 PRGRMG EVAL IMPLANTABLE DFB: CPT

## 2023-07-28 RX ORDER — MYCOPHENOLATE MOFETIL 200 MG/ML
200 POWDER, FOR SUSPENSION ORAL
Refills: 0 | Status: ACTIVE | COMMUNITY

## 2023-07-28 RX ORDER — ISOSORBIDE MONONITRATE 30 MG
30 TABLET, EXTENDED RELEASE 24 HR ORAL DAILY
Refills: 0 | Status: COMPLETED | COMMUNITY
End: 2023-07-28

## 2023-07-28 RX ORDER — METOPROLOL TARTRATE 50 MG/1
50 TABLET, FILM COATED ORAL DAILY
Refills: 0 | Status: COMPLETED | COMMUNITY
End: 2023-07-28

## 2023-07-28 RX ORDER — OMEPRAZOLE 40 MG/1
40 CAPSULE, DELAYED RELEASE ORAL
Qty: 1 | Refills: 3 | Status: ACTIVE | COMMUNITY

## 2023-07-28 RX ORDER — SACUBITRIL AND VALSARTAN 24; 26 MG/1; MG/1
24-26 TABLET, FILM COATED ORAL TWICE DAILY
Qty: 28 | Refills: 1 | Status: COMPLETED | COMMUNITY
End: 2023-07-28

## 2023-07-28 RX ORDER — FAMOTIDINE 40 MG/1
40 TABLET, FILM COATED ORAL DAILY
Qty: 30 | Refills: 0 | Status: COMPLETED | COMMUNITY
End: 2023-07-28

## 2023-07-28 RX ORDER — THIAMINE HCL 100 MG
TABLET ORAL
Refills: 0 | Status: ACTIVE | COMMUNITY

## 2023-07-28 NOTE — PHYSICAL EXAM
[General Appearance - Well Developed] : well developed [Normal Appearance] : normal appearance [Well Groomed] : well groomed [General Appearance - Well Nourished] : well nourished [No Deformities] : no deformities [General Appearance - In No Acute Distress] : no acute distress [Heart Rate And Rhythm] : heart rate and rhythm were normal [Heart Sounds] : normal S1 and S2 [Murmurs] : no murmurs present [] : no respiratory distress [Respiration, Rhythm And Depth] : normal respiratory rhythm and effort [Exaggerated Use Of Accessory Muscles For Inspiration] : no accessory muscle use [Auscultation Breath Sounds / Voice Sounds] : lungs were clear to auscultation bilaterally [Left Infraclavicular] : left infraclavicular area [Clean] : clean [Dry] : dry [Well-Healed] : well-healed [Bowel Sounds] : normal bowel sounds [Abdomen Soft] : soft [Nail Clubbing] : no clubbing of the fingernails [Cyanosis, Localized] : no localized cyanosis

## 2023-08-05 NOTE — HISTORY OF PRESENT ILLNESS
[de-identified] : \par Cardiologist: Dr. Godinez \par \par 79 y/o male with a BiV AICD for ICM returning for routine device interrogation. \par \par Denies chest pain, shortness of breath, dizziness, near syncope or syncope. \par \par Patient believed he had a shock in October, however checking the device showed no shocks since implant of the device. \par  \par Patient comes today for routine follow-up. Patient was recently hospitalized for heart failure and required diuresis.

## 2023-08-05 NOTE — ADDENDUM
[FreeTextEntry1] : IKristan assisted in documentation on 07/28/2023   acting as a scribe for Dr. Petar Camara.\par

## 2023-08-05 NOTE — PROCEDURE
[No] : not [NSR] : normal sinus rhythm [See Scanned Paceart Report] : See scanned paceart report [See Device Printout] : See device printout [CRT-D] : Cardiac resynchronization therapy defibrillator [DDD] : DDD [Impedance: ___ Ohms] : current cell impedance is [unfilled] Ohms [Charge Time: ___ sec] : charge time was [unfilled] seconds [Longevity: ___ months] : The estimated remaining battery life is [unfilled] months [Normal] : The battery status is normal. [Threshold Testing Performed] : Threshold testing was performed [Atrial] : Atrial [Ventricular] : Ventricular [Sensing Amplitude ___mv] : sensing amplitude was [unfilled] mv [Lead Imp:  ___ohms] : lead impedance was [unfilled] ohms [___V @] : [unfilled] V [___ ms] : [unfilled] ms [Programmed for Longevity] : output reprogrammed for improved battery longevity [Asense-Vsense ___ %] : Asense-Vsense [unfilled]% [de-identified] : ABBOTT / FAUSTINO [de-identified] : [id number] [de-identified] : [id number] [de-identified] : 6/2/2016 [de-identified] : 60 [de-identified] : AP: 1.4%\par : 98%\par 17 AMS episode most recent 7/17 lasting 1 hour 22 minutes, longest lasting 7 hours 51 minutes on 2/11\par 1 NSVT lasting 6s on 11/24/2022\par Corevue is stable\par pt on Remote Monitoring

## 2023-08-05 NOTE — ASSESSMENT
[FreeTextEntry1] : Chronic systolic heart failure - normal BiV ICD function\par - Continue Entresto 49-51mg BID. \par - Continue Metoprolol 25mg at night, 50mg during the day.\par \par Atrial fibrillation\par - Patient is doing great.\par - Patient has 0 episodes of atrial fibrillation.\par - Continue Eliquis 2.5mg Q12. No bleeding. \par \par ·	I have spent 35 minutes of time on the encounter excluding separately reported services. \par \par

## 2023-08-23 ENCOUNTER — APPOINTMENT (OUTPATIENT)
Dept: PULMONOLOGY | Facility: CLINIC | Age: 78
End: 2023-08-23

## 2023-10-17 ENCOUNTER — INPATIENT (INPATIENT)
Facility: HOSPITAL | Age: 78
LOS: 6 days | Discharge: HOME HEALTH | DRG: 291 | End: 2023-10-24
Attending: STUDENT IN AN ORGANIZED HEALTH CARE EDUCATION/TRAINING PROGRAM | Admitting: INTERNAL MEDICINE
Payer: MEDICARE

## 2023-10-17 VITALS
DIASTOLIC BLOOD PRESSURE: 73 MMHG | OXYGEN SATURATION: 98 % | TEMPERATURE: 98 F | SYSTOLIC BLOOD PRESSURE: 133 MMHG | WEIGHT: 186.95 LBS | RESPIRATION RATE: 21 BRPM | HEART RATE: 90 BPM

## 2023-10-17 DIAGNOSIS — I50.9 HEART FAILURE, UNSPECIFIED: ICD-10-CM

## 2023-10-17 DIAGNOSIS — Z95.0 PRESENCE OF CARDIAC PACEMAKER: Chronic | ICD-10-CM

## 2023-10-17 DIAGNOSIS — Z95.810 PRESENCE OF AUTOMATIC (IMPLANTABLE) CARDIAC DEFIBRILLATOR: Chronic | ICD-10-CM

## 2023-10-17 DIAGNOSIS — Z95.1 PRESENCE OF AORTOCORONARY BYPASS GRAFT: Chronic | ICD-10-CM

## 2023-10-17 LAB
ALBUMIN SERPL ELPH-MCNC: 3.7 G/DL — SIGNIFICANT CHANGE UP (ref 3.5–5.2)
ALBUMIN SERPL ELPH-MCNC: 3.7 G/DL — SIGNIFICANT CHANGE UP (ref 3.5–5.2)
ALP SERPL-CCNC: 50 U/L — SIGNIFICANT CHANGE UP (ref 30–115)
ALP SERPL-CCNC: 50 U/L — SIGNIFICANT CHANGE UP (ref 30–115)
ALT FLD-CCNC: 12 U/L — SIGNIFICANT CHANGE UP (ref 0–41)
ALT FLD-CCNC: 12 U/L — SIGNIFICANT CHANGE UP (ref 0–41)
ANION GAP SERPL CALC-SCNC: 10 MMOL/L — SIGNIFICANT CHANGE UP (ref 7–14)
ANION GAP SERPL CALC-SCNC: 10 MMOL/L — SIGNIFICANT CHANGE UP (ref 7–14)
ANION GAP SERPL CALC-SCNC: 11 MMOL/L — SIGNIFICANT CHANGE UP (ref 7–14)
ANION GAP SERPL CALC-SCNC: 11 MMOL/L — SIGNIFICANT CHANGE UP (ref 7–14)
APTT BLD: 43.9 SEC — HIGH (ref 27–39.2)
APTT BLD: 43.9 SEC — HIGH (ref 27–39.2)
AST SERPL-CCNC: 37 U/L — SIGNIFICANT CHANGE UP (ref 0–41)
AST SERPL-CCNC: 37 U/L — SIGNIFICANT CHANGE UP (ref 0–41)
BASOPHILS # BLD AUTO: 0.04 K/UL — SIGNIFICANT CHANGE UP (ref 0–0.2)
BASOPHILS # BLD AUTO: 0.04 K/UL — SIGNIFICANT CHANGE UP (ref 0–0.2)
BASOPHILS NFR BLD AUTO: 0.4 % — SIGNIFICANT CHANGE UP (ref 0–1)
BASOPHILS NFR BLD AUTO: 0.4 % — SIGNIFICANT CHANGE UP (ref 0–1)
BILIRUB SERPL-MCNC: 0.4 MG/DL — SIGNIFICANT CHANGE UP (ref 0.2–1.2)
BILIRUB SERPL-MCNC: 0.4 MG/DL — SIGNIFICANT CHANGE UP (ref 0.2–1.2)
BUN SERPL-MCNC: 36 MG/DL — HIGH (ref 10–20)
BUN SERPL-MCNC: 36 MG/DL — HIGH (ref 10–20)
BUN SERPL-MCNC: 39 MG/DL — HIGH (ref 10–20)
BUN SERPL-MCNC: 39 MG/DL — HIGH (ref 10–20)
CALCIUM SERPL-MCNC: 9 MG/DL — SIGNIFICANT CHANGE UP (ref 8.4–10.5)
CALCIUM SERPL-MCNC: 9 MG/DL — SIGNIFICANT CHANGE UP (ref 8.4–10.5)
CALCIUM SERPL-MCNC: 9.6 MG/DL — SIGNIFICANT CHANGE UP (ref 8.4–10.5)
CALCIUM SERPL-MCNC: 9.6 MG/DL — SIGNIFICANT CHANGE UP (ref 8.4–10.5)
CHLORIDE SERPL-SCNC: 105 MMOL/L — SIGNIFICANT CHANGE UP (ref 98–110)
CHLORIDE SERPL-SCNC: 105 MMOL/L — SIGNIFICANT CHANGE UP (ref 98–110)
CHLORIDE SERPL-SCNC: 107 MMOL/L — SIGNIFICANT CHANGE UP (ref 98–110)
CHLORIDE SERPL-SCNC: 107 MMOL/L — SIGNIFICANT CHANGE UP (ref 98–110)
CO2 SERPL-SCNC: 25 MMOL/L — SIGNIFICANT CHANGE UP (ref 17–32)
CO2 SERPL-SCNC: 25 MMOL/L — SIGNIFICANT CHANGE UP (ref 17–32)
CO2 SERPL-SCNC: 27 MMOL/L — SIGNIFICANT CHANGE UP (ref 17–32)
CO2 SERPL-SCNC: 27 MMOL/L — SIGNIFICANT CHANGE UP (ref 17–32)
CREAT SERPL-MCNC: 1.5 MG/DL — SIGNIFICANT CHANGE UP (ref 0.7–1.5)
CREAT SERPL-MCNC: 1.5 MG/DL — SIGNIFICANT CHANGE UP (ref 0.7–1.5)
CREAT SERPL-MCNC: 1.6 MG/DL — HIGH (ref 0.7–1.5)
CREAT SERPL-MCNC: 1.6 MG/DL — HIGH (ref 0.7–1.5)
EGFR: 44 ML/MIN/1.73M2 — LOW
EGFR: 44 ML/MIN/1.73M2 — LOW
EGFR: 48 ML/MIN/1.73M2 — LOW
EGFR: 48 ML/MIN/1.73M2 — LOW
EOSINOPHIL # BLD AUTO: 0.28 K/UL — SIGNIFICANT CHANGE UP (ref 0–0.7)
EOSINOPHIL # BLD AUTO: 0.28 K/UL — SIGNIFICANT CHANGE UP (ref 0–0.7)
EOSINOPHIL NFR BLD AUTO: 3.1 % — SIGNIFICANT CHANGE UP (ref 0–8)
EOSINOPHIL NFR BLD AUTO: 3.1 % — SIGNIFICANT CHANGE UP (ref 0–8)
GAS PNL BLDV: SIGNIFICANT CHANGE UP
GAS PNL BLDV: SIGNIFICANT CHANGE UP
GLUCOSE BLDC GLUCOMTR-MCNC: 220 MG/DL — HIGH (ref 70–99)
GLUCOSE BLDC GLUCOMTR-MCNC: 220 MG/DL — HIGH (ref 70–99)
GLUCOSE SERPL-MCNC: 146 MG/DL — HIGH (ref 70–99)
GLUCOSE SERPL-MCNC: 146 MG/DL — HIGH (ref 70–99)
GLUCOSE SERPL-MCNC: 89 MG/DL — SIGNIFICANT CHANGE UP (ref 70–99)
GLUCOSE SERPL-MCNC: 89 MG/DL — SIGNIFICANT CHANGE UP (ref 70–99)
HCT VFR BLD CALC: 38.2 % — LOW (ref 42–52)
HCT VFR BLD CALC: 38.2 % — LOW (ref 42–52)
HGB BLD-MCNC: 12 G/DL — LOW (ref 14–18)
HGB BLD-MCNC: 12 G/DL — LOW (ref 14–18)
IMM GRANULOCYTES NFR BLD AUTO: 0.2 % — SIGNIFICANT CHANGE UP (ref 0.1–0.3)
IMM GRANULOCYTES NFR BLD AUTO: 0.2 % — SIGNIFICANT CHANGE UP (ref 0.1–0.3)
INR BLD: 1.65 RATIO — HIGH (ref 0.65–1.3)
INR BLD: 1.65 RATIO — HIGH (ref 0.65–1.3)
LYMPHOCYTES # BLD AUTO: 2.15 K/UL — SIGNIFICANT CHANGE UP (ref 1.2–3.4)
LYMPHOCYTES # BLD AUTO: 2.15 K/UL — SIGNIFICANT CHANGE UP (ref 1.2–3.4)
LYMPHOCYTES # BLD AUTO: 23.7 % — SIGNIFICANT CHANGE UP (ref 20.5–51.1)
LYMPHOCYTES # BLD AUTO: 23.7 % — SIGNIFICANT CHANGE UP (ref 20.5–51.1)
MAGNESIUM SERPL-MCNC: 1.9 MG/DL — SIGNIFICANT CHANGE UP (ref 1.8–2.4)
MAGNESIUM SERPL-MCNC: 1.9 MG/DL — SIGNIFICANT CHANGE UP (ref 1.8–2.4)
MCHC RBC-ENTMCNC: 27.8 PG — SIGNIFICANT CHANGE UP (ref 27–31)
MCHC RBC-ENTMCNC: 27.8 PG — SIGNIFICANT CHANGE UP (ref 27–31)
MCHC RBC-ENTMCNC: 31.4 G/DL — LOW (ref 32–37)
MCHC RBC-ENTMCNC: 31.4 G/DL — LOW (ref 32–37)
MCV RBC AUTO: 88.4 FL — SIGNIFICANT CHANGE UP (ref 80–94)
MCV RBC AUTO: 88.4 FL — SIGNIFICANT CHANGE UP (ref 80–94)
MONOCYTES # BLD AUTO: 0.65 K/UL — HIGH (ref 0.1–0.6)
MONOCYTES # BLD AUTO: 0.65 K/UL — HIGH (ref 0.1–0.6)
MONOCYTES NFR BLD AUTO: 7.2 % — SIGNIFICANT CHANGE UP (ref 1.7–9.3)
MONOCYTES NFR BLD AUTO: 7.2 % — SIGNIFICANT CHANGE UP (ref 1.7–9.3)
NEUTROPHILS # BLD AUTO: 5.95 K/UL — SIGNIFICANT CHANGE UP (ref 1.4–6.5)
NEUTROPHILS # BLD AUTO: 5.95 K/UL — SIGNIFICANT CHANGE UP (ref 1.4–6.5)
NEUTROPHILS NFR BLD AUTO: 65.4 % — SIGNIFICANT CHANGE UP (ref 42.2–75.2)
NEUTROPHILS NFR BLD AUTO: 65.4 % — SIGNIFICANT CHANGE UP (ref 42.2–75.2)
NRBC # BLD: 0 /100 WBCS — SIGNIFICANT CHANGE UP (ref 0–0)
NRBC # BLD: 0 /100 WBCS — SIGNIFICANT CHANGE UP (ref 0–0)
NT-PROBNP SERPL-SCNC: 8598 PG/ML — HIGH (ref 0–300)
NT-PROBNP SERPL-SCNC: 8598 PG/ML — HIGH (ref 0–300)
PLATELET # BLD AUTO: 240 K/UL — SIGNIFICANT CHANGE UP (ref 130–400)
PLATELET # BLD AUTO: 240 K/UL — SIGNIFICANT CHANGE UP (ref 130–400)
PMV BLD: 11.3 FL — HIGH (ref 7.4–10.4)
PMV BLD: 11.3 FL — HIGH (ref 7.4–10.4)
POTASSIUM SERPL-MCNC: 4 MMOL/L — SIGNIFICANT CHANGE UP (ref 3.5–5)
POTASSIUM SERPL-MCNC: 4 MMOL/L — SIGNIFICANT CHANGE UP (ref 3.5–5)
POTASSIUM SERPL-MCNC: 5.4 MMOL/L — HIGH (ref 3.5–5)
POTASSIUM SERPL-MCNC: 5.4 MMOL/L — HIGH (ref 3.5–5)
POTASSIUM SERPL-SCNC: 4 MMOL/L — SIGNIFICANT CHANGE UP (ref 3.5–5)
POTASSIUM SERPL-SCNC: 4 MMOL/L — SIGNIFICANT CHANGE UP (ref 3.5–5)
POTASSIUM SERPL-SCNC: 5.4 MMOL/L — HIGH (ref 3.5–5)
POTASSIUM SERPL-SCNC: 5.4 MMOL/L — HIGH (ref 3.5–5)
PROT SERPL-MCNC: 6.1 G/DL — SIGNIFICANT CHANGE UP (ref 6–8)
PROT SERPL-MCNC: 6.1 G/DL — SIGNIFICANT CHANGE UP (ref 6–8)
PROTHROM AB SERPL-ACNC: 19.1 SEC — HIGH (ref 9.95–12.87)
PROTHROM AB SERPL-ACNC: 19.1 SEC — HIGH (ref 9.95–12.87)
RBC # BLD: 4.32 M/UL — LOW (ref 4.7–6.1)
RBC # BLD: 4.32 M/UL — LOW (ref 4.7–6.1)
RBC # FLD: 14.2 % — SIGNIFICANT CHANGE UP (ref 11.5–14.5)
RBC # FLD: 14.2 % — SIGNIFICANT CHANGE UP (ref 11.5–14.5)
SODIUM SERPL-SCNC: 140 MMOL/L — SIGNIFICANT CHANGE UP (ref 135–146)
SODIUM SERPL-SCNC: 140 MMOL/L — SIGNIFICANT CHANGE UP (ref 135–146)
SODIUM SERPL-SCNC: 145 MMOL/L — SIGNIFICANT CHANGE UP (ref 135–146)
SODIUM SERPL-SCNC: 145 MMOL/L — SIGNIFICANT CHANGE UP (ref 135–146)
TROPONIN T SERPL-MCNC: 0.02 NG/ML — HIGH
TROPONIN T SERPL-MCNC: 0.02 NG/ML — HIGH
WBC # BLD: 9.09 K/UL — SIGNIFICANT CHANGE UP (ref 4.8–10.8)
WBC # BLD: 9.09 K/UL — SIGNIFICANT CHANGE UP (ref 4.8–10.8)
WBC # FLD AUTO: 9.09 K/UL — SIGNIFICANT CHANGE UP (ref 4.8–10.8)
WBC # FLD AUTO: 9.09 K/UL — SIGNIFICANT CHANGE UP (ref 4.8–10.8)

## 2023-10-17 PROCEDURE — 82962 GLUCOSE BLOOD TEST: CPT

## 2023-10-17 PROCEDURE — 84295 ASSAY OF SERUM SODIUM: CPT

## 2023-10-17 PROCEDURE — 93306 TTE W/DOPPLER COMPLETE: CPT

## 2023-10-17 PROCEDURE — 84132 ASSAY OF SERUM POTASSIUM: CPT

## 2023-10-17 PROCEDURE — 93308 TTE F-UP OR LMTD: CPT | Mod: 26

## 2023-10-17 PROCEDURE — 99222 1ST HOSP IP/OBS MODERATE 55: CPT

## 2023-10-17 PROCEDURE — 83605 ASSAY OF LACTIC ACID: CPT

## 2023-10-17 PROCEDURE — 93005 ELECTROCARDIOGRAM TRACING: CPT

## 2023-10-17 PROCEDURE — 82330 ASSAY OF CALCIUM: CPT

## 2023-10-17 PROCEDURE — 85018 HEMOGLOBIN: CPT

## 2023-10-17 PROCEDURE — 83036 HEMOGLOBIN GLYCOSYLATED A1C: CPT

## 2023-10-17 PROCEDURE — 85025 COMPLETE CBC W/AUTO DIFF WBC: CPT

## 2023-10-17 PROCEDURE — 84484 ASSAY OF TROPONIN QUANT: CPT

## 2023-10-17 PROCEDURE — 80048 BASIC METABOLIC PNL TOTAL CA: CPT

## 2023-10-17 PROCEDURE — 80053 COMPREHEN METABOLIC PANEL: CPT

## 2023-10-17 PROCEDURE — 71045 X-RAY EXAM CHEST 1 VIEW: CPT

## 2023-10-17 PROCEDURE — 82803 BLOOD GASES ANY COMBINATION: CPT

## 2023-10-17 PROCEDURE — 93308 TTE F-UP OR LMTD: CPT

## 2023-10-17 PROCEDURE — 71045 X-RAY EXAM CHEST 1 VIEW: CPT | Mod: 26

## 2023-10-17 PROCEDURE — 83735 ASSAY OF MAGNESIUM: CPT

## 2023-10-17 PROCEDURE — 99285 EMERGENCY DEPT VISIT HI MDM: CPT

## 2023-10-17 PROCEDURE — 93010 ELECTROCARDIOGRAM REPORT: CPT | Mod: 77

## 2023-10-17 PROCEDURE — 97162 PT EVAL MOD COMPLEX 30 MIN: CPT | Mod: GP

## 2023-10-17 PROCEDURE — 76604 US EXAM CHEST: CPT

## 2023-10-17 PROCEDURE — 84443 ASSAY THYROID STIM HORMONE: CPT

## 2023-10-17 PROCEDURE — 80061 LIPID PANEL: CPT

## 2023-10-17 PROCEDURE — 93284 PRGRMG EVAL IMPLANTABLE DFB: CPT

## 2023-10-17 PROCEDURE — 93010 ELECTROCARDIOGRAM REPORT: CPT

## 2023-10-17 PROCEDURE — 36415 COLL VENOUS BLD VENIPUNCTURE: CPT

## 2023-10-17 PROCEDURE — 76604 US EXAM CHEST: CPT | Mod: 26

## 2023-10-17 PROCEDURE — 85014 HEMATOCRIT: CPT

## 2023-10-17 RX ORDER — CALCIUM GLUCONATE 100 MG/ML
1 VIAL (ML) INTRAVENOUS ONCE
Refills: 0 | Status: COMPLETED | OUTPATIENT
Start: 2023-10-17 | End: 2023-10-17

## 2023-10-17 RX ORDER — VERICIGUAT 2.5 MG/1
1 TABLET, FILM COATED ORAL
Refills: 0 | DISCHARGE

## 2023-10-17 RX ORDER — RANOLAZINE 500 MG/1
500 TABLET, FILM COATED, EXTENDED RELEASE ORAL
Refills: 0 | Status: DISCONTINUED | OUTPATIENT
Start: 2023-10-17 | End: 2023-10-24

## 2023-10-17 RX ORDER — INSULIN GLARGINE 100 [IU]/ML
12 INJECTION, SOLUTION SUBCUTANEOUS
Refills: 0 | DISCHARGE

## 2023-10-17 RX ORDER — LEVOTHYROXINE SODIUM 125 MCG
1 TABLET ORAL
Qty: 0 | Refills: 0 | DISCHARGE

## 2023-10-17 RX ORDER — GLUCAGON INJECTION, SOLUTION 0.5 MG/.1ML
1 INJECTION, SOLUTION SUBCUTANEOUS ONCE
Refills: 0 | Status: DISCONTINUED | OUTPATIENT
Start: 2023-10-17 | End: 2023-10-24

## 2023-10-17 RX ORDER — METOPROLOL TARTRATE 50 MG
37.5 TABLET ORAL
Refills: 0 | Status: DISCONTINUED | OUTPATIENT
Start: 2023-10-17 | End: 2023-10-17

## 2023-10-17 RX ORDER — SODIUM CHLORIDE 9 MG/ML
1000 INJECTION, SOLUTION INTRAVENOUS
Refills: 0 | Status: DISCONTINUED | OUTPATIENT
Start: 2023-10-17 | End: 2023-10-19

## 2023-10-17 RX ORDER — INSULIN HUMAN 100 [IU]/ML
10 INJECTION, SOLUTION SUBCUTANEOUS ONCE
Refills: 0 | Status: COMPLETED | OUTPATIENT
Start: 2023-10-17 | End: 2023-10-17

## 2023-10-17 RX ORDER — BUMETANIDE 0.25 MG/ML
2 INJECTION INTRAMUSCULAR; INTRAVENOUS
Refills: 0 | Status: COMPLETED | OUTPATIENT
Start: 2023-10-17 | End: 2023-10-20

## 2023-10-17 RX ORDER — ASPIRIN/CALCIUM CARB/MAGNESIUM 324 MG
1 TABLET ORAL
Qty: 0 | Refills: 0 | DISCHARGE

## 2023-10-17 RX ORDER — BUMETANIDE 0.25 MG/ML
1 INJECTION INTRAMUSCULAR; INTRAVENOUS ONCE
Refills: 0 | Status: COMPLETED | OUTPATIENT
Start: 2023-10-17 | End: 2023-10-17

## 2023-10-17 RX ORDER — LEVOTHYROXINE SODIUM 125 MCG
1 TABLET ORAL
Refills: 0 | DISCHARGE

## 2023-10-17 RX ORDER — PANTOPRAZOLE SODIUM 20 MG/1
40 TABLET, DELAYED RELEASE ORAL
Refills: 0 | Status: DISCONTINUED | OUTPATIENT
Start: 2023-10-17 | End: 2023-10-24

## 2023-10-17 RX ORDER — DEXTROSE 50 % IN WATER 50 %
15 SYRINGE (ML) INTRAVENOUS ONCE
Refills: 0 | Status: DISCONTINUED | OUTPATIENT
Start: 2023-10-17 | End: 2023-10-19

## 2023-10-17 RX ORDER — ATORVASTATIN CALCIUM 80 MG/1
40 TABLET, FILM COATED ORAL AT BEDTIME
Refills: 0 | Status: DISCONTINUED | OUTPATIENT
Start: 2023-10-17 | End: 2023-10-24

## 2023-10-17 RX ORDER — MYCOPHENOLATE MOFETIL 250 MG/1
2.5 CAPSULE ORAL
Refills: 0 | DISCHARGE

## 2023-10-17 RX ORDER — DEXTROSE 50 % IN WATER 50 %
50 SYRINGE (ML) INTRAVENOUS ONCE
Refills: 0 | Status: COMPLETED | OUTPATIENT
Start: 2023-10-17 | End: 2023-10-17

## 2023-10-17 RX ORDER — DEXTROSE 50 % IN WATER 50 %
25 SYRINGE (ML) INTRAVENOUS ONCE
Refills: 0 | Status: DISCONTINUED | OUTPATIENT
Start: 2023-10-17 | End: 2023-10-19

## 2023-10-17 RX ORDER — INSULIN LISPRO 100/ML
VIAL (ML) SUBCUTANEOUS
Refills: 0 | Status: DISCONTINUED | OUTPATIENT
Start: 2023-10-17 | End: 2023-10-24

## 2023-10-17 RX ORDER — LEVOTHYROXINE SODIUM 125 MCG
175 TABLET ORAL DAILY
Refills: 0 | Status: DISCONTINUED | OUTPATIENT
Start: 2023-10-17 | End: 2023-10-24

## 2023-10-17 RX ORDER — INSULIN GLARGINE 100 [IU]/ML
12 INJECTION, SOLUTION SUBCUTANEOUS AT BEDTIME
Refills: 0 | Status: DISCONTINUED | OUTPATIENT
Start: 2023-10-17 | End: 2023-10-24

## 2023-10-17 RX ORDER — SACUBITRIL AND VALSARTAN 24; 26 MG/1; MG/1
1 TABLET, FILM COATED ORAL
Refills: 0 | Status: DISCONTINUED | OUTPATIENT
Start: 2023-10-17 | End: 2023-10-24

## 2023-10-17 RX ORDER — FENOFIBRATE,MICRONIZED 130 MG
145 CAPSULE ORAL DAILY
Refills: 0 | Status: DISCONTINUED | OUTPATIENT
Start: 2023-10-17 | End: 2023-10-24

## 2023-10-17 RX ORDER — TAMSULOSIN HYDROCHLORIDE 0.4 MG/1
0.4 CAPSULE ORAL AT BEDTIME
Refills: 0 | Status: DISCONTINUED | OUTPATIENT
Start: 2023-10-17 | End: 2023-10-24

## 2023-10-17 RX ORDER — APIXABAN 2.5 MG/1
5 TABLET, FILM COATED ORAL EVERY 12 HOURS
Refills: 0 | Status: DISCONTINUED | OUTPATIENT
Start: 2023-10-17 | End: 2023-10-18

## 2023-10-17 RX ORDER — DEXTROSE 50 % IN WATER 50 %
12.5 SYRINGE (ML) INTRAVENOUS ONCE
Refills: 0 | Status: DISCONTINUED | OUTPATIENT
Start: 2023-10-17 | End: 2023-10-19

## 2023-10-17 RX ADMIN — BUMETANIDE 1 MILLIGRAM(S): 0.25 INJECTION INTRAMUSCULAR; INTRAVENOUS at 15:07

## 2023-10-17 RX ADMIN — Medication 100 GRAM(S): at 19:58

## 2023-10-17 RX ADMIN — BUMETANIDE 2 MILLIGRAM(S): 0.25 INJECTION INTRAMUSCULAR; INTRAVENOUS at 20:18

## 2023-10-17 RX ADMIN — RANOLAZINE 500 MILLIGRAM(S): 500 TABLET, FILM COATED, EXTENDED RELEASE ORAL at 21:46

## 2023-10-17 RX ADMIN — ATORVASTATIN CALCIUM 40 MILLIGRAM(S): 80 TABLET, FILM COATED ORAL at 21:46

## 2023-10-17 RX ADMIN — Medication 50 MILLILITER(S): at 18:42

## 2023-10-17 RX ADMIN — APIXABAN 5 MILLIGRAM(S): 2.5 TABLET, FILM COATED ORAL at 20:00

## 2023-10-17 RX ADMIN — INSULIN HUMAN 10 UNIT(S): 100 INJECTION, SOLUTION SUBCUTANEOUS at 19:17

## 2023-10-17 RX ADMIN — SACUBITRIL AND VALSARTAN 1 TABLET(S): 24; 26 TABLET, FILM COATED ORAL at 21:45

## 2023-10-17 NOTE — ED PROVIDER NOTE - ATTENDING APP SHARED VISIT CONTRIBUTION OF CARE
78-year-old male to ED with chest pain episode.  Patient complaining of shortness of breath and pressure in his chest.  Has a EF of 16%.  Worse with when lying flat.  Also has a history of diabetes CAD and CHF.  No fevers no sick contacts or travels.  Otherwise appears mildly dyspneic.  Also has a pacer and ICD placed.        Discussed with wife.  They have been using Bumex and she has noticed increased fluid retention.    Afebrile vital signs stable exam as noted clear lungs bilaterally conjunctiva pink HEENT normal, regular rate and rhythm abdomen soft nontender and neuro nonfocal.

## 2023-10-17 NOTE — H&P ADULT - ATTENDING COMMENTS
79 y/o male PMH ICM, HFrEF, CAD who presents with ADHF. Patient has been progressively been failing escalation of PO diuretics at home. Plan for admission for IV diuresis and titration of GDMT.

## 2023-10-17 NOTE — ED PROVIDER NOTE - PHYSICAL EXAMINATION
CONST: NAD  ENT: No nasal discharge. Oropharynx normal appearing.   CARD: S1 S2; No jvd  RESP: RLL crackles. No distress  GI: Soft, non-tender, non-distended. normal BS  MS: Normal ROM in all extremities. pulses 2 +. B/L LE edema  SKIN: Warm, dry, no acute rashes. Good turgor  NEURO: A&Ox3, No focal deficits. Strength 5/5 with no sensory deficits.

## 2023-10-17 NOTE — ED PROVIDER NOTE - CLINICAL SUMMARY MEDICAL DECISION MAKING FREE TEXT BOX
Extensive discussion of the details of case with private cardiologist will admit to heart failure unit for further evaluation.

## 2023-10-17 NOTE — CONSULT NOTE ADULT - ASSESSMENT
#Acute on Chronic HFrEF EF s/p ICD    - EF 16% on TTE 3/2023    - Lactate 0.9, normal LFTs, Cr at baseline    - CXR with bilateral opacities, R pleural effusion, on baseline O2 requirements    - Bedside echo with IVC 2.1 cm, non collapsible  #CAD s/p CABG 2016  #Chronic AFib on Eliquis  #HLD, HTN, DM  #Hypothyroidism    Plan  Admit to 4T stepdown  Initiate IV Bumex 2 mg IV BID  Strict I's and O's, daily weights, fluid restriction  C/w Entresto  Hold BB for now

## 2023-10-17 NOTE — H&P ADULT - HISTORY OF PRESENT ILLNESS
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 Patient has worsening shortness of breath x3 weeks, aggravated by lying supine, improved with sitting up.  Associated with bilateral lower extremity swelling. 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 Patient has worsening shortness of breath x3 weeks, aggravated by lying supine, improved with sitting up.  Associated with bilateral lower extremity swelling. Denies fever, headache, chest pain, abdominal pain, weakness, numbness, dysuria, hematuria, vomiting, diarrhea    In ED, vitals are stable  Trops 0.02 (elevated at baseline), Cr 1.6 (bl 1.8), BNP 8598  K+ 5.4 (hemolyzed) but 7.0 on VBG  EKG showed sinus rhythm with PVCs  s/p bumex 1mg IV in ED  Initially admitted under medicine but immediately transferred to cardio stepdown

## 2023-10-17 NOTE — CONSULT NOTE ADULT - SUBJECTIVE AND OBJECTIVE BOX
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 Patient has worsening shortness of breath x3 weeks, aggravated by lying supine, improved with sitting up.  Associated with bilateral lower extremity swelling. Denies fever, headache, chest pain, abdominal pain, weakness, numbness, dysuria, hematuria, vomiting, diarrhea    In ED, vitals are stable  Trops 0.02 (elevated at baseline), Cr 1.6 (bl 1.8), BNP 8598  K+ 5.4 (hemolyzed) but 7.0 on VBG  EKG showed sinus rhythm with PVCs  s/p bumex 1mg IV in ED  Initially admitted under medicine but immediately transferred to cardio stepdown (17 Oct 2023 17:13)    Pt is of Dr. Godinez, has been having increasing SOB for past few weeks, orthopnea.   Reportedly had been increased on oral diuresis by Dr. Godinez with no relief.    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      FAMILY HISTORY:  Family history of sinus bradycardia (Mother)    SOCIAL HISTORY    SUBSTANCE USE  Tobacco Usage:  ( ) None ( ) never smoked   (x ) former smoker  ( ) current smoker; Packs per day:   Alcohol Usage: (x ) none  ( ) occasional ( ) 2-3 times a week ( ) daily; Last drink:   Recreational drugs ( x) None    MEDICATIONS    apixaban 5 milliGRAM(s) Oral every 12 hours  buMETAnide Injectable 2 milliGRAM(s) IV Push two times a day  metoprolol tartrate 37.5 milliGRAM(s) Oral two times a day  ranolazine 500 milliGRAM(s) Oral two times a day  sacubitril 49 mG/valsartan 51 mG 1 Tablet(s) Oral two times a day          pantoprazole    Tablet 40 milliGRAM(s) Oral before breakfast    atorvastatin 40 milliGRAM(s) Oral at bedtime  dextrose 50% Injectable 12.5 Gram(s) IV Push once  dextrose 50% Injectable 25 Gram(s) IV Push once  dextrose 50% Injectable 25 Gram(s) IV Push once  dextrose 50% Injectable 50 milliLiter(s) IV Push once  dextrose Oral Gel 15 Gram(s) Oral once PRN  fenofibrate Tablet 160 milliGRAM(s) Oral daily  glucagon  Injectable 1 milliGRAM(s) IntraMuscular once  insulin glargine Injectable (LANTUS) 12 Unit(s) SubCutaneous at bedtime  insulin lispro (ADMELOG) corrective regimen sliding scale   SubCutaneous three times a day before meals  insulin regular  human recombinant 10 Unit(s) IV Push once  levothyroxine 175 MICROGram(s) Oral daily    calcium gluconate IVPB 1 Gram(s) IV Intermittent once  dextrose 5%. 1000 milliLiter(s) IV Continuous <Continuous>  dextrose 5%. 1000 milliLiter(s) IV Continuous <Continuous>  tamsulosin 0.4 milliGRAM(s) Oral at bedtime              Drug Dosing Weight  Height (cm): 167.6 (07 Mar 2023 22:30)  Weight (kg): 84.8 (17 Oct 2023 11:15)  BMI (kg/m2): 30.2 (17 Oct 2023 11:15)  BSA (m2): 1.94 (17 Oct 2023 11:15)  Daily     Daily             LABORATORY VALUES	 	                          12.0   9.09  )-----------( 240      ( 17 Oct 2023 13:15 )             38.2       10-17    140  |  105  |  39<H>  ----------------------------<  146<H>  5.4<H>   |  25  |  1.6<H>    Ca    9.0      17 Oct 2023 13:15  Mg     1.9     10-17    TPro  6.1  /  Alb  3.7  /  TBili  0.4  /  DBili  x   /  AST  37  /  ALT  12  /  AlkPhos  50  10-17    LIVER FUNCTIONS - ( 17 Oct 2023 13:15 )  Alb: 3.7 g/dL / Pro: 6.1 g/dL / ALK PHOS: 50 U/L / ALT: 12 U/L / AST: 37 U/L / GGT: x           Prothrombin Time, Plasma: 19.10 sec (10-17 @ 13:15)      CARDIAC MARKERS:        Troponin T, Serum: 0.02 ng/mL (10-17 @ 13:15)      Blood Gas Venous - Lactate: 0.90 mmol/L (10-17 @ 16:28)            Urinalysis Basic - ( 17 Oct 2023 13:15 )    Color: x / Appearance: x / SG: x / pH: x  Gluc: 146 mg/dL / Ketone: x  / Bili: x / Urobili: x   Blood: x / Protein: x / Nitrite: x   Leuk Esterase: x / RBC: x / WBC x   Sq Epi: x / Non Sq Epi: x / Bacteria: x    CAPILLARY BLOOD GLUCOSE      ECG: Paced, R BBB  	  PREVIOUS DIAGNOSTIC TESTING:     Echocardiogram: EF 16%, mild MR, TR, GIIDD   Catheterization: 2018: 80% pLAD stenosis s/p PCI w KAYLEN      CONSTITUTIONAL: No fevers, No chills, No fatigue, No weight gain  EYES: No vision changes   ENT: No congestion, No ear pain, No sore throat.  NECK: No pain, No stiffness  RESPIRATORY: + SOB  CARDIOVASCULAR: + chest pain on inspiration  GASTROINTESTINAL: No abdominal pain, No nausea, No vomiting, No hematemesis, No diarrhea No constipation. No melena  GENITOURINARY: No dysuria, No frequency, No incontinence, No hematuria  NEUROLOGICAL: No dizziness, No lightheadedness, No syncope, No LOC, No headache, No numbness or weakness  MUSCULOSKELETAL: No Edema, No joint pain, No joint swelling.  PSYCHIATRIC: No anxiety, No depression  DERMATOLOGY: No diaphoresis. No itching, No rashes, No pressure ulcers  HEME/LYMPH: No easy bruising, or bleeding gums    All other review of systems is negative unless indicated above.    ICU Vital Signs Last 24 Hrs  T(C): 36.5 (17 Oct 2023 15:55), Max: 36.7 (17 Oct 2023 11:15)  T(F): 97.7 (17 Oct 2023 15:55), Max: 98 (17 Oct 2023 11:15)  HR: 68 (17 Oct 2023 15:55) (68 - 90)  BP: 140/76 (17 Oct 2023 15:55) (133/73 - 140/76)  BP(mean): --  ABP: --  ABP(mean): --  RR: 18 (17 Oct 2023 15:55) (18 - 21)  SpO2: 100% (17 Oct 2023 15:55) (98% - 100%)    O2 Parameters below as of 17 Oct 2023 11:15  Patient On (Oxygen Delivery Method): room air      Appearance: Tachypneic, mild distress  Cardiovascular: Regular rate and rhythm, systolic murmur, no JVD  Respiratory: Dec SB in bilateral bases R > L  Gastrointestinal:  Soft, Non-tender, + BS  Neurologic: Non-focal, A&Ox3  Skin: Warm and dry, No rashes, No ecchymosis, No cyanosis  Musculoskeletal: No clubbing, No cyanosis, No edema                                               CCU CONSULTATION    Patient is a 78y old  Male who presents with a chief complaint of     HPI:  78-year-old male with past medical history of CAD, CHF with EF of 16% on AICD and 2-3L home O2 (march 2023), diabetes, afib (on eliquis), hypothyroidism, 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 Patient has worsening shortness of breath x3 weeks, aggravated by lying supine, improved with sitting up.  Associated with bilateral lower extremity swelling. Denies fever, headache, chest pain, abdominal pain, weakness, numbness, dysuria, hematuria, vomiting, diarrhea    In ED, vitals are stable  Trops 0.02 (elevated at baseline), Cr 1.6 (bl 1.8), BNP 8598  K+ 5.4 (hemolyzed) but 7.0 on VBG  EKG showed sinus rhythm with PVCs  s/p bumex 1mg IV in ED  Initially admitted under medicine but immediately transferred to cardio stepdown (17 Oct 2023 17:13)    Pt is of Dr. Godinez, has been having increasing SOB for past few weeks, orthopnea.   Reportedly had been increased on oral diuresis by Dr. Godinez with no relief.    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      FAMILY HISTORY:  Family history of sinus bradycardia (Mother)    SOCIAL HISTORY    SUBSTANCE USE  Tobacco Usage:  ( ) None ( ) never smoked   (x ) former smoker  ( ) current smoker; Packs per day:   Alcohol Usage: (x ) none  ( ) occasional ( ) 2-3 times a week ( ) daily; Last drink:   Recreational drugs ( x) None    MEDICATIONS    apixaban 5 milliGRAM(s) Oral every 12 hours  buMETAnide Injectable 2 milliGRAM(s) IV Push two times a day  metoprolol tartrate 37.5 milliGRAM(s) Oral two times a day  ranolazine 500 milliGRAM(s) Oral two times a day  sacubitril 49 mG/valsartan 51 mG 1 Tablet(s) Oral two times a day          pantoprazole    Tablet 40 milliGRAM(s) Oral before breakfast    atorvastatin 40 milliGRAM(s) Oral at bedtime  dextrose 50% Injectable 12.5 Gram(s) IV Push once  dextrose 50% Injectable 25 Gram(s) IV Push once  dextrose 50% Injectable 25 Gram(s) IV Push once  dextrose 50% Injectable 50 milliLiter(s) IV Push once  dextrose Oral Gel 15 Gram(s) Oral once PRN  fenofibrate Tablet 160 milliGRAM(s) Oral daily  glucagon  Injectable 1 milliGRAM(s) IntraMuscular once  insulin glargine Injectable (LANTUS) 12 Unit(s) SubCutaneous at bedtime  insulin lispro (ADMELOG) corrective regimen sliding scale   SubCutaneous three times a day before meals  insulin regular  human recombinant 10 Unit(s) IV Push once  levothyroxine 175 MICROGram(s) Oral daily    calcium gluconate IVPB 1 Gram(s) IV Intermittent once  dextrose 5%. 1000 milliLiter(s) IV Continuous <Continuous>  dextrose 5%. 1000 milliLiter(s) IV Continuous <Continuous>  tamsulosin 0.4 milliGRAM(s) Oral at bedtime              Drug Dosing Weight  Height (cm): 167.6 (07 Mar 2023 22:30)  Weight (kg): 84.8 (17 Oct 2023 11:15)  BMI (kg/m2): 30.2 (17 Oct 2023 11:15)  BSA (m2): 1.94 (17 Oct 2023 11:15)  Daily     Daily             LABORATORY VALUES	 	                          12.0   9.09  )-----------( 240      ( 17 Oct 2023 13:15 )             38.2       10-17    140  |  105  |  39<H>  ----------------------------<  146<H>  5.4<H>   |  25  |  1.6<H>    Ca    9.0      17 Oct 2023 13:15  Mg     1.9     10-17    TPro  6.1  /  Alb  3.7  /  TBili  0.4  /  DBili  x   /  AST  37  /  ALT  12  /  AlkPhos  50  10-17    LIVER FUNCTIONS - ( 17 Oct 2023 13:15 )  Alb: 3.7 g/dL / Pro: 6.1 g/dL / ALK PHOS: 50 U/L / ALT: 12 U/L / AST: 37 U/L / GGT: x           Prothrombin Time, Plasma: 19.10 sec (10-17 @ 13:15)      CARDIAC MARKERS:        Troponin T, Serum: 0.02 ng/mL (10-17 @ 13:15)      Blood Gas Venous - Lactate: 0.90 mmol/L (10-17 @ 16:28)            Urinalysis Basic - ( 17 Oct 2023 13:15 )    Color: x / Appearance: x / SG: x / pH: x  Gluc: 146 mg/dL / Ketone: x  / Bili: x / Urobili: x   Blood: x / Protein: x / Nitrite: x   Leuk Esterase: x / RBC: x / WBC x   Sq Epi: x / Non Sq Epi: x / Bacteria: x    CAPILLARY BLOOD GLUCOSE      ECG: Paced, R BBB  	  PREVIOUS DIAGNOSTIC TESTING:     Echocardiogram: EF 16%, mild MR, TR, GIIDD   Catheterization: 2018: 80% pLAD stenosis s/p PCI w KAYLEN      CONSTITUTIONAL: No fevers, No chills, No fatigue, No weight gain  EYES: No vision changes   ENT: No congestion, No ear pain, No sore throat.  NECK: No pain, No stiffness  RESPIRATORY: + SOB  CARDIOVASCULAR: + chest pain on inspiration  GASTROINTESTINAL: No abdominal pain, No nausea, No vomiting, No hematemesis, No diarrhea No constipation. No melena  GENITOURINARY: No dysuria, No frequency, No incontinence, No hematuria  NEUROLOGICAL: No dizziness, No lightheadedness, No syncope, No LOC, No headache, No numbness or weakness  MUSCULOSKELETAL: + Edema, No joint pain, No joint swelling.  PSYCHIATRIC: No anxiety, No depression  DERMATOLOGY: No diaphoresis. No itching, No rashes, No pressure ulcers  HEME/LYMPH: No easy bruising, or bleeding gums    All other review of systems is negative unless indicated above.    ICU Vital Signs Last 24 Hrs  T(C): 36.5 (17 Oct 2023 15:55), Max: 36.7 (17 Oct 2023 11:15)  T(F): 97.7 (17 Oct 2023 15:55), Max: 98 (17 Oct 2023 11:15)  HR: 68 (17 Oct 2023 15:55) (68 - 90)  BP: 140/76 (17 Oct 2023 15:55) (133/73 - 140/76)  BP(mean): --  ABP: --  ABP(mean): --  RR: 18 (17 Oct 2023 15:55) (18 - 21)  SpO2: 100% (17 Oct 2023 15:55) (98% - 100%)    O2 Parameters below as of 17 Oct 2023 11:15  Patient On (Oxygen Delivery Method): room air      Appearance: Tachypneic, mild distress  Cardiovascular: Regular rate and rhythm, systolic murmur, no JVD  Respiratory: Dec SB in bilateral bases R > L  Gastrointestinal:  Soft, Non-tender, + BS  Neurologic: Non-focal, A&Ox3  Skin: Warm and dry, No rashes, No ecchymosis, No cyanosis  Musculoskeletal: No clubbing, No cyanosis, No edema

## 2023-10-17 NOTE — H&P ADULT - NSHPPHYSICALEXAM_GEN_ALL_CORE
Constitutional; No acute distress  Head: Atraumatic, normocephalic  Lungs: decreased breath sounds on Rt side  Heart: S1, S2+; no murmurs  Abd: Soft non tender non distended  Ext: 2+ b/l pedal pitting edema  Neuro: AOX4; no focal deficits  Skin: No rashes or lesions on exposed skin

## 2023-10-17 NOTE — ED PROVIDER NOTE - OBJECTIVE STATEMENT
78-year-old male past medical history of CAD, CHF with EF of 16%, diabetes presents for evaluation of shortness of breath.  Patient has worsening shortness of breath x3 weeks, aggravated by lying supine, improved with sitting up.  Associated bilateral lower extremity swelling.  Denies fever, headache, chest pain, abdominal pain, weakness, numbness, dysuria, hematuria, vomiting, diarrhea.

## 2023-10-17 NOTE — H&P ADULT - ASSESSMENT
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 Patient has worsening shortness of breath x3 weeks, aggravated by lying supine, improved with sitting up.  Associated with bilateral lower extremity swelling. Denies fever, headache, chest pain, abdominal pain, weakness, numbness, dysuria, hematuria, vomiting, diarrhea    #Acute HFrEF exacerbation (2-3L home O2)  - SOB on exertion and b/l pedal edema  - Trops 0.02 (elevated at baseline), Cr 1.6 (bl 1.8), BNP 8598  - EKG showed sinus rhythm with PVCs  - s/p bumex 1mg IV in ED  - TTE 03/23: EF 16%, G2DD  - CXR appears to be volume overloaded and mod right sided pleural effusion (f/u official read)  - POCUS in ED showed large right pleural effusion, pulm edema and grossly decreased EF   - F/u official TTE  - c/w bumex 2mg iv bid (takes bumex 1mg PO bid at home)  - Fluid restriction, daily weights, strict Is & Os  - Daily CXR and EKG  - has BiV AICD  - c/w Entresto 1tab BID, metoprolol tartrate 37.5mg BID, ranolazine 500mg BID  - takes vericiguat 2.5mg daily at home (non-formulary form sent)  - cardio stepdown    #Hyperkalemia  - K+ 5.4 (hemolyzed) but 7.0 on VBG  - STAT EKG  - will give insulin dextrose and calcium gluconate now  - F/u repeat BMP 8MP    #CAD  - no longer taking aspirin    #DM  - c/w home insulin Lantus 12U at bedtime  - c/w pre-meal sliding scale    #Chronic Afib  - c/w eliquis 5mg BID  - c/w metoprolol tartrate 37.5mg BID    #Hypothyroidism  - c/w synthroid 175mcg daily  - F/u TSH    #HLD  - c/w fenofibrate 160mg daily and atorvastatin 40mg daily    #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 Patient has worsening shortness of breath x3 weeks, aggravated by lying supine, improved with sitting up.  Associated with bilateral lower extremity swelling. Denies fever, headache, chest pain, abdominal pain, weakness, numbness, dysuria, hematuria, vomiting, diarrhea    #Acute HFrEF exacerbation (2-3L home O2)  - SOB on exertion and b/l pedal edema  - Trops 0.02 (elevated at baseline), Cr 1.6 (bl 1.8), BNP 8598  - EKG showed sinus rhythm with PVCs  - s/p bumex 1mg IV in ED  - TTE 03/23: EF 16%, G2DD  - CXR appears to be volume overloaded and mod right sided pleural effusion (f/u official read)  - POCUS in ED showed large right pleural effusion, pulm edema and grossly decreased EF   - F/u official TTE  - c/w bumex 2mg iv bid (takes bumex 1mg PO bid at home)  - Fluid restriction, daily weights, strict Is & Os  - Daily CXR and EKG  - has BiV AICD  - c/w Entresto 1tab BID, metoprolol tartrate 37.5mg BID, ranolazine 500mg BID  - takes vericiguat 2.5mg daily at home (non-formulary form sent)  - cardio stepdown  - pulm consult for pleural effusion tap    #Hyperkalemia  - K+ 5.4 (hemolyzed) but 7.0 on VBG  - STAT EKG  - will give insulin dextrose and calcium gluconate now  - F/u repeat BMP 8MP    #CAD  - no longer taking aspirin    #DM  - c/w home insulin Lantus 12U at bedtime  - c/w pre-meal sliding scale    #Chronic Afib  - c/w eliquis 5mg BID  - c/w metoprolol tartrate 37.5mg BID    #Hypothyroidism  - c/w synthroid 175mcg daily  - F/u TSH    #HLD  - c/w fenofibrate 160mg daily and atorvastatin 40mg daily    #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

## 2023-10-17 NOTE — PATIENT PROFILE ADULT - FUNCTIONAL ASSESSMENT - BASIC MOBILITY 6.
3-calculated by average/Not able to assess (calculate score using [facility] averaging method)
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
## 2023-10-18 LAB
A1C WITH ESTIMATED AVERAGE GLUCOSE RESULT: 6.1 % — HIGH (ref 4–5.6)
A1C WITH ESTIMATED AVERAGE GLUCOSE RESULT: 6.1 % — HIGH (ref 4–5.6)
ALBUMIN SERPL ELPH-MCNC: 3.6 G/DL — SIGNIFICANT CHANGE UP (ref 3.5–5.2)
ALBUMIN SERPL ELPH-MCNC: 3.6 G/DL — SIGNIFICANT CHANGE UP (ref 3.5–5.2)
ALP SERPL-CCNC: 48 U/L — SIGNIFICANT CHANGE UP (ref 30–115)
ALP SERPL-CCNC: 48 U/L — SIGNIFICANT CHANGE UP (ref 30–115)
ALT FLD-CCNC: 10 U/L — SIGNIFICANT CHANGE UP (ref 0–41)
ALT FLD-CCNC: 10 U/L — SIGNIFICANT CHANGE UP (ref 0–41)
ANION GAP SERPL CALC-SCNC: 15 MMOL/L — HIGH (ref 7–14)
ANION GAP SERPL CALC-SCNC: 15 MMOL/L — HIGH (ref 7–14)
AST SERPL-CCNC: 19 U/L — SIGNIFICANT CHANGE UP (ref 0–41)
AST SERPL-CCNC: 19 U/L — SIGNIFICANT CHANGE UP (ref 0–41)
BASOPHILS # BLD AUTO: 0.04 K/UL — SIGNIFICANT CHANGE UP (ref 0–0.2)
BASOPHILS # BLD AUTO: 0.04 K/UL — SIGNIFICANT CHANGE UP (ref 0–0.2)
BASOPHILS NFR BLD AUTO: 0.5 % — SIGNIFICANT CHANGE UP (ref 0–1)
BASOPHILS NFR BLD AUTO: 0.5 % — SIGNIFICANT CHANGE UP (ref 0–1)
BILIRUB SERPL-MCNC: 0.6 MG/DL — SIGNIFICANT CHANGE UP (ref 0.2–1.2)
BILIRUB SERPL-MCNC: 0.6 MG/DL — SIGNIFICANT CHANGE UP (ref 0.2–1.2)
BUN SERPL-MCNC: 33 MG/DL — HIGH (ref 10–20)
BUN SERPL-MCNC: 33 MG/DL — HIGH (ref 10–20)
CALCIUM SERPL-MCNC: 9.3 MG/DL — SIGNIFICANT CHANGE UP (ref 8.4–10.5)
CALCIUM SERPL-MCNC: 9.3 MG/DL — SIGNIFICANT CHANGE UP (ref 8.4–10.5)
CHLORIDE SERPL-SCNC: 107 MMOL/L — SIGNIFICANT CHANGE UP (ref 98–110)
CHLORIDE SERPL-SCNC: 107 MMOL/L — SIGNIFICANT CHANGE UP (ref 98–110)
CO2 SERPL-SCNC: 21 MMOL/L — SIGNIFICANT CHANGE UP (ref 17–32)
CO2 SERPL-SCNC: 21 MMOL/L — SIGNIFICANT CHANGE UP (ref 17–32)
CREAT SERPL-MCNC: 1.4 MG/DL — SIGNIFICANT CHANGE UP (ref 0.7–1.5)
CREAT SERPL-MCNC: 1.4 MG/DL — SIGNIFICANT CHANGE UP (ref 0.7–1.5)
EGFR: 52 ML/MIN/1.73M2 — LOW
EGFR: 52 ML/MIN/1.73M2 — LOW
EOSINOPHIL # BLD AUTO: 0.41 K/UL — SIGNIFICANT CHANGE UP (ref 0–0.7)
EOSINOPHIL # BLD AUTO: 0.41 K/UL — SIGNIFICANT CHANGE UP (ref 0–0.7)
EOSINOPHIL NFR BLD AUTO: 5.6 % — SIGNIFICANT CHANGE UP (ref 0–8)
EOSINOPHIL NFR BLD AUTO: 5.6 % — SIGNIFICANT CHANGE UP (ref 0–8)
ESTIMATED AVERAGE GLUCOSE: 128 MG/DL — HIGH (ref 68–114)
ESTIMATED AVERAGE GLUCOSE: 128 MG/DL — HIGH (ref 68–114)
GLUCOSE SERPL-MCNC: 86 MG/DL — SIGNIFICANT CHANGE UP (ref 70–99)
GLUCOSE SERPL-MCNC: 86 MG/DL — SIGNIFICANT CHANGE UP (ref 70–99)
HCT VFR BLD CALC: 37.2 % — LOW (ref 42–52)
HCT VFR BLD CALC: 37.2 % — LOW (ref 42–52)
HGB BLD-MCNC: 11.6 G/DL — LOW (ref 14–18)
HGB BLD-MCNC: 11.6 G/DL — LOW (ref 14–18)
IMM GRANULOCYTES NFR BLD AUTO: 0.3 % — SIGNIFICANT CHANGE UP (ref 0.1–0.3)
IMM GRANULOCYTES NFR BLD AUTO: 0.3 % — SIGNIFICANT CHANGE UP (ref 0.1–0.3)
LYMPHOCYTES # BLD AUTO: 1.75 K/UL — SIGNIFICANT CHANGE UP (ref 1.2–3.4)
LYMPHOCYTES # BLD AUTO: 1.75 K/UL — SIGNIFICANT CHANGE UP (ref 1.2–3.4)
LYMPHOCYTES # BLD AUTO: 23.7 % — SIGNIFICANT CHANGE UP (ref 20.5–51.1)
LYMPHOCYTES # BLD AUTO: 23.7 % — SIGNIFICANT CHANGE UP (ref 20.5–51.1)
MAGNESIUM SERPL-MCNC: 1.8 MG/DL — SIGNIFICANT CHANGE UP (ref 1.8–2.4)
MAGNESIUM SERPL-MCNC: 1.8 MG/DL — SIGNIFICANT CHANGE UP (ref 1.8–2.4)
MCHC RBC-ENTMCNC: 27.2 PG — SIGNIFICANT CHANGE UP (ref 27–31)
MCHC RBC-ENTMCNC: 27.2 PG — SIGNIFICANT CHANGE UP (ref 27–31)
MCHC RBC-ENTMCNC: 31.2 G/DL — LOW (ref 32–37)
MCHC RBC-ENTMCNC: 31.2 G/DL — LOW (ref 32–37)
MCV RBC AUTO: 87.1 FL — SIGNIFICANT CHANGE UP (ref 80–94)
MCV RBC AUTO: 87.1 FL — SIGNIFICANT CHANGE UP (ref 80–94)
MONOCYTES # BLD AUTO: 0.52 K/UL — SIGNIFICANT CHANGE UP (ref 0.1–0.6)
MONOCYTES # BLD AUTO: 0.52 K/UL — SIGNIFICANT CHANGE UP (ref 0.1–0.6)
MONOCYTES NFR BLD AUTO: 7.1 % — SIGNIFICANT CHANGE UP (ref 1.7–9.3)
MONOCYTES NFR BLD AUTO: 7.1 % — SIGNIFICANT CHANGE UP (ref 1.7–9.3)
NEUTROPHILS # BLD AUTO: 4.63 K/UL — SIGNIFICANT CHANGE UP (ref 1.4–6.5)
NEUTROPHILS # BLD AUTO: 4.63 K/UL — SIGNIFICANT CHANGE UP (ref 1.4–6.5)
NEUTROPHILS NFR BLD AUTO: 62.8 % — SIGNIFICANT CHANGE UP (ref 42.2–75.2)
NEUTROPHILS NFR BLD AUTO: 62.8 % — SIGNIFICANT CHANGE UP (ref 42.2–75.2)
NRBC # BLD: 0 /100 WBCS — SIGNIFICANT CHANGE UP (ref 0–0)
NRBC # BLD: 0 /100 WBCS — SIGNIFICANT CHANGE UP (ref 0–0)
PLATELET # BLD AUTO: 195 K/UL — SIGNIFICANT CHANGE UP (ref 130–400)
PLATELET # BLD AUTO: 195 K/UL — SIGNIFICANT CHANGE UP (ref 130–400)
PMV BLD: 12.1 FL — HIGH (ref 7.4–10.4)
PMV BLD: 12.1 FL — HIGH (ref 7.4–10.4)
POTASSIUM SERPL-MCNC: 3.7 MMOL/L — SIGNIFICANT CHANGE UP (ref 3.5–5)
POTASSIUM SERPL-MCNC: 3.7 MMOL/L — SIGNIFICANT CHANGE UP (ref 3.5–5)
POTASSIUM SERPL-SCNC: 3.7 MMOL/L — SIGNIFICANT CHANGE UP (ref 3.5–5)
POTASSIUM SERPL-SCNC: 3.7 MMOL/L — SIGNIFICANT CHANGE UP (ref 3.5–5)
PROT SERPL-MCNC: 5.5 G/DL — LOW (ref 6–8)
PROT SERPL-MCNC: 5.5 G/DL — LOW (ref 6–8)
RBC # BLD: 4.27 M/UL — LOW (ref 4.7–6.1)
RBC # BLD: 4.27 M/UL — LOW (ref 4.7–6.1)
RBC # FLD: 14.1 % — SIGNIFICANT CHANGE UP (ref 11.5–14.5)
RBC # FLD: 14.1 % — SIGNIFICANT CHANGE UP (ref 11.5–14.5)
SODIUM SERPL-SCNC: 143 MMOL/L — SIGNIFICANT CHANGE UP (ref 135–146)
SODIUM SERPL-SCNC: 143 MMOL/L — SIGNIFICANT CHANGE UP (ref 135–146)
TROPONIN T SERPL-MCNC: 0.02 NG/ML — HIGH
TROPONIN T SERPL-MCNC: 0.02 NG/ML — HIGH
TSH SERPL-MCNC: 0.3 UIU/ML — SIGNIFICANT CHANGE UP (ref 0.27–4.2)
TSH SERPL-MCNC: 0.3 UIU/ML — SIGNIFICANT CHANGE UP (ref 0.27–4.2)
WBC # BLD: 7.37 K/UL — SIGNIFICANT CHANGE UP (ref 4.8–10.8)
WBC # BLD: 7.37 K/UL — SIGNIFICANT CHANGE UP (ref 4.8–10.8)
WBC # FLD AUTO: 7.37 K/UL — SIGNIFICANT CHANGE UP (ref 4.8–10.8)
WBC # FLD AUTO: 7.37 K/UL — SIGNIFICANT CHANGE UP (ref 4.8–10.8)

## 2023-10-18 PROCEDURE — 93306 TTE W/DOPPLER COMPLETE: CPT | Mod: 26

## 2023-10-18 PROCEDURE — 99233 SBSQ HOSP IP/OBS HIGH 50: CPT

## 2023-10-18 PROCEDURE — 71045 X-RAY EXAM CHEST 1 VIEW: CPT | Mod: 26

## 2023-10-18 PROCEDURE — 99223 1ST HOSP IP/OBS HIGH 75: CPT | Mod: GC

## 2023-10-18 RX ORDER — MYCOPHENOLATE MOFETIL 250 MG/1
500 CAPSULE ORAL
Refills: 0 | Status: DISCONTINUED | OUTPATIENT
Start: 2023-10-18 | End: 2023-10-24

## 2023-10-18 RX ORDER — CHLORHEXIDINE GLUCONATE 213 G/1000ML
1 SOLUTION TOPICAL DAILY
Refills: 0 | Status: DISCONTINUED | OUTPATIENT
Start: 2023-10-18 | End: 2023-10-24

## 2023-10-18 RX ORDER — METOPROLOL TARTRATE 50 MG
50 TABLET ORAL DAILY
Refills: 0 | Status: DISCONTINUED | OUTPATIENT
Start: 2023-10-18 | End: 2023-10-19

## 2023-10-18 RX ORDER — SPIRONOLACTONE 25 MG/1
25 TABLET, FILM COATED ORAL DAILY
Refills: 0 | Status: DISCONTINUED | OUTPATIENT
Start: 2023-10-18 | End: 2023-10-23

## 2023-10-18 RX ADMIN — BUMETANIDE 2 MILLIGRAM(S): 0.25 INJECTION INTRAMUSCULAR; INTRAVENOUS at 05:31

## 2023-10-18 RX ADMIN — RANOLAZINE 500 MILLIGRAM(S): 500 TABLET, FILM COATED, EXTENDED RELEASE ORAL at 05:31

## 2023-10-18 RX ADMIN — MYCOPHENOLATE MOFETIL 500 MILLIGRAM(S): 250 CAPSULE ORAL at 05:30

## 2023-10-18 RX ADMIN — MYCOPHENOLATE MOFETIL 500 MILLIGRAM(S): 250 CAPSULE ORAL at 18:33

## 2023-10-18 RX ADMIN — PANTOPRAZOLE SODIUM 40 MILLIGRAM(S): 20 TABLET, DELAYED RELEASE ORAL at 05:31

## 2023-10-18 RX ADMIN — SPIRONOLACTONE 25 MILLIGRAM(S): 25 TABLET, FILM COATED ORAL at 12:59

## 2023-10-18 RX ADMIN — BUMETANIDE 2 MILLIGRAM(S): 0.25 INJECTION INTRAMUSCULAR; INTRAVENOUS at 13:03

## 2023-10-18 RX ADMIN — APIXABAN 5 MILLIGRAM(S): 2.5 TABLET, FILM COATED ORAL at 05:31

## 2023-10-18 RX ADMIN — Medication 145 MILLIGRAM(S): at 12:59

## 2023-10-18 RX ADMIN — ATORVASTATIN CALCIUM 40 MILLIGRAM(S): 80 TABLET, FILM COATED ORAL at 21:08

## 2023-10-18 RX ADMIN — INSULIN GLARGINE 12 UNIT(S): 100 INJECTION, SOLUTION SUBCUTANEOUS at 21:08

## 2023-10-18 RX ADMIN — RANOLAZINE 500 MILLIGRAM(S): 500 TABLET, FILM COATED, EXTENDED RELEASE ORAL at 18:33

## 2023-10-18 RX ADMIN — SACUBITRIL AND VALSARTAN 1 TABLET(S): 24; 26 TABLET, FILM COATED ORAL at 05:30

## 2023-10-18 RX ADMIN — TAMSULOSIN HYDROCHLORIDE 0.4 MILLIGRAM(S): 0.4 CAPSULE ORAL at 21:08

## 2023-10-18 RX ADMIN — SACUBITRIL AND VALSARTAN 1 TABLET(S): 24; 26 TABLET, FILM COATED ORAL at 18:33

## 2023-10-18 RX ADMIN — Medication 175 MICROGRAM(S): at 05:32

## 2023-10-18 RX ADMIN — CHLORHEXIDINE GLUCONATE 1 APPLICATION(S): 213 SOLUTION TOPICAL at 13:27

## 2023-10-18 NOTE — CONSULT NOTE ADULT - ASSESSMENT
Impression    Large right pleural effusion, likely fluid overload  HFrEF (16%) s/p AICD on home o2 qhs and PRN (2-3L NC)  Former smoker  Afib on eliquis  Suspected CHRISTOPH    Plan    Cxr and US reviewed  Diuresis per cardiology  Hold eliquis for now  will reassess pleural effusion Friday morning, possible thoracentesis then  Wean o2 to goal >92%  aspiration precautions  HOB 45 degrees    Impression    Large right pleural effusion, likely fluid overload  HFrEF (16%) s/p AICD on home o2 qhs and PRN (2-3L NC)  Former smoker  Afib on eliquis  Suspected CHRISTOPH    Plan    Cxr and US reviewed  Diuresis per cardiology  Hold eliquis for now  will reassess pleural effusion Friday morning, possible thoracentesis then  Wean o2 to goal >92%  aspiration precautions  HOB 45 degrees   Will need outpatient sleep study with Dr. Srivastava

## 2023-10-18 NOTE — PROGRESS NOTE ADULT - SUBJECTIVE AND OBJECTIVE BOX
24H events:    Patient is a 78y old Male who presents with a chief complaint of   Primary diagnosis of CHF exacerbation    Today is hospital day 1d. This morning patient was seen and examined at bedside. He mentions slight improvement since admission.  No acute or major events overnight.    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 1000 milliLiter(s) IV Continuous <Continuous>  dextrose 5%. 1000 milliLiter(s) IV Continuous <Continuous>  dextrose 50% Injectable 12.5 Gram(s) IV Push once  dextrose 50% Injectable 25 Gram(s) IV Push once  dextrose 50% Injectable 25 Gram(s) IV Push once  fenofibrate Tablet 145 milliGRAM(s) Oral daily  glucagon  Injectable 1 milliGRAM(s) IntraMuscular once  insulin glargine Injectable (LANTUS) 12 Unit(s) SubCutaneous at bedtime  insulin lispro (ADMELOG) corrective regimen sliding scale   SubCutaneous three times a day before meals  levothyroxine 175 MICROGram(s) Oral daily  metoprolol succinate ER 50 milliGRAM(s) Oral daily  mycophenolate mofetil Suspension 500 milliGRAM(s) Oral two times a day  pantoprazole    Tablet 40 milliGRAM(s) Oral before breakfast  ranolazine 500 milliGRAM(s) Oral two times a day  sacubitril 49 mG/valsartan 51 mG 1 Tablet(s) Oral two times a day  spironolactone 25 milliGRAM(s) Oral daily  tamsulosin 0.4 milliGRAM(s) Oral at bedtime  Vericiguat 2.5 milliGRAM(s)   Oral daily    PRN MEDICATIONS  dextrose Oral Gel 15 Gram(s) Oral once PRN    VITALS:   T(F): 97.2  HR: 89  BP: 143/85  RR: 18  SpO2: 96%    PHYSICAL EXAM:  GENERAL:   ( x ) NAD, lying in bed comfortably     (  ) obtunded     (  ) lethargic     (  ) somnolent    HEAD:   ( x ) Atraumatic     (  ) hematoma     (  ) laceration (specify location:       )     NECK:  ( x ) Supple     (  ) neck stiffness     (  ) nuchal rigidity     (  )  no JVD     ( x ) JVD present (  cm)    HEART:  Rate -->     ( x ) normal rate     (  ) bradycardic     (  ) tachycardic  Rhythm -->     ( x ) regular     (  ) regularly irregular     (  ) irregularly irregular  Murmurs -->     ( x ) normal s1s2     (  ) systolic murmur     (  ) diastolic murmur     (  ) continuous murmur      (  ) S3 present     (  ) S4 present    LUNGS:   ( x )Unlabored respirations     (  ) tachypnea  (  ) B/L air entry     ( x ) decreased breath sounds in:  (location RLL    )    (  ) no adventitious sound     ( x ) crackles     (  ) wheezing      (  ) rhonchi      (specify location:       )  (  ) chest wall tenderness (specify location:       )    ABDOMEN:   ( x ) Soft     (  ) tense   |   ( x ) nondistended     (  ) distended   |   ( x ) +BS     (  ) hypoactive bowel sounds     (  ) hyperactive bowel sounds  ( x ) nontender     (  ) RUQ tenderness     (  ) RLQ tenderness     (  ) LLQ tenderness     (  ) epigastric tenderness     (  ) diffuse tenderness  (  ) Splenomegaly      (  ) Hepatomegaly      (  ) Jaundice     (  ) ecchymosis     EXTREMITIES:  (  ) Normal     (  ) Rash     (  ) ecchymosis     (  ) varicose veins      ( x ) pitting edema     (  ) non-pitting edema   (  ) ulceration     (  ) gangrene:     (location:     )    NERVOUS SYSTEM:    ( x ) A&Ox3     (  ) confused     (  ) lethargic  CN II-XII:     ( x ) Intact     (  ) deficits found     (Specify:     )   Upper extremities:     ( x ) no sensorimotor deficits     (  ) weakness     (  ) loss of proprioception/vibration     (  ) loss of touch/temperature (specify:    )  Lower extremities:     ( x ) no sensorimotor deficits     (  ) weakness     (  ) loss of proprioception/vibration     (  ) loss of touch/temperature (specify:    )    SKIN:   ( x ) No rashes or lesions     (  ) maculopapular rash     (  ) pustules     (  ) vesicles     (  ) ulcer     (  ) ecchymosis     (specify location:     )      (  ) Indwelling Orta Catheter:   Date insterted:    Reason (  ) Critical illness     (  ) urinary retention    (  ) Accurate Ins/Outs Monitoring     (  ) CMO patient    (  ) Central Line:   Date inserted:  Location: (  ) Right IJ     (  ) Left IJ     (  ) Right Fem     (  ) Left Fem    (  ) SPC        (  ) pigtail       (  ) PEG tube       (  ) colostomy       (  ) jejunostomy  (  ) U-Dall    LABS:                        11.6   7.37  )-----------( 195      ( 18 Oct 2023 05:45 )             37.2     10-18    143  |  107  |  33<H>  ----------------------------<  86  3.7   |  21  |  1.4    Ca    9.3      18 Oct 2023 05:45  Mg     1.8     10-18    TPro  5.5<L>  /  Alb  3.6  /  TBili  0.6  /  DBili  x   /  AST  19  /  ALT  10  /  AlkPhos  48  10-18    PT/INR - ( 17 Oct 2023 13:15 )   PT: 19.10 sec;   INR: 1.65 ratio         PTT - ( 17 Oct 2023 13:15 )  PTT:43.9 sec  Urinalysis Basic - ( 18 Oct 2023 05:45 )    Color: x / Appearance: x / SG: x / pH: x  Gluc: 86 mg/dL / Ketone: x  / Bili: x / Urobili: x   Blood: x / Protein: x / Nitrite: x   Leuk Esterase: x / RBC: x / WBC x   Sq Epi: x / Non Sq Epi: x / Bacteria: x        Troponin T, Serum: 0.02 ng/mL *H* (10-18-23 @ 05:45)      CARDIAC MARKERS ( 18 Oct 2023 05:45 )  x     / 0.02 ng/mL / x     / x     / x      CARDIAC MARKERS ( 17 Oct 2023 13:15 )  x     / 0.02 ng/mL / x     / x     / x          RADIOLOGY:

## 2023-10-18 NOTE — PROGRESS NOTE ADULT - ASSESSMENT
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 Patient has worsening shortness of breath x3 weeks, aggravated by lying supine, improved with sitting up.  Associated with bilateral lower extremity swelling. Denies fever, headache, chest pain, abdominal pain, weakness, numbness, dysuria, hematuria, vomiting, diarrhea    #Acute HFrEF exacerbation (2-3L home O2)  - Trops 0.02 (elevated at baseline), Cr 1.6 (bl 1.8), BNP 8598  - TTE 03/23: EF 16%, G2DD  - CXR: New right basilar opacity/effusion. Left basilar opacity/atelectasis.  - POCUS in ED showed large right pleural effusion, pulm edema and grossly decreased EF   - F/u TTE  - c/w bumex 2mg iv bid (takes bumex 1mg PO bid at home) --> Target net neg= 2-3L/d  - Fluid restriction 1.5L/d, daily weights, strict Is & Os  - c/w Entresto 1tab BID. 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

## 2023-10-18 NOTE — PROGRESS NOTE ADULT - ATTENDING COMMENTS
77 y/o male severe systolic HF, ICM, CAD s/p CABG who presents with ADHF. He has been failing escalation of PO diuretics at home. On exam today he is warm, well perfused, and hypervolemic. We will initiate IV diuresis and make changes to his GDMT. His ECG was also reviewed that demonstrated a QRS of 146 and he may benefit from a CRT upgrade.    Plan:  - Repeat echocardiogram  - IV bumex 2 mg BID for net negative goal 2-3L  - GDMT: continue arun 25, entresto 49/51. Change to metop XL 50 mg. Start SGLT2i tomorrow  - Consider CRT upgrade  - Continue vericiguat  - Continue fenofibrate and atorvastatin  - Continue synthyroid  - Diabetic management    Magnus Huerta MD  Interventional Heart Failure 77 y/o male severe systolic HF, ICM, CAD s/p CABG who presents with ADHF. He has been failing escalation of PO diuretics at home. On exam today he is warm, well perfused, and hypervolemic. We will initiate IV diuresis and make changes to his GDMT. His ECG was also reviewed that demonstrated a QRS of 146 and he may benefit from a CRT upgrade. He has a known chronic pleural effusion and was seen by Pulm earlier in the year and had undergone a thoracentesis. He may benefit from a repeat thora if there is no improvement after diuresis.    Plan:  - Repeat echocardiogram  - IV bumex 2 mg BID for net negative goal 2-3L  - GDMT: continue arun 25, entresto 49/51. Change to metop XL 50 mg. Start SGLT2i tomorrow  - Consider CRT upgrade  - Continue vericiguat  - Pulm following for consideration of thoracentesis. Hold anticoagulation at this time for possibility of thora on Friday  - Continue fenofibrate and atorvastatin  - Continue synthyroid  - Diabetic management    Magnus Huerta MD  Interventional Heart Failure

## 2023-10-18 NOTE — CONSULT NOTE ADULT - ATTENDING COMMENTS
Mr. Carlson was seen and examined at bedside today with his wife present.  Patient initially presented with worsening shortness of breath and lower extremity edema with orthopnea, and has been found to have acute decompensated heart failure.  Pulmonary was consulted for moderate to large right-sided pleural effusion by my read of the imaging.  Patient has had a prior thoracentesis in March 2023 which demonstrated transudative effusion.  In the setting of a similar effusion in the past, it is very unlikely that this effusion in the setting of acute decompensated heart failure is not transudative.  Recommend diuresis per cardiology to euvolemia.  Okay to hold anticoagulation for the next few days and repeat CXR on Friday morning.  If no significant improvement in pleural effusion, we will resample the pleural fluid if desired by cardiology team.  I agree with the fellow note, with the exceptions listed in my attestation above.  The remainder of impression and plan per fellow note.

## 2023-10-18 NOTE — CONSULT NOTE ADULT - SUBJECTIVE AND OBJECTIVE BOX
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 Patient has worsening shortness of breath x3 weeks, aggravated by lying supine, improved with sitting up.  Associated with bilateral lower extremity swelling. Denies fever, headache, chest pain, abdominal pain, weakness, numbness, dysuria, hematuria, vomiting, diarrhea    In ED, vitals are stable  Trops 0.02 (elevated at baseline), Cr 1.6 (bl 1.8), BNP 8598  K+ 5.4 (hemolyzed) but 7.0 on VBG  EKG showed sinus rhythm with PVCs  s/p bumex 1mg IV in ED  Initially admitted under medicine but immediately transferred to cardio stepdown (17 Oct 2023 17:13)      PAST MEDICAL & SURGICAL HISTORY:  Coronary artery disease with other form of angina pectoris      Type 2 diabetes mellitus with other neurologic complication, with long-term current use of insulin      Hypertension, unspecified type      High cholesterol      Hypothyroidism, unspecified type      HFrEF (heart failure with reduced ejection fraction)      Artificial cardiac pacemaker      S/P CABG x 6      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  No cardiovascular or pulmonary family history     REVIEW OF SYSTEMS:    All ROS are negative exept per HPI       Allergies    contrast media (iodine-based) (Other)    Intolerances          PHYSICAL EXAM  Vital Signs Last 24 Hrs  T(C): 36.2 (18 Oct 2023 08:02), Max: 36.7 (17 Oct 2023 20:14)  T(F): 97.2 (18 Oct 2023 08:02), Max: 98 (17 Oct 2023 20:14)  HR: 78 (18 Oct 2023 08:02) (68 - 92)  BP: 136/65 (18 Oct 2023 08:02) (118/86 - 150/75)  BP(mean): 94 (18 Oct 2023 08:02) (92 - 106)  RR: 18 (18 Oct 2023 08:02) (18 - 19)  SpO2: 98% (18 Oct 2023 08:02) (97% - 100%)    Parameters below as of 18 Oct 2023 04:36  Patient On (Oxygen Delivery Method): nasal cannula        CONSTITUTIONAL:  Well nourished.  NAD    CARDIAC:   Normal rate,   regular rhythm.    2+ pitting edema     RESPIRATORY:   diminished RLL BS    GASTROINTESTINAL:  Abdomen soft, non-tender,   No guarding,     MUSCULOSKELETAL:   Range of motion is not limited,  No clubbing, cyanosis    NEUROLOGICAL:   Alert and oriented   No motor deficits.    SKIN:   Skin normal color for race,   No evidence of rash.      LABS:                          11.6   7.37  )-----------( 195      ( 18 Oct 2023 05:45 )             37.2                                               10-18    143  |  107  |  33<H>  ----------------------------<  86  3.7   |  21  |  1.4    Ca    9.3      18 Oct 2023 05:45  Mg     1.8     10-18    TPro  5.5<L>  /  Alb  3.6  /  TBili  0.6  /  DBili  x   /  AST  19  /  ALT  10  /  AlkPhos  48  10-18      PT/INR - ( 17 Oct 2023 13:15 )   PT: 19.10 sec;   INR: 1.65 ratio         PTT - ( 17 Oct 2023 13:15 )  PTT:43.9 sec                                       Urinalysis Basic - ( 18 Oct 2023 05:45 )    Color: x / Appearance: x / SG: x / pH: x  Gluc: 86 mg/dL / Ketone: x  / Bili: x / Urobili: x   Blood: x / Protein: x / Nitrite: x   Leuk Esterase: x / RBC: x / WBC x   Sq Epi: x / Non Sq Epi: x / Bacteria: x        CARDIAC MARKERS ( 18 Oct 2023 05:45 )  x     / 0.02 ng/mL / x     / x     / x      CARDIAC MARKERS ( 17 Oct 2023 13:15 )  x     / 0.02 ng/mL / x     / x     / x                                                LIVER FUNCTIONS - ( 18 Oct 2023 05:45 )  Alb: 3.6 g/dL / Pro: 5.5 g/dL / ALK PHOS: 48 U/L / ALT: 10 U/L / AST: 19 U/L / GGT: x                                                                                                MEDICATIONS  (STANDING):  atorvastatin 40 milliGRAM(s) Oral at bedtime  buMETAnide Injectable 2 milliGRAM(s) IV Push two times a day  chlorhexidine 2% Cloths 1 Application(s) Topical daily  dextrose 5%. 1000 milliLiter(s) (100 mL/Hr) IV Continuous <Continuous>  dextrose 5%. 1000 milliLiter(s) (50 mL/Hr) IV Continuous <Continuous>  dextrose 50% Injectable 12.5 Gram(s) IV Push once  dextrose 50% Injectable 25 Gram(s) IV Push once  dextrose 50% Injectable 25 Gram(s) IV Push once  fenofibrate Tablet 145 milliGRAM(s) Oral daily  glucagon  Injectable 1 milliGRAM(s) IntraMuscular once  insulin glargine Injectable (LANTUS) 12 Unit(s) SubCutaneous at bedtime  insulin lispro (ADMELOG) corrective regimen sliding scale   SubCutaneous three times a day before meals  levothyroxine 175 MICROGram(s) Oral daily  metoprolol succinate ER 50 milliGRAM(s) Oral daily  mycophenolate mofetil Suspension 500 milliGRAM(s) Oral two times a day  pantoprazole    Tablet 40 milliGRAM(s) Oral before breakfast  ranolazine 500 milliGRAM(s) Oral two times a day  sacubitril 49 mG/valsartan 51 mG 1 Tablet(s) Oral two times a day  spironolactone 25 milliGRAM(s) Oral daily  tamsulosin 0.4 milliGRAM(s) Oral at bedtime  Vericiguat 2.5 milliGRAM(s)   Oral daily    MEDICATIONS  (PRN):  dextrose Oral Gel 15 Gram(s) Oral once PRN Blood Glucose LESS THAN 70 milliGRAM(s)/deciliter      X-Rays reviewed:    CXR interpreted by me:

## 2023-10-19 LAB
ALBUMIN SERPL ELPH-MCNC: 3 G/DL — LOW (ref 3.5–5.2)
ALBUMIN SERPL ELPH-MCNC: 3 G/DL — LOW (ref 3.5–5.2)
ALP SERPL-CCNC: 50 U/L — SIGNIFICANT CHANGE UP (ref 30–115)
ALP SERPL-CCNC: 50 U/L — SIGNIFICANT CHANGE UP (ref 30–115)
ALT FLD-CCNC: 9 U/L — SIGNIFICANT CHANGE UP (ref 0–41)
ALT FLD-CCNC: 9 U/L — SIGNIFICANT CHANGE UP (ref 0–41)
ANION GAP SERPL CALC-SCNC: 8 MMOL/L — SIGNIFICANT CHANGE UP (ref 7–14)
ANION GAP SERPL CALC-SCNC: 8 MMOL/L — SIGNIFICANT CHANGE UP (ref 7–14)
ANION GAP SERPL CALC-SCNC: 9 MMOL/L — SIGNIFICANT CHANGE UP (ref 7–14)
ANION GAP SERPL CALC-SCNC: 9 MMOL/L — SIGNIFICANT CHANGE UP (ref 7–14)
AST SERPL-CCNC: 18 U/L — SIGNIFICANT CHANGE UP (ref 0–41)
AST SERPL-CCNC: 18 U/L — SIGNIFICANT CHANGE UP (ref 0–41)
BASOPHILS # BLD AUTO: 0.03 K/UL — SIGNIFICANT CHANGE UP (ref 0–0.2)
BASOPHILS # BLD AUTO: 0.03 K/UL — SIGNIFICANT CHANGE UP (ref 0–0.2)
BASOPHILS NFR BLD AUTO: 0.5 % — SIGNIFICANT CHANGE UP (ref 0–1)
BASOPHILS NFR BLD AUTO: 0.5 % — SIGNIFICANT CHANGE UP (ref 0–1)
BILIRUB SERPL-MCNC: 0.5 MG/DL — SIGNIFICANT CHANGE UP (ref 0.2–1.2)
BILIRUB SERPL-MCNC: 0.5 MG/DL — SIGNIFICANT CHANGE UP (ref 0.2–1.2)
BUN SERPL-MCNC: 35 MG/DL — HIGH (ref 10–20)
CALCIUM SERPL-MCNC: 9 MG/DL — SIGNIFICANT CHANGE UP (ref 8.4–10.4)
CALCIUM SERPL-MCNC: 9 MG/DL — SIGNIFICANT CHANGE UP (ref 8.4–10.4)
CALCIUM SERPL-MCNC: 9.4 MG/DL — SIGNIFICANT CHANGE UP (ref 8.4–10.5)
CALCIUM SERPL-MCNC: 9.4 MG/DL — SIGNIFICANT CHANGE UP (ref 8.4–10.5)
CHLORIDE SERPL-SCNC: 104 MMOL/L — SIGNIFICANT CHANGE UP (ref 98–110)
CHOLEST SERPL-MCNC: 135 MG/DL — SIGNIFICANT CHANGE UP
CHOLEST SERPL-MCNC: 135 MG/DL — SIGNIFICANT CHANGE UP
CO2 SERPL-SCNC: 28 MMOL/L — SIGNIFICANT CHANGE UP (ref 17–32)
CO2 SERPL-SCNC: 28 MMOL/L — SIGNIFICANT CHANGE UP (ref 17–32)
CO2 SERPL-SCNC: 32 MMOL/L — SIGNIFICANT CHANGE UP (ref 17–32)
CO2 SERPL-SCNC: 32 MMOL/L — SIGNIFICANT CHANGE UP (ref 17–32)
CREAT SERPL-MCNC: 1.6 MG/DL — HIGH (ref 0.7–1.5)
CREAT SERPL-MCNC: 1.6 MG/DL — HIGH (ref 0.7–1.5)
CREAT SERPL-MCNC: 1.7 MG/DL — HIGH (ref 0.7–1.5)
CREAT SERPL-MCNC: 1.7 MG/DL — HIGH (ref 0.7–1.5)
EGFR: 41 ML/MIN/1.73M2 — LOW
EGFR: 41 ML/MIN/1.73M2 — LOW
EGFR: 44 ML/MIN/1.73M2 — LOW
EGFR: 44 ML/MIN/1.73M2 — LOW
EOSINOPHIL # BLD AUTO: 0.4 K/UL — SIGNIFICANT CHANGE UP (ref 0–0.7)
EOSINOPHIL # BLD AUTO: 0.4 K/UL — SIGNIFICANT CHANGE UP (ref 0–0.7)
EOSINOPHIL NFR BLD AUTO: 6.3 % — SIGNIFICANT CHANGE UP (ref 0–8)
EOSINOPHIL NFR BLD AUTO: 6.3 % — SIGNIFICANT CHANGE UP (ref 0–8)
GLUCOSE SERPL-MCNC: 124 MG/DL — HIGH (ref 70–99)
GLUCOSE SERPL-MCNC: 124 MG/DL — HIGH (ref 70–99)
GLUCOSE SERPL-MCNC: 216 MG/DL — HIGH (ref 70–99)
GLUCOSE SERPL-MCNC: 216 MG/DL — HIGH (ref 70–99)
HCT VFR BLD CALC: 35.5 % — LOW (ref 42–52)
HCT VFR BLD CALC: 35.5 % — LOW (ref 42–52)
HDLC SERPL-MCNC: 30 MG/DL — LOW
HDLC SERPL-MCNC: 30 MG/DL — LOW
HGB BLD-MCNC: 11.2 G/DL — LOW (ref 14–18)
HGB BLD-MCNC: 11.2 G/DL — LOW (ref 14–18)
IMM GRANULOCYTES NFR BLD AUTO: 0.3 % — SIGNIFICANT CHANGE UP (ref 0.1–0.3)
IMM GRANULOCYTES NFR BLD AUTO: 0.3 % — SIGNIFICANT CHANGE UP (ref 0.1–0.3)
LIPID PNL WITH DIRECT LDL SERPL: 83 MG/DL — SIGNIFICANT CHANGE UP
LIPID PNL WITH DIRECT LDL SERPL: 83 MG/DL — SIGNIFICANT CHANGE UP
LYMPHOCYTES # BLD AUTO: 1.49 K/UL — SIGNIFICANT CHANGE UP (ref 1.2–3.4)
LYMPHOCYTES # BLD AUTO: 1.49 K/UL — SIGNIFICANT CHANGE UP (ref 1.2–3.4)
LYMPHOCYTES # BLD AUTO: 23.3 % — SIGNIFICANT CHANGE UP (ref 20.5–51.1)
LYMPHOCYTES # BLD AUTO: 23.3 % — SIGNIFICANT CHANGE UP (ref 20.5–51.1)
MAGNESIUM SERPL-MCNC: 1.7 MG/DL — LOW (ref 1.8–2.4)
MAGNESIUM SERPL-MCNC: 1.7 MG/DL — LOW (ref 1.8–2.4)
MAGNESIUM SERPL-MCNC: 2.2 MG/DL — SIGNIFICANT CHANGE UP (ref 1.8–2.4)
MAGNESIUM SERPL-MCNC: 2.2 MG/DL — SIGNIFICANT CHANGE UP (ref 1.8–2.4)
MCHC RBC-ENTMCNC: 27.1 PG — SIGNIFICANT CHANGE UP (ref 27–31)
MCHC RBC-ENTMCNC: 27.1 PG — SIGNIFICANT CHANGE UP (ref 27–31)
MCHC RBC-ENTMCNC: 31.5 G/DL — LOW (ref 32–37)
MCHC RBC-ENTMCNC: 31.5 G/DL — LOW (ref 32–37)
MCV RBC AUTO: 86 FL — SIGNIFICANT CHANGE UP (ref 80–94)
MCV RBC AUTO: 86 FL — SIGNIFICANT CHANGE UP (ref 80–94)
MONOCYTES # BLD AUTO: 0.6 K/UL — SIGNIFICANT CHANGE UP (ref 0.1–0.6)
MONOCYTES # BLD AUTO: 0.6 K/UL — SIGNIFICANT CHANGE UP (ref 0.1–0.6)
MONOCYTES NFR BLD AUTO: 9.4 % — HIGH (ref 1.7–9.3)
MONOCYTES NFR BLD AUTO: 9.4 % — HIGH (ref 1.7–9.3)
NEUTROPHILS # BLD AUTO: 3.85 K/UL — SIGNIFICANT CHANGE UP (ref 1.4–6.5)
NEUTROPHILS # BLD AUTO: 3.85 K/UL — SIGNIFICANT CHANGE UP (ref 1.4–6.5)
NEUTROPHILS NFR BLD AUTO: 60.2 % — SIGNIFICANT CHANGE UP (ref 42.2–75.2)
NEUTROPHILS NFR BLD AUTO: 60.2 % — SIGNIFICANT CHANGE UP (ref 42.2–75.2)
NON HDL CHOLESTEROL: 105 MG/DL — SIGNIFICANT CHANGE UP
NON HDL CHOLESTEROL: 105 MG/DL — SIGNIFICANT CHANGE UP
NRBC # BLD: 0 /100 WBCS — SIGNIFICANT CHANGE UP (ref 0–0)
NRBC # BLD: 0 /100 WBCS — SIGNIFICANT CHANGE UP (ref 0–0)
PLATELET # BLD AUTO: 219 K/UL — SIGNIFICANT CHANGE UP (ref 130–400)
PLATELET # BLD AUTO: 219 K/UL — SIGNIFICANT CHANGE UP (ref 130–400)
PMV BLD: 11.4 FL — HIGH (ref 7.4–10.4)
PMV BLD: 11.4 FL — HIGH (ref 7.4–10.4)
POTASSIUM SERPL-MCNC: 3.5 MMOL/L — SIGNIFICANT CHANGE UP (ref 3.5–5)
POTASSIUM SERPL-MCNC: 3.5 MMOL/L — SIGNIFICANT CHANGE UP (ref 3.5–5)
POTASSIUM SERPL-MCNC: 4.1 MMOL/L — SIGNIFICANT CHANGE UP (ref 3.5–5)
POTASSIUM SERPL-MCNC: 4.1 MMOL/L — SIGNIFICANT CHANGE UP (ref 3.5–5)
POTASSIUM SERPL-SCNC: 3.5 MMOL/L — SIGNIFICANT CHANGE UP (ref 3.5–5)
POTASSIUM SERPL-SCNC: 3.5 MMOL/L — SIGNIFICANT CHANGE UP (ref 3.5–5)
POTASSIUM SERPL-SCNC: 4.1 MMOL/L — SIGNIFICANT CHANGE UP (ref 3.5–5)
POTASSIUM SERPL-SCNC: 4.1 MMOL/L — SIGNIFICANT CHANGE UP (ref 3.5–5)
PROT SERPL-MCNC: 5 G/DL — LOW (ref 6–8)
PROT SERPL-MCNC: 5 G/DL — LOW (ref 6–8)
RBC # BLD: 4.13 M/UL — LOW (ref 4.7–6.1)
RBC # BLD: 4.13 M/UL — LOW (ref 4.7–6.1)
RBC # FLD: 14.1 % — SIGNIFICANT CHANGE UP (ref 11.5–14.5)
RBC # FLD: 14.1 % — SIGNIFICANT CHANGE UP (ref 11.5–14.5)
SODIUM SERPL-SCNC: 140 MMOL/L — SIGNIFICANT CHANGE UP (ref 135–146)
SODIUM SERPL-SCNC: 140 MMOL/L — SIGNIFICANT CHANGE UP (ref 135–146)
SODIUM SERPL-SCNC: 145 MMOL/L — SIGNIFICANT CHANGE UP (ref 135–146)
SODIUM SERPL-SCNC: 145 MMOL/L — SIGNIFICANT CHANGE UP (ref 135–146)
TRIGL SERPL-MCNC: 112 MG/DL — SIGNIFICANT CHANGE UP
TRIGL SERPL-MCNC: 112 MG/DL — SIGNIFICANT CHANGE UP
WBC # BLD: 6.39 K/UL — SIGNIFICANT CHANGE UP (ref 4.8–10.8)
WBC # BLD: 6.39 K/UL — SIGNIFICANT CHANGE UP (ref 4.8–10.8)
WBC # FLD AUTO: 6.39 K/UL — SIGNIFICANT CHANGE UP (ref 4.8–10.8)
WBC # FLD AUTO: 6.39 K/UL — SIGNIFICANT CHANGE UP (ref 4.8–10.8)

## 2023-10-19 PROCEDURE — 99232 SBSQ HOSP IP/OBS MODERATE 35: CPT

## 2023-10-19 RX ORDER — METOPROLOL TARTRATE 50 MG
75 TABLET ORAL DAILY
Refills: 0 | Status: DISCONTINUED | OUTPATIENT
Start: 2023-10-20 | End: 2023-10-20

## 2023-10-19 RX ORDER — METOPROLOL TARTRATE 50 MG
25 TABLET ORAL ONCE
Refills: 0 | Status: COMPLETED | OUTPATIENT
Start: 2023-10-19 | End: 2023-10-19

## 2023-10-19 RX ORDER — POTASSIUM CHLORIDE 20 MEQ
40 PACKET (EA) ORAL ONCE
Refills: 0 | Status: COMPLETED | OUTPATIENT
Start: 2023-10-19 | End: 2023-10-19

## 2023-10-19 RX ORDER — DAPAGLIFLOZIN 10 MG/1
10 TABLET, FILM COATED ORAL EVERY 24 HOURS
Refills: 0 | Status: DISCONTINUED | OUTPATIENT
Start: 2023-10-19 | End: 2023-10-24

## 2023-10-19 RX ORDER — MAGNESIUM SULFATE 500 MG/ML
2 VIAL (ML) INJECTION ONCE
Refills: 0 | Status: COMPLETED | OUTPATIENT
Start: 2023-10-19 | End: 2023-10-19

## 2023-10-19 RX ADMIN — MYCOPHENOLATE MOFETIL 500 MILLIGRAM(S): 250 CAPSULE ORAL at 06:05

## 2023-10-19 RX ADMIN — RANOLAZINE 500 MILLIGRAM(S): 500 TABLET, FILM COATED, EXTENDED RELEASE ORAL at 06:06

## 2023-10-19 RX ADMIN — INSULIN GLARGINE 12 UNIT(S): 100 INJECTION, SOLUTION SUBCUTANEOUS at 21:02

## 2023-10-19 RX ADMIN — RANOLAZINE 500 MILLIGRAM(S): 500 TABLET, FILM COATED, EXTENDED RELEASE ORAL at 17:36

## 2023-10-19 RX ADMIN — SPIRONOLACTONE 25 MILLIGRAM(S): 25 TABLET, FILM COATED ORAL at 06:06

## 2023-10-19 RX ADMIN — BUMETANIDE 2 MILLIGRAM(S): 0.25 INJECTION INTRAMUSCULAR; INTRAVENOUS at 13:09

## 2023-10-19 RX ADMIN — Medication 175 MICROGRAM(S): at 06:06

## 2023-10-19 RX ADMIN — SACUBITRIL AND VALSARTAN 1 TABLET(S): 24; 26 TABLET, FILM COATED ORAL at 06:06

## 2023-10-19 RX ADMIN — MYCOPHENOLATE MOFETIL 500 MILLIGRAM(S): 250 CAPSULE ORAL at 17:36

## 2023-10-19 RX ADMIN — DAPAGLIFLOZIN 10 MILLIGRAM(S): 10 TABLET, FILM COATED ORAL at 12:23

## 2023-10-19 RX ADMIN — Medication 40 MILLIEQUIVALENT(S): at 08:42

## 2023-10-19 RX ADMIN — PANTOPRAZOLE SODIUM 40 MILLIGRAM(S): 20 TABLET, DELAYED RELEASE ORAL at 06:06

## 2023-10-19 RX ADMIN — TAMSULOSIN HYDROCHLORIDE 0.4 MILLIGRAM(S): 0.4 CAPSULE ORAL at 21:01

## 2023-10-19 RX ADMIN — Medication 25 MILLIGRAM(S): at 12:23

## 2023-10-19 RX ADMIN — Medication 50 MILLIGRAM(S): at 06:06

## 2023-10-19 RX ADMIN — BUMETANIDE 2 MILLIGRAM(S): 0.25 INJECTION INTRAMUSCULAR; INTRAVENOUS at 06:05

## 2023-10-19 RX ADMIN — Medication 25 GRAM(S): at 08:42

## 2023-10-19 RX ADMIN — ATORVASTATIN CALCIUM 40 MILLIGRAM(S): 80 TABLET, FILM COATED ORAL at 21:01

## 2023-10-19 RX ADMIN — Medication 145 MILLIGRAM(S): at 12:23

## 2023-10-19 RX ADMIN — SACUBITRIL AND VALSARTAN 1 TABLET(S): 24; 26 TABLET, FILM COATED ORAL at 17:36

## 2023-10-19 RX ADMIN — Medication 2: at 12:25

## 2023-10-19 NOTE — PROGRESS NOTE ADULT - ATTENDING COMMENTS
77 y/o male severe systolic HF, ICM, CAD s/p CABG who presents with ADHF. He has been failing escalation of PO diuretics at home. On exam today he is warm, well perfused, and hypervolemic but much improved. He is tolerating uptitration of GDMT. In review of his chart he already has a CRT. We will reevaluate tomorrow if needs a thoracentesis.    Plan:  - IV bumex 2 mg BID for net negative goal 2-3L  - GDMT: continue arun 25, entresto 49/51. Increase metop XL to 75 mg.   - start dapagliflozin 10 mg  - Continue vericiguat  - Pulm following for consideration of thoracentesis. Hold anticoagulation at this time for possibility of thora on Friday but is improving  - Continue fenofibrate and atorvastatin  - Continue synthyroid  - Diabetic management    Magnus Huerta MD  Interventional Heart Failure 79 y/o male severe systolic HF, ICM, CAD s/p CABG who presents with ADHF. He has been failing escalation of PO diuretics at home. On exam today he is warm, well perfused, and hypervolemic but much improved. He is tolerating uptitration of GDMT. In review of his chart he already has a CRT. Repeat echo demonstrated LVEF 27%, mildly reduced RV systolic function, and no significant valve disease. We will reevaluate tomorrow if needs a thoracentesis.    Plan:  - IV bumex 2 mg BID for net negative goal 2-3L  - GDMT: continue arun 25, entresto 49/51. Increase metop XL to 75 mg.   - start dapagliflozin 10 mg  - Continue vericiguat  - Pulm following for consideration of thoracentesis. Hold anticoagulation at this time for possibility of thora on Friday but is improving  - Continue fenofibrate and atorvastatin  - Continue synthyroid  - Diabetic management    Magnus Huerta MD  Interventional Heart Failure

## 2023-10-19 NOTE — PROGRESS NOTE ADULT - ASSESSMENT
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 Patient has worsening shortness of breath x3 weeks, aggravated by lying supine, improved with sitting up.  Associated with bilateral lower extremity swelling. Denies fever, headache, chest pain, abdominal pain, weakness, numbness, dysuria, hematuria, vomiting, diarrhea    #Acute HFrEF exacerbation (2-3L home O2)  - Trops 0.02 (elevated at baseline), Cr 1.6 (bl 1.8), BNP 8598  - TTE 03/23: EF 16%, G2DD --> new TTE: EF 27%  - CXR: New right basilar opacity/effusion. Left basilar opacity/atelectasis.  - POCUS in ED showed large right pleural effusion, pulm edema and grossly decreased EF   - c/w bumex 2mg iv bid (takes bumex 1mg PO bid at home) --> Target net neg= 2-3L/d  - Fluid restriction 1.5L/d, daily weights, strict Is & Os  - Weight 86.2 --> 84.6, net neg 380cc/24h  - c/w Entresto 1tab BID, spironolactone 25mg OD, ranolazine 500mg BID. 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

## 2023-10-20 ENCOUNTER — TRANSCRIPTION ENCOUNTER (OUTPATIENT)
Age: 78
End: 2023-10-20

## 2023-10-20 LAB
ALBUMIN SERPL ELPH-MCNC: 3.1 G/DL — LOW (ref 3.5–5.2)
ALBUMIN SERPL ELPH-MCNC: 3.1 G/DL — LOW (ref 3.5–5.2)
ALP SERPL-CCNC: 58 U/L — SIGNIFICANT CHANGE UP (ref 30–115)
ALP SERPL-CCNC: 58 U/L — SIGNIFICANT CHANGE UP (ref 30–115)
ALT FLD-CCNC: 10 U/L — SIGNIFICANT CHANGE UP (ref 0–41)
ALT FLD-CCNC: 10 U/L — SIGNIFICANT CHANGE UP (ref 0–41)
ANION GAP SERPL CALC-SCNC: 8 MMOL/L — SIGNIFICANT CHANGE UP (ref 7–14)
ANION GAP SERPL CALC-SCNC: 8 MMOL/L — SIGNIFICANT CHANGE UP (ref 7–14)
ANION GAP SERPL CALC-SCNC: 9 MMOL/L — SIGNIFICANT CHANGE UP (ref 7–14)
ANION GAP SERPL CALC-SCNC: 9 MMOL/L — SIGNIFICANT CHANGE UP (ref 7–14)
AST SERPL-CCNC: 20 U/L — SIGNIFICANT CHANGE UP (ref 0–41)
AST SERPL-CCNC: 20 U/L — SIGNIFICANT CHANGE UP (ref 0–41)
BASOPHILS # BLD AUTO: 0.03 K/UL — SIGNIFICANT CHANGE UP (ref 0–0.2)
BASOPHILS # BLD AUTO: 0.03 K/UL — SIGNIFICANT CHANGE UP (ref 0–0.2)
BASOPHILS NFR BLD AUTO: 0.4 % — SIGNIFICANT CHANGE UP (ref 0–1)
BASOPHILS NFR BLD AUTO: 0.4 % — SIGNIFICANT CHANGE UP (ref 0–1)
BILIRUB SERPL-MCNC: 0.4 MG/DL — SIGNIFICANT CHANGE UP (ref 0.2–1.2)
BILIRUB SERPL-MCNC: 0.4 MG/DL — SIGNIFICANT CHANGE UP (ref 0.2–1.2)
BUN SERPL-MCNC: 34 MG/DL — HIGH (ref 10–20)
BUN SERPL-MCNC: 34 MG/DL — HIGH (ref 10–20)
BUN SERPL-MCNC: 41 MG/DL — HIGH (ref 10–20)
BUN SERPL-MCNC: 41 MG/DL — HIGH (ref 10–20)
CALCIUM SERPL-MCNC: 9.2 MG/DL — SIGNIFICANT CHANGE UP (ref 8.4–10.4)
CALCIUM SERPL-MCNC: 9.2 MG/DL — SIGNIFICANT CHANGE UP (ref 8.4–10.4)
CALCIUM SERPL-MCNC: 9.5 MG/DL — SIGNIFICANT CHANGE UP (ref 8.4–10.5)
CALCIUM SERPL-MCNC: 9.5 MG/DL — SIGNIFICANT CHANGE UP (ref 8.4–10.5)
CHLORIDE SERPL-SCNC: 105 MMOL/L — SIGNIFICANT CHANGE UP (ref 98–110)
CHLORIDE SERPL-SCNC: 105 MMOL/L — SIGNIFICANT CHANGE UP (ref 98–110)
CHLORIDE SERPL-SCNC: 98 MMOL/L — SIGNIFICANT CHANGE UP (ref 98–110)
CHLORIDE SERPL-SCNC: 98 MMOL/L — SIGNIFICANT CHANGE UP (ref 98–110)
CO2 SERPL-SCNC: 27 MMOL/L — SIGNIFICANT CHANGE UP (ref 17–32)
CO2 SERPL-SCNC: 27 MMOL/L — SIGNIFICANT CHANGE UP (ref 17–32)
CO2 SERPL-SCNC: 32 MMOL/L — SIGNIFICANT CHANGE UP (ref 17–32)
CO2 SERPL-SCNC: 32 MMOL/L — SIGNIFICANT CHANGE UP (ref 17–32)
CREAT SERPL-MCNC: 1.7 MG/DL — HIGH (ref 0.7–1.5)
CREAT SERPL-MCNC: 1.7 MG/DL — HIGH (ref 0.7–1.5)
CREAT SERPL-MCNC: 1.9 MG/DL — HIGH (ref 0.7–1.5)
CREAT SERPL-MCNC: 1.9 MG/DL — HIGH (ref 0.7–1.5)
EGFR: 36 ML/MIN/1.73M2 — LOW
EGFR: 36 ML/MIN/1.73M2 — LOW
EGFR: 41 ML/MIN/1.73M2 — LOW
EGFR: 41 ML/MIN/1.73M2 — LOW
EOSINOPHIL # BLD AUTO: 0.42 K/UL — SIGNIFICANT CHANGE UP (ref 0–0.7)
EOSINOPHIL # BLD AUTO: 0.42 K/UL — SIGNIFICANT CHANGE UP (ref 0–0.7)
EOSINOPHIL NFR BLD AUTO: 6.2 % — SIGNIFICANT CHANGE UP (ref 0–8)
EOSINOPHIL NFR BLD AUTO: 6.2 % — SIGNIFICANT CHANGE UP (ref 0–8)
GLUCOSE SERPL-MCNC: 106 MG/DL — HIGH (ref 70–99)
GLUCOSE SERPL-MCNC: 106 MG/DL — HIGH (ref 70–99)
GLUCOSE SERPL-MCNC: 191 MG/DL — HIGH (ref 70–99)
GLUCOSE SERPL-MCNC: 191 MG/DL — HIGH (ref 70–99)
HCT VFR BLD CALC: 37 % — LOW (ref 42–52)
HCT VFR BLD CALC: 37 % — LOW (ref 42–52)
HGB BLD-MCNC: 11.8 G/DL — LOW (ref 14–18)
HGB BLD-MCNC: 11.8 G/DL — LOW (ref 14–18)
IMM GRANULOCYTES NFR BLD AUTO: 0.1 % — SIGNIFICANT CHANGE UP (ref 0.1–0.3)
IMM GRANULOCYTES NFR BLD AUTO: 0.1 % — SIGNIFICANT CHANGE UP (ref 0.1–0.3)
LYMPHOCYTES # BLD AUTO: 1.67 K/UL — SIGNIFICANT CHANGE UP (ref 1.2–3.4)
LYMPHOCYTES # BLD AUTO: 1.67 K/UL — SIGNIFICANT CHANGE UP (ref 1.2–3.4)
LYMPHOCYTES # BLD AUTO: 24.6 % — SIGNIFICANT CHANGE UP (ref 20.5–51.1)
LYMPHOCYTES # BLD AUTO: 24.6 % — SIGNIFICANT CHANGE UP (ref 20.5–51.1)
MAGNESIUM SERPL-MCNC: 2.1 MG/DL — SIGNIFICANT CHANGE UP (ref 1.8–2.4)
MAGNESIUM SERPL-MCNC: 2.1 MG/DL — SIGNIFICANT CHANGE UP (ref 1.8–2.4)
MAGNESIUM SERPL-MCNC: 2.2 MG/DL — SIGNIFICANT CHANGE UP (ref 1.8–2.4)
MAGNESIUM SERPL-MCNC: 2.2 MG/DL — SIGNIFICANT CHANGE UP (ref 1.8–2.4)
MCHC RBC-ENTMCNC: 27.1 PG — SIGNIFICANT CHANGE UP (ref 27–31)
MCHC RBC-ENTMCNC: 27.1 PG — SIGNIFICANT CHANGE UP (ref 27–31)
MCHC RBC-ENTMCNC: 31.9 G/DL — LOW (ref 32–37)
MCHC RBC-ENTMCNC: 31.9 G/DL — LOW (ref 32–37)
MCV RBC AUTO: 85.1 FL — SIGNIFICANT CHANGE UP (ref 80–94)
MCV RBC AUTO: 85.1 FL — SIGNIFICANT CHANGE UP (ref 80–94)
MONOCYTES # BLD AUTO: 0.71 K/UL — HIGH (ref 0.1–0.6)
MONOCYTES # BLD AUTO: 0.71 K/UL — HIGH (ref 0.1–0.6)
MONOCYTES NFR BLD AUTO: 10.5 % — HIGH (ref 1.7–9.3)
MONOCYTES NFR BLD AUTO: 10.5 % — HIGH (ref 1.7–9.3)
NEUTROPHILS # BLD AUTO: 3.95 K/UL — SIGNIFICANT CHANGE UP (ref 1.4–6.5)
NEUTROPHILS # BLD AUTO: 3.95 K/UL — SIGNIFICANT CHANGE UP (ref 1.4–6.5)
NEUTROPHILS NFR BLD AUTO: 58.2 % — SIGNIFICANT CHANGE UP (ref 42.2–75.2)
NEUTROPHILS NFR BLD AUTO: 58.2 % — SIGNIFICANT CHANGE UP (ref 42.2–75.2)
NRBC # BLD: 0 /100 WBCS — SIGNIFICANT CHANGE UP (ref 0–0)
NRBC # BLD: 0 /100 WBCS — SIGNIFICANT CHANGE UP (ref 0–0)
PLATELET # BLD AUTO: 226 K/UL — SIGNIFICANT CHANGE UP (ref 130–400)
PLATELET # BLD AUTO: 226 K/UL — SIGNIFICANT CHANGE UP (ref 130–400)
PMV BLD: 11.4 FL — HIGH (ref 7.4–10.4)
PMV BLD: 11.4 FL — HIGH (ref 7.4–10.4)
POTASSIUM SERPL-MCNC: 4.1 MMOL/L — SIGNIFICANT CHANGE UP (ref 3.5–5)
POTASSIUM SERPL-MCNC: 4.1 MMOL/L — SIGNIFICANT CHANGE UP (ref 3.5–5)
POTASSIUM SERPL-MCNC: 4.3 MMOL/L — SIGNIFICANT CHANGE UP (ref 3.5–5)
POTASSIUM SERPL-MCNC: 4.3 MMOL/L — SIGNIFICANT CHANGE UP (ref 3.5–5)
POTASSIUM SERPL-SCNC: 4.1 MMOL/L — SIGNIFICANT CHANGE UP (ref 3.5–5)
POTASSIUM SERPL-SCNC: 4.1 MMOL/L — SIGNIFICANT CHANGE UP (ref 3.5–5)
POTASSIUM SERPL-SCNC: 4.3 MMOL/L — SIGNIFICANT CHANGE UP (ref 3.5–5)
POTASSIUM SERPL-SCNC: 4.3 MMOL/L — SIGNIFICANT CHANGE UP (ref 3.5–5)
PROT SERPL-MCNC: 5.4 G/DL — LOW (ref 6–8)
PROT SERPL-MCNC: 5.4 G/DL — LOW (ref 6–8)
RBC # BLD: 4.35 M/UL — LOW (ref 4.7–6.1)
RBC # BLD: 4.35 M/UL — LOW (ref 4.7–6.1)
RBC # FLD: 14.1 % — SIGNIFICANT CHANGE UP (ref 11.5–14.5)
RBC # FLD: 14.1 % — SIGNIFICANT CHANGE UP (ref 11.5–14.5)
SODIUM SERPL-SCNC: 138 MMOL/L — SIGNIFICANT CHANGE UP (ref 135–146)
SODIUM SERPL-SCNC: 138 MMOL/L — SIGNIFICANT CHANGE UP (ref 135–146)
SODIUM SERPL-SCNC: 141 MMOL/L — SIGNIFICANT CHANGE UP (ref 135–146)
SODIUM SERPL-SCNC: 141 MMOL/L — SIGNIFICANT CHANGE UP (ref 135–146)
WBC # BLD: 6.79 K/UL — SIGNIFICANT CHANGE UP (ref 4.8–10.8)
WBC # BLD: 6.79 K/UL — SIGNIFICANT CHANGE UP (ref 4.8–10.8)
WBC # FLD AUTO: 6.79 K/UL — SIGNIFICANT CHANGE UP (ref 4.8–10.8)
WBC # FLD AUTO: 6.79 K/UL — SIGNIFICANT CHANGE UP (ref 4.8–10.8)

## 2023-10-20 PROCEDURE — 99232 SBSQ HOSP IP/OBS MODERATE 35: CPT

## 2023-10-20 PROCEDURE — 99232 SBSQ HOSP IP/OBS MODERATE 35: CPT | Mod: GC

## 2023-10-20 PROCEDURE — 71045 X-RAY EXAM CHEST 1 VIEW: CPT | Mod: 26

## 2023-10-20 RX ORDER — APIXABAN 2.5 MG/1
5 TABLET, FILM COATED ORAL EVERY 12 HOURS
Refills: 0 | Status: DISCONTINUED | OUTPATIENT
Start: 2023-10-20 | End: 2023-10-24

## 2023-10-20 RX ORDER — METOPROLOL TARTRATE 50 MG
100 TABLET ORAL DAILY
Refills: 0 | Status: DISCONTINUED | OUTPATIENT
Start: 2023-10-21 | End: 2023-10-24

## 2023-10-20 RX ADMIN — RANOLAZINE 500 MILLIGRAM(S): 500 TABLET, FILM COATED, EXTENDED RELEASE ORAL at 05:03

## 2023-10-20 RX ADMIN — INSULIN GLARGINE 12 UNIT(S): 100 INJECTION, SOLUTION SUBCUTANEOUS at 21:21

## 2023-10-20 RX ADMIN — Medication 75 MILLIGRAM(S): at 05:03

## 2023-10-20 RX ADMIN — SPIRONOLACTONE 25 MILLIGRAM(S): 25 TABLET, FILM COATED ORAL at 05:03

## 2023-10-20 RX ADMIN — BUMETANIDE 2 MILLIGRAM(S): 0.25 INJECTION INTRAMUSCULAR; INTRAVENOUS at 05:02

## 2023-10-20 RX ADMIN — MYCOPHENOLATE MOFETIL 500 MILLIGRAM(S): 250 CAPSULE ORAL at 05:02

## 2023-10-20 RX ADMIN — ATORVASTATIN CALCIUM 40 MILLIGRAM(S): 80 TABLET, FILM COATED ORAL at 21:21

## 2023-10-20 RX ADMIN — APIXABAN 5 MILLIGRAM(S): 2.5 TABLET, FILM COATED ORAL at 11:37

## 2023-10-20 RX ADMIN — TAMSULOSIN HYDROCHLORIDE 0.4 MILLIGRAM(S): 0.4 CAPSULE ORAL at 21:21

## 2023-10-20 RX ADMIN — APIXABAN 5 MILLIGRAM(S): 2.5 TABLET, FILM COATED ORAL at 21:21

## 2023-10-20 RX ADMIN — DAPAGLIFLOZIN 10 MILLIGRAM(S): 10 TABLET, FILM COATED ORAL at 11:59

## 2023-10-20 RX ADMIN — PANTOPRAZOLE SODIUM 40 MILLIGRAM(S): 20 TABLET, DELAYED RELEASE ORAL at 05:03

## 2023-10-20 RX ADMIN — MYCOPHENOLATE MOFETIL 500 MILLIGRAM(S): 250 CAPSULE ORAL at 18:31

## 2023-10-20 RX ADMIN — RANOLAZINE 500 MILLIGRAM(S): 500 TABLET, FILM COATED, EXTENDED RELEASE ORAL at 18:31

## 2023-10-20 RX ADMIN — BUMETANIDE 2 MILLIGRAM(S): 0.25 INJECTION INTRAMUSCULAR; INTRAVENOUS at 14:55

## 2023-10-20 RX ADMIN — Medication 145 MILLIGRAM(S): at 12:00

## 2023-10-20 RX ADMIN — Medication 175 MICROGRAM(S): at 05:03

## 2023-10-20 RX ADMIN — SACUBITRIL AND VALSARTAN 1 TABLET(S): 24; 26 TABLET, FILM COATED ORAL at 18:31

## 2023-10-20 RX ADMIN — SACUBITRIL AND VALSARTAN 1 TABLET(S): 24; 26 TABLET, FILM COATED ORAL at 05:03

## 2023-10-20 NOTE — PROGRESS NOTE ADULT - ATTENDING COMMENTS
Mr. Carlson was seen and examined again at bedside today, the patient has had significant improvement in his pleural effusions with the last several days of diuresis, and is now off of all oxygen.  Recommend continuing home oxygen for sleep, and following up with Dr. Rock Hannah and obtaining his outpatient sleep study.  Otherwise, there is no role for diagnostic/therapeutic thoracentesis at this time.  Pulmonary team will sign off.  Please call back with questions.  I agree with the fellow note, with the exceptions listed in my attestation above.  The remainder of impression and plan per fellow note.

## 2023-10-20 NOTE — DISCHARGE NOTE NURSING/CASE MANAGEMENT/SOCIAL WORK - PATIENT PORTAL LINK FT
You can access the FollowMyHealth Patient Portal offered by [facility] by registering at the following website: [email or web address]. By joining [facility]’s FollowMyHealth portal, you will also be able to view your health information using other applications (apps) compatible with our system.

## 2023-10-20 NOTE — PROGRESS NOTE ADULT - SUBJECTIVE AND OBJECTIVE BOX
Patient is a 78y old  Male who presents with a chief complaint of       SUBJECTIVE: pt is off o2, feeling better       REVIEW OF SYSTEMS:  See HPI    PHYSICAL EXAM  Vital Signs Last 24 Hrs  T(C): 36.9 (20 Oct 2023 07:52), Max: 36.9 (20 Oct 2023 07:52)  T(F): 98.5 (20 Oct 2023 07:52), Max: 98.5 (20 Oct 2023 07:52)  HR: 73 (20 Oct 2023 11:39) (73 - 86)  BP: 131/68 (20 Oct 2023 11:39) (102/59 - 144/70)  BP(mean): 94 (20 Oct 2023 11:39) (75 - 101)  RR: 19 (20 Oct 2023 11:39) (18 - 20)  SpO2: 95% (20 Oct 2023 11:39) (88% - 96%)    Parameters below as of 20 Oct 2023 11:39  Patient On (Oxygen Delivery Method): nasal cannula  O2 Flow (L/min): 3      General: In NAD  Lymphatic system: No Cervical LN    Respiratory: diminished on the right   Cardiovascular: Regular  Gastrointestinal: Soft.   Musculoskeletal: BL LE pitting edema     Skin: Warm.  Intact  Neurological: No motor or sensory deficit      10-19-23 @ 07:01  -  10-20-23 @ 07:00  --------------------------------------------------------  IN:    IV PiggyBack: 50 mL    Oral Fluid: 814 mL  Total IN: 864 mL    OUT:    Voided (mL): 1790 mL  Total OUT: 1790 mL    Total NET: -926 mL      10-20-23 @ 07:01  -  10-20-23 @ 13:21  --------------------------------------------------------  IN:    Oral Fluid: 327 mL  Total IN: 327 mL    OUT:  Total OUT: 0 mL    Total NET: 327 mL          LABS:                          11.8   6.79  )-----------( 226      ( 20 Oct 2023 04:49 )             37.0                                               10-20    141  |  105  |  34<H>  ----------------------------<  106<H>  4.3   |  27  |  1.7<H>    Ca    9.2      20 Oct 2023 04:49  Mg     2.1     10-20    TPro  5.4<L>  /  Alb  3.1<L>  /  TBili  0.4  /  DBili  x   /  AST  20  /  ALT  10  /  AlkPhos  58  10-20                                             Urinalysis Basic - ( 20 Oct 2023 04:49 )    Color: x / Appearance: x / SG: x / pH: x  Gluc: 106 mg/dL / Ketone: x  / Bili: x / Urobili: x   Blood: x / Protein: x / Nitrite: x   Leuk Esterase: x / RBC: x / WBC x   Sq Epi: x / Non Sq Epi: x / Bacteria: x                                                  LIVER FUNCTIONS - ( 20 Oct 2023 04:49 )  Alb: 3.1 g/dL / Pro: 5.4 g/dL / ALK PHOS: 58 U/L / ALT: 10 U/L / AST: 20 U/L / GGT: x                                                                                                MEDICATIONS  (STANDING):  apixaban 5 milliGRAM(s) Oral every 12 hours  atorvastatin 40 milliGRAM(s) Oral at bedtime  buMETAnide Injectable 2 milliGRAM(s) IV Push two times a day  chlorhexidine 2% Cloths 1 Application(s) Topical daily  dapagliflozin 10 milliGRAM(s) Oral every 24 hours  fenofibrate Tablet 145 milliGRAM(s) Oral daily  glucagon  Injectable 1 milliGRAM(s) IntraMuscular once  insulin glargine Injectable (LANTUS) 12 Unit(s) SubCutaneous at bedtime  insulin lispro (ADMELOG) corrective regimen sliding scale   SubCutaneous three times a day before meals  levothyroxine 175 MICROGram(s) Oral daily  mycophenolate mofetil Suspension 500 milliGRAM(s) Oral two times a day  pantoprazole    Tablet 40 milliGRAM(s) Oral before breakfast  ranolazine 500 milliGRAM(s) Oral two times a day  sacubitril 49 mG/valsartan 51 mG 1 Tablet(s) Oral two times a day  spironolactone 25 milliGRAM(s) Oral daily  tamsulosin 0.4 milliGRAM(s) Oral at bedtime  Vericiguat 2.5 milliGRAM(s) 2.5 milliGRAM(s) Oral daily    MEDICATIONS  (PRN):

## 2023-10-20 NOTE — PROGRESS NOTE ADULT - SUBJECTIVE AND OBJECTIVE BOX
24H events:    Patient is a 78y old Male who presents with a chief complaint of   Primary diagnosis of CHF exacerbation      Day 1:  Day 2:  Day 3:     Today is hospital day 3d. This morning patient was seen and examined at bedside, resting comfortably in bed.    No acute or major events overnight.    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      SOCIAL HISTORY:  Social History:      ALLERGIES:  contrast media (iodine-based) (Other)    MEDICATIONS:  STANDING MEDICATIONS  apixaban 5 milliGRAM(s) Oral every 12 hours  atorvastatin 40 milliGRAM(s) Oral at bedtime  buMETAnide Injectable 2 milliGRAM(s) IV Push two times a day  chlorhexidine 2% Cloths 1 Application(s) Topical daily  dapagliflozin 10 milliGRAM(s) Oral every 24 hours  fenofibrate Tablet 145 milliGRAM(s) Oral daily  glucagon  Injectable 1 milliGRAM(s) IntraMuscular once  insulin glargine Injectable (LANTUS) 12 Unit(s) SubCutaneous at bedtime  insulin lispro (ADMELOG) corrective regimen sliding scale   SubCutaneous three times a day before meals  levothyroxine 175 MICROGram(s) Oral daily  mycophenolate mofetil Suspension 500 milliGRAM(s) Oral two times a day  pantoprazole    Tablet 40 milliGRAM(s) Oral before breakfast  ranolazine 500 milliGRAM(s) Oral two times a day  sacubitril 49 mG/valsartan 51 mG 1 Tablet(s) Oral two times a day  spironolactone 25 milliGRAM(s) Oral daily  tamsulosin 0.4 milliGRAM(s) Oral at bedtime  Vericiguat 2.5 milliGRAM(s) 2.5 milliGRAM(s) Oral daily    PRN MEDICATIONS    VITALS:   T(F): 98.5  HR: 73  BP: 131/68  RR: 19  SpO2: 95%    PHYSICAL EXAM:  GENERAL:   (  ) NAD, lying in bed comfortably     (  ) obtunded     (  ) lethargic     (  ) somnolent    HEAD:   (  ) Atraumatic     (  ) hematoma     (  ) laceration (specify location:       )     NECK:  (  ) Supple     (  ) neck stiffness     (  ) nuchal rigidity     (  )  no JVD     (  ) JVD present ( -- cm)    HEART:  Rate -->     (  ) normal rate     (  ) bradycardic     (  ) tachycardic  Rhythm -->     (  ) regular     (  ) regularly irregular     (  ) irregularly irregular  Murmurs -->     (  ) normal s1s2     (  ) systolic murmur     (  ) diastolic murmur     (  ) continuous murmur      (  ) S3 present     (  ) S4 present    LUNGS:   (  )Unlabored respirations     (  ) tachypnea  (  ) B/L air entry     (  ) decreased breath sounds in:  (location     )    (  ) no adventitious sound     (  ) crackles     (  ) wheezing      (  ) rhonchi      (specify location:       )  (  ) chest wall tenderness (specify location:       )    ABDOMEN:   (  ) Soft     (  ) tense   |   (  ) nondistended     (  ) distended   |   (  ) +BS     (  ) hypoactive bowel sounds     (  ) hyperactive bowel sounds  (  ) nontender     (  ) RUQ tenderness     (  ) RLQ tenderness     (  ) LLQ tenderness     (  ) epigastric tenderness     (  ) diffuse tenderness  (  ) Splenomegaly      (  ) Hepatomegaly      (  ) Jaundice     (  ) ecchymosis     EXTREMITIES:  (  ) Normal     (  ) Rash     (  ) ecchymosis     (  ) varicose veins      (  ) pitting edema     (  ) non-pitting edema   (  ) ulceration     (  ) gangrene:     (location:     )    NERVOUS SYSTEM:    (  ) A&Ox3     (  ) confused     (  ) lethargic  CN II-XII:     (  ) Intact     (  ) deficits found     (Specify:     )   Upper extremities:     (  ) no sensorimotor deficits     (  ) weakness     (  ) loss of proprioception/vibration     (  ) loss of touch/temperature (specify:    )  Lower extremities:     (  ) no sensorimotor deficits     (  ) weakness     (  ) loss of proprioception/vibration     (  ) loss of touch/temperature (specify:    )    SKIN:   (  ) No rashes or lesions     (  ) maculopapular rash     (  ) pustules     (  ) vesicles     (  ) ulcer     (  ) ecchymosis     (specify location:     )    AMPAC score:    (  ) Indwelling Orta Catheter:   Date insterted:    Reason (  ) Critical illness     (  ) urinary retention    (  ) Accurate Ins/Outs Monitoring     (  ) CMO patient    (  ) Central Line:   Date inserted:  Location: (  ) Right IJ     (  ) Left IJ     (  ) Right Fem     (  ) Left Fem    (  ) SPC        (  ) pigtail       (  ) PEG tube       (  ) colostomy       (  ) jejunostomy  (  ) U-Dall    LABS:                        11.8   6.79  )-----------( 226      ( 20 Oct 2023 04:49 )             37.0     10-20    141  |  105  |  34<H>  ----------------------------<  106<H>  4.3   |  27  |  1.7<H>    Ca    9.2      20 Oct 2023 04:49  Mg     2.1     10-20    TPro  5.4<L>  /  Alb  3.1<L>  /  TBili  0.4  /  DBili  x   /  AST  20  /  ALT  10  /  AlkPhos  58  10-20      Urinalysis Basic - ( 20 Oct 2023 04:49 )    Color: x / Appearance: x / SG: x / pH: x  Gluc: 106 mg/dL / Ketone: x  / Bili: x / Urobili: x   Blood: x / Protein: x / Nitrite: x   Leuk Esterase: x / RBC: x / WBC x   Sq Epi: x / Non Sq Epi: x / Bacteria: x                RADIOLOGY:           24H events:    Patient is a 78y old Male who presents with a chief complaint of   Primary diagnosis of CHF exacerbation    Today is hospital day 3d. This morning patient was seen and examined at bedside, resting comfortably in bed.    No acute or major events overnight.    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      SOCIAL HISTORY:  Social History:      ALLERGIES:  contrast media (iodine-based) (Other)    MEDICATIONS:  STANDING MEDICATIONS  apixaban 5 milliGRAM(s) Oral every 12 hours  atorvastatin 40 milliGRAM(s) Oral at bedtime  buMETAnide Injectable 2 milliGRAM(s) IV Push two times a day  chlorhexidine 2% Cloths 1 Application(s) Topical daily  dapagliflozin 10 milliGRAM(s) Oral every 24 hours  fenofibrate Tablet 145 milliGRAM(s) Oral daily  glucagon  Injectable 1 milliGRAM(s) IntraMuscular once  insulin glargine Injectable (LANTUS) 12 Unit(s) SubCutaneous at bedtime  insulin lispro (ADMELOG) corrective regimen sliding scale   SubCutaneous three times a day before meals  levothyroxine 175 MICROGram(s) Oral daily  mycophenolate mofetil Suspension 500 milliGRAM(s) Oral two times a day  pantoprazole    Tablet 40 milliGRAM(s) Oral before breakfast  ranolazine 500 milliGRAM(s) Oral two times a day  sacubitril 49 mG/valsartan 51 mG 1 Tablet(s) Oral two times a day  spironolactone 25 milliGRAM(s) Oral daily  tamsulosin 0.4 milliGRAM(s) Oral at bedtime  Vericiguat 2.5 milliGRAM(s) 2.5 milliGRAM(s) Oral daily    PRN MEDICATIONS    VITALS:   T(F): 98.5  HR: 73  BP: 131/68  RR: 19  SpO2: 95%    PHYSICAL EXAM:  GENERAL:   ( x ) NAD, lying in bed comfortably     (  ) obtunded     (  ) lethargic     (  ) somnolent    HEAD:   ( x ) Atraumatic     (  ) hematoma     (  ) laceration (specify location:       )     NECK:  ( x ) Supple     (  ) neck stiffness     (  ) nuchal rigidity     (  )  no JVD     ( x ) JVD present (  cm)    HEART:  Rate -->     ( x ) normal rate     (  ) bradycardic     (  ) tachycardic  Rhythm -->     ( x ) regular     (  ) regularly irregular     (  ) irregularly irregular  Murmurs -->     ( x ) normal s1s2     (  ) systolic murmur     (  ) diastolic murmur     (  ) continuous murmur      (  ) S3 present     (  ) S4 present    LUNGS:   ( x )Unlabored respirations     (  ) tachypnea  (  ) B/L air entry     ( x ) decreased breath sounds in:  (location RLL    )    (  ) no adventitious sound     ( x ) crackles     (  ) wheezing      (  ) rhonchi      (specify location:       )  (  ) chest wall tenderness (specify location:       )    ABDOMEN:   ( x ) Soft     (  ) tense   |   ( x ) nondistended     (  ) distended   |   ( x ) +BS     (  ) hypoactive bowel sounds     (  ) hyperactive bowel sounds  ( x ) nontender     (  ) RUQ tenderness     (  ) RLQ tenderness     (  ) LLQ tenderness     (  ) epigastric tenderness     (  ) diffuse tenderness  (  ) Splenomegaly      (  ) Hepatomegaly      (  ) Jaundice     (  ) ecchymosis     EXTREMITIES:  (  ) Normal     (  ) Rash     (  ) ecchymosis     (  ) varicose veins      ( x ) pitting edema     (  ) non-pitting edema   (  ) ulceration     (  ) gangrene:     (location:     )    NERVOUS SYSTEM:    ( x ) A&Ox3     (  ) confused     (  ) lethargic  CN II-XII:     ( x ) Intact     (  ) deficits found     (Specify:     )   Upper extremities:     ( x ) no sensorimotor deficits     (  ) weakness     (  ) loss of proprioception/vibration     (  ) loss of touch/temperature (specify:    )  Lower extremities:     ( x ) no sensorimotor deficits     (  ) weakness     (  ) loss of proprioception/vibration     (  ) loss of touch/temperature (specify:    )    SKIN:   ( x ) No rashes or lesions     (  ) maculopapular rash     (  ) pustules     (  ) vesicles     (  ) ulcer     (  ) ecchymosis     (specify location:     )      (  ) Indwelling Orta Catheter:   Date insterted:    Reason (  ) Critical illness     (  ) urinary retention    (  ) Accurate Ins/Outs Monitoring     (  ) CMO patient    (  ) Central Line:   Date inserted:  Location: (  ) Right IJ     (  ) Left IJ     (  ) Right Fem     (  ) Left Fem    (  ) SPC        (  ) pigtail       (  ) PEG tube       (  ) colostomy       (  ) jejunostomy  (  ) U-Dall    LABS:                        11.8   6.79  )-----------( 226      ( 20 Oct 2023 04:49 )             37.0     10-20    141  |  105  |  34<H>  ----------------------------<  106<H>  4.3   |  27  |  1.7<H>    Ca    9.2      20 Oct 2023 04:49  Mg     2.1     10-20    TPro  5.4<L>  /  Alb  3.1<L>  /  TBili  0.4  /  DBili  x   /  AST  20  /  ALT  10  /  AlkPhos  58  10-20      Urinalysis Basic - ( 20 Oct 2023 04:49 )    Color: x / Appearance: x / SG: x / pH: x  Gluc: 106 mg/dL / Ketone: x  / Bili: x / Urobili: x   Blood: x / Protein: x / Nitrite: x   Leuk Esterase: x / RBC: x / WBC x   Sq Epi: x / Non Sq Epi: x / Bacteria: x                RADIOLOGY:

## 2023-10-20 NOTE — PROGRESS NOTE ADULT - ATTENDING COMMENTS
79 y/o male severe systolic HF, ICM, CAD s/p CABG who presents with ADHF. He has been failing escalation of PO diuretics at home. On exam today he is warm, well perfused, and hypervolemic but much improved. He is tolerating uptitration of GDMT. In review of his chart he already has a CRT. Repeat echo demonstrated LVEF 27%, mildly reduced RV systolic function, and no significant valve disease. Given improvement will not need to pursue a thoracentesis.     Plan:  - IV bumex 2 mg BID for net negative goal 2L. Transition to PO tomorrow  - GDMT: continue arun 25, entresto 49/51, dapa 10 mg. Increase metop XL to 100 mg.   - Continue vericiguat  - Resume anticoagulation as he is much improved and does not need a thoracentesis  - Continue fenofibrate and atorvastatin  - Continue synthyroid  - Diabetic management    Magnus Huerta MD  Interventional Heart Failure

## 2023-10-20 NOTE — PROGRESS NOTE ADULT - ASSESSMENT
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 Patient has worsening shortness of breath x3 weeks, aggravated by lying supine, improved with sitting up.  Associated with bilateral lower extremity swelling. Denies fever, headache, chest pain, abdominal pain, weakness, numbness, dysuria, hematuria, vomiting, diarrhea    #Acute HFrEF exacerbation (2-3L home O2)  - Trops 0.02 (elevated at baseline), Cr 1.6 (bl 1.8), BNP 8598  - TTE 03/23: EF 16%, G2DD --> new TTE: EF 27%  - CXR: New right basilar opacity/effusion. Left basilar opacity/atelectasis.  - POCUS in ED showed large right pleural effusion, pulm edema and grossly decreased EF   - c/w bumex 2mg iv bid (takes bumex 1mg PO bid at home) --> Target net neg= 2-3L/d  - Fluid restriction 1.5L/d, daily weights, strict Is & Os  - Weight 86.2 --> 84.6, net neg 380cc/24h  - c/w Entresto 1tab BID, spironolactone 25mg OD, ranolazine 500mg BID. 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 Patient has worsening shortness of breath x3 weeks, aggravated by lying supine, improved with sitting up.  Associated with bilateral lower extremity swelling. Denies fever, headache, chest pain, abdominal pain, weakness, numbness, dysuria, hematuria, vomiting, diarrhea    #Acute HFrEF exacerbation (2-3L home O2)  - Trops 0.02 (elevated at baseline), Cr 1.6 (bl 1.8), BNP 8598  - TTE 03/23: EF 16%, G2DD --> new TTE: EF 27%  - CXR: New right basilar opacity/effusion. Left basilar opacity/atelectasis.  - POCUS in ED showed large right pleural effusion, pulm edema and grossly decreased EF   - c/w bumex 2mg iv bid (takes bumex 1mg PO bid at home)   - Fluid restriction 1.5L/d, daily weights, strict Is & Os  - Weight 86.2 --> 84.6 --> 83.4, net neg 926cc/24h  - c/w Entresto 1tab BID, spironolactone 25mg OD, dapagliflozin 10mg od, ranolazine 500mg BID. 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

## 2023-10-20 NOTE — PHYSICAL THERAPY INITIAL EVALUATION ADULT - GENERAL OBSERVATIONS, REHAB EVAL
8:45-9:15. chart reviewed. Pt received sitting at EOB, alert, oriented, able to follow multi-step instructions and agreeable to PT evaluation. + tele, -ve orthostatic, on RA, SPO2 t/o session 95-97%, SOBWE, requires rest breaks, denies pain or dizziness CC L knee discomfort 2/2 OA, + R drop foot doesn't have AFO. NAD.

## 2023-10-20 NOTE — PHYSICAL THERAPY INITIAL EVALUATION ADULT - ADDITIONAL COMMENTS
Pt resides with wife in a private house using 10 outdoor steps, and 13 steps to access bedroom/shower. Pt used to be independent with overall functional mobility able to ambulate using cane at baseline

## 2023-10-20 NOTE — PHYSICAL THERAPY INITIAL EVALUATION ADULT - GAIT TRAINING, PT EVAL
Pt will ambulate using RW or least restrictive AD for 300 ft with S/U by discharge to facilitate return to PLOF. Pt will negotiate 13 steps using 1 HR under supervision.

## 2023-10-20 NOTE — PROGRESS NOTE ADULT - ASSESSMENT
Impression    Large right pleural effusion, likely fluid overload  HFrEF (16%) s/p AICD on home o2 qhs and PRN (2-3L NC)  Former smoker  Afib on eliquis  Suspected CHRISTOPH    Plan    Cxr and US reviewed  Diuresis per cardiology  Can restart eliquis   Wean o2 to goal >92%  aspiration precautions  HOB 45 degrees  Will need outpatient sleep study with Dr. Srivastava  Pt is off o2, doing much better, no need for thoracentesis at this time

## 2023-10-20 NOTE — PHYSICAL THERAPY INITIAL EVALUATION ADULT - PERTINENT HX OF CURRENT PROBLEM, REHAB EVAL
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 Patient has worsening shortness of breath x3 weeks, aggravated by lying supine, improved with sitting up.  Associated with bilateral lower extremity swelling. Denies fever, headache, chest pain, abdominal pain, weakness, numbness, dysuria, hematuria, vomiting, diarrhea

## 2023-10-20 NOTE — DISCHARGE NOTE NURSING/CASE MANAGEMENT/SOCIAL WORK - NSCORESITESY/N_GEN_A_CORE_RD
Physical Therapy    Referred by: Eulalia Easton MD; Medical Diagnosis (from order):    Diagnosis Information      Diagnosis    754.0 (ICD-9-CM) - Q67.3 (ICD-10-CM) - Plagiocephaly    723.5 (ICD-9-CM) - M43.6 (ICD-10-CM) - Torticollis                Visit Type: Daily Treatment Note  Visit:  6     SUBJECTIVE                                                                                                             Present and reporting subjective information: mother  He still does not like to roll to his left. Mom turns his head all the way to the right when he is going to sleep.     OBJECTIVE                                                                                                                            TREATMENT                                                                                                                  Therapeutic Exercise:  Passive neck lateral flexor stretch, to the left, supine and semi reclined  Passive neck rotation stretch, to the right, supine and in supported sitting  Cervical AROM in supine, prone, and supported sitting position using visual/auditory stimuli  Side play on right to promote right rotation, counter pressure through right side of the head, and use of both upper extremities   Side play on left to promote active head lift and rolling to the left   Head righting strengthening in supported sitting on therapist's lap    Neuromuscular Re-Education:  Facilitation of prone on plinth with active head lift  Facilitated rolling supine to/from prone  Facilitation of midline play     Skilled input: as detailed above    Home Exercise Program: 1. 60 minutes of tummy time per day  2. Active cervical range of motion promoting rotation to the right throughout the day  3. Passive right rotation stretch hold at least 30 seconds for a total of 5 minutes per day - in sitting or supine  4. right sidelying play   3. Facilitate rolling bilaterally - focusing on supine to prone to the  left        ASSESSMENT                                                                                                               Patient is grabbing his feet more. He struggles with rolling to his left and required moderate facilitation to complete. He displays good head lift in prone.   Child/Caregiver Education:   Results of above outlined education: Verbalizes understanding, Needs reinforcement and Demonstrates understanding      PLAN                                                                                                                           Suggestions for next session as indicated: Progress per plan of care, progress rolling and cervical strength, schedule more appointments going every other week            Therapy procedure time and total treatment time can be found documented on the Time Entry flowsheet   No

## 2023-10-21 LAB
ALBUMIN SERPL ELPH-MCNC: 3.1 G/DL — LOW (ref 3.5–5.2)
ALBUMIN SERPL ELPH-MCNC: 3.1 G/DL — LOW (ref 3.5–5.2)
ALP SERPL-CCNC: 55 U/L — SIGNIFICANT CHANGE UP (ref 30–115)
ALP SERPL-CCNC: 55 U/L — SIGNIFICANT CHANGE UP (ref 30–115)
ALT FLD-CCNC: 10 U/L — SIGNIFICANT CHANGE UP (ref 0–41)
ALT FLD-CCNC: 10 U/L — SIGNIFICANT CHANGE UP (ref 0–41)
ANION GAP SERPL CALC-SCNC: 10 MMOL/L — SIGNIFICANT CHANGE UP (ref 7–14)
AST SERPL-CCNC: 18 U/L — SIGNIFICANT CHANGE UP (ref 0–41)
AST SERPL-CCNC: 18 U/L — SIGNIFICANT CHANGE UP (ref 0–41)
BASOPHILS # BLD AUTO: 0.03 K/UL — SIGNIFICANT CHANGE UP (ref 0–0.2)
BASOPHILS # BLD AUTO: 0.03 K/UL — SIGNIFICANT CHANGE UP (ref 0–0.2)
BASOPHILS NFR BLD AUTO: 0.5 % — SIGNIFICANT CHANGE UP (ref 0–1)
BASOPHILS NFR BLD AUTO: 0.5 % — SIGNIFICANT CHANGE UP (ref 0–1)
BILIRUB SERPL-MCNC: 0.4 MG/DL — SIGNIFICANT CHANGE UP (ref 0.2–1.2)
BILIRUB SERPL-MCNC: 0.4 MG/DL — SIGNIFICANT CHANGE UP (ref 0.2–1.2)
BUN SERPL-MCNC: 42 MG/DL — HIGH (ref 10–20)
BUN SERPL-MCNC: 42 MG/DL — HIGH (ref 10–20)
BUN SERPL-MCNC: 44 MG/DL — HIGH (ref 10–20)
BUN SERPL-MCNC: 44 MG/DL — HIGH (ref 10–20)
CALCIUM SERPL-MCNC: 8.9 MG/DL — SIGNIFICANT CHANGE UP (ref 8.4–10.4)
CALCIUM SERPL-MCNC: 8.9 MG/DL — SIGNIFICANT CHANGE UP (ref 8.4–10.4)
CALCIUM SERPL-MCNC: 9.3 MG/DL — SIGNIFICANT CHANGE UP (ref 8.4–10.5)
CALCIUM SERPL-MCNC: 9.3 MG/DL — SIGNIFICANT CHANGE UP (ref 8.4–10.5)
CHLORIDE SERPL-SCNC: 101 MMOL/L — SIGNIFICANT CHANGE UP (ref 98–110)
CHLORIDE SERPL-SCNC: 101 MMOL/L — SIGNIFICANT CHANGE UP (ref 98–110)
CHLORIDE SERPL-SCNC: 103 MMOL/L — SIGNIFICANT CHANGE UP (ref 98–110)
CHLORIDE SERPL-SCNC: 103 MMOL/L — SIGNIFICANT CHANGE UP (ref 98–110)
CO2 SERPL-SCNC: 28 MMOL/L — SIGNIFICANT CHANGE UP (ref 17–32)
CO2 SERPL-SCNC: 28 MMOL/L — SIGNIFICANT CHANGE UP (ref 17–32)
CO2 SERPL-SCNC: 31 MMOL/L — SIGNIFICANT CHANGE UP (ref 17–32)
CO2 SERPL-SCNC: 31 MMOL/L — SIGNIFICANT CHANGE UP (ref 17–32)
CREAT SERPL-MCNC: 1.9 MG/DL — HIGH (ref 0.7–1.5)
CREAT SERPL-MCNC: 1.9 MG/DL — HIGH (ref 0.7–1.5)
CREAT SERPL-MCNC: 2 MG/DL — HIGH (ref 0.7–1.5)
CREAT SERPL-MCNC: 2 MG/DL — HIGH (ref 0.7–1.5)
EGFR: 34 ML/MIN/1.73M2 — LOW
EGFR: 34 ML/MIN/1.73M2 — LOW
EGFR: 36 ML/MIN/1.73M2 — LOW
EGFR: 36 ML/MIN/1.73M2 — LOW
EOSINOPHIL # BLD AUTO: 0.4 K/UL — SIGNIFICANT CHANGE UP (ref 0–0.7)
EOSINOPHIL # BLD AUTO: 0.4 K/UL — SIGNIFICANT CHANGE UP (ref 0–0.7)
EOSINOPHIL NFR BLD AUTO: 6.7 % — SIGNIFICANT CHANGE UP (ref 0–8)
EOSINOPHIL NFR BLD AUTO: 6.7 % — SIGNIFICANT CHANGE UP (ref 0–8)
GLUCOSE SERPL-MCNC: 111 MG/DL — HIGH (ref 70–99)
GLUCOSE SERPL-MCNC: 111 MG/DL — HIGH (ref 70–99)
GLUCOSE SERPL-MCNC: 125 MG/DL — HIGH (ref 70–99)
GLUCOSE SERPL-MCNC: 125 MG/DL — HIGH (ref 70–99)
HCT VFR BLD CALC: 35.6 % — LOW (ref 42–52)
HCT VFR BLD CALC: 35.6 % — LOW (ref 42–52)
HGB BLD-MCNC: 11.4 G/DL — LOW (ref 14–18)
HGB BLD-MCNC: 11.4 G/DL — LOW (ref 14–18)
IMM GRANULOCYTES NFR BLD AUTO: 0.2 % — SIGNIFICANT CHANGE UP (ref 0.1–0.3)
IMM GRANULOCYTES NFR BLD AUTO: 0.2 % — SIGNIFICANT CHANGE UP (ref 0.1–0.3)
LYMPHOCYTES # BLD AUTO: 1.66 K/UL — SIGNIFICANT CHANGE UP (ref 1.2–3.4)
LYMPHOCYTES # BLD AUTO: 1.66 K/UL — SIGNIFICANT CHANGE UP (ref 1.2–3.4)
LYMPHOCYTES # BLD AUTO: 27.9 % — SIGNIFICANT CHANGE UP (ref 20.5–51.1)
LYMPHOCYTES # BLD AUTO: 27.9 % — SIGNIFICANT CHANGE UP (ref 20.5–51.1)
MAGNESIUM SERPL-MCNC: 1.9 MG/DL — SIGNIFICANT CHANGE UP (ref 1.8–2.4)
MAGNESIUM SERPL-MCNC: 1.9 MG/DL — SIGNIFICANT CHANGE UP (ref 1.8–2.4)
MAGNESIUM SERPL-MCNC: 2 MG/DL — SIGNIFICANT CHANGE UP (ref 1.8–2.4)
MAGNESIUM SERPL-MCNC: 2 MG/DL — SIGNIFICANT CHANGE UP (ref 1.8–2.4)
MCHC RBC-ENTMCNC: 27 PG — SIGNIFICANT CHANGE UP (ref 27–31)
MCHC RBC-ENTMCNC: 27 PG — SIGNIFICANT CHANGE UP (ref 27–31)
MCHC RBC-ENTMCNC: 32 G/DL — SIGNIFICANT CHANGE UP (ref 32–37)
MCHC RBC-ENTMCNC: 32 G/DL — SIGNIFICANT CHANGE UP (ref 32–37)
MCV RBC AUTO: 84.4 FL — SIGNIFICANT CHANGE UP (ref 80–94)
MCV RBC AUTO: 84.4 FL — SIGNIFICANT CHANGE UP (ref 80–94)
MONOCYTES # BLD AUTO: 0.7 K/UL — HIGH (ref 0.1–0.6)
MONOCYTES # BLD AUTO: 0.7 K/UL — HIGH (ref 0.1–0.6)
MONOCYTES NFR BLD AUTO: 11.8 % — HIGH (ref 1.7–9.3)
MONOCYTES NFR BLD AUTO: 11.8 % — HIGH (ref 1.7–9.3)
NEUTROPHILS # BLD AUTO: 3.15 K/UL — SIGNIFICANT CHANGE UP (ref 1.4–6.5)
NEUTROPHILS # BLD AUTO: 3.15 K/UL — SIGNIFICANT CHANGE UP (ref 1.4–6.5)
NEUTROPHILS NFR BLD AUTO: 52.9 % — SIGNIFICANT CHANGE UP (ref 42.2–75.2)
NEUTROPHILS NFR BLD AUTO: 52.9 % — SIGNIFICANT CHANGE UP (ref 42.2–75.2)
NRBC # BLD: 0 /100 WBCS — SIGNIFICANT CHANGE UP (ref 0–0)
NRBC # BLD: 0 /100 WBCS — SIGNIFICANT CHANGE UP (ref 0–0)
PLATELET # BLD AUTO: 221 K/UL — SIGNIFICANT CHANGE UP (ref 130–400)
PLATELET # BLD AUTO: 221 K/UL — SIGNIFICANT CHANGE UP (ref 130–400)
PMV BLD: 11.3 FL — HIGH (ref 7.4–10.4)
PMV BLD: 11.3 FL — HIGH (ref 7.4–10.4)
POTASSIUM SERPL-MCNC: 3.9 MMOL/L — SIGNIFICANT CHANGE UP (ref 3.5–5)
POTASSIUM SERPL-MCNC: 3.9 MMOL/L — SIGNIFICANT CHANGE UP (ref 3.5–5)
POTASSIUM SERPL-MCNC: 4.5 MMOL/L — SIGNIFICANT CHANGE UP (ref 3.5–5)
POTASSIUM SERPL-MCNC: 4.5 MMOL/L — SIGNIFICANT CHANGE UP (ref 3.5–5)
POTASSIUM SERPL-SCNC: 3.9 MMOL/L — SIGNIFICANT CHANGE UP (ref 3.5–5)
POTASSIUM SERPL-SCNC: 3.9 MMOL/L — SIGNIFICANT CHANGE UP (ref 3.5–5)
POTASSIUM SERPL-SCNC: 4.5 MMOL/L — SIGNIFICANT CHANGE UP (ref 3.5–5)
POTASSIUM SERPL-SCNC: 4.5 MMOL/L — SIGNIFICANT CHANGE UP (ref 3.5–5)
PROT SERPL-MCNC: 5.1 G/DL — LOW (ref 6–8)
PROT SERPL-MCNC: 5.1 G/DL — LOW (ref 6–8)
RBC # BLD: 4.22 M/UL — LOW (ref 4.7–6.1)
RBC # BLD: 4.22 M/UL — LOW (ref 4.7–6.1)
RBC # FLD: 14.1 % — SIGNIFICANT CHANGE UP (ref 11.5–14.5)
RBC # FLD: 14.1 % — SIGNIFICANT CHANGE UP (ref 11.5–14.5)
SODIUM SERPL-SCNC: 141 MMOL/L — SIGNIFICANT CHANGE UP (ref 135–146)
SODIUM SERPL-SCNC: 141 MMOL/L — SIGNIFICANT CHANGE UP (ref 135–146)
SODIUM SERPL-SCNC: 142 MMOL/L — SIGNIFICANT CHANGE UP (ref 135–146)
SODIUM SERPL-SCNC: 142 MMOL/L — SIGNIFICANT CHANGE UP (ref 135–146)
WBC # BLD: 5.95 K/UL — SIGNIFICANT CHANGE UP (ref 4.8–10.8)
WBC # BLD: 5.95 K/UL — SIGNIFICANT CHANGE UP (ref 4.8–10.8)
WBC # FLD AUTO: 5.95 K/UL — SIGNIFICANT CHANGE UP (ref 4.8–10.8)
WBC # FLD AUTO: 5.95 K/UL — SIGNIFICANT CHANGE UP (ref 4.8–10.8)

## 2023-10-21 PROCEDURE — 99232 SBSQ HOSP IP/OBS MODERATE 35: CPT

## 2023-10-21 RX ORDER — BUMETANIDE 0.25 MG/ML
2 INJECTION INTRAMUSCULAR; INTRAVENOUS EVERY 12 HOURS
Refills: 0 | Status: COMPLETED | OUTPATIENT
Start: 2023-10-21 | End: 2023-10-21

## 2023-10-21 RX ADMIN — TAMSULOSIN HYDROCHLORIDE 0.4 MILLIGRAM(S): 0.4 CAPSULE ORAL at 21:24

## 2023-10-21 RX ADMIN — SPIRONOLACTONE 25 MILLIGRAM(S): 25 TABLET, FILM COATED ORAL at 05:37

## 2023-10-21 RX ADMIN — PANTOPRAZOLE SODIUM 40 MILLIGRAM(S): 20 TABLET, DELAYED RELEASE ORAL at 05:37

## 2023-10-21 RX ADMIN — BUMETANIDE 2 MILLIGRAM(S): 0.25 INJECTION INTRAMUSCULAR; INTRAVENOUS at 21:24

## 2023-10-21 RX ADMIN — APIXABAN 5 MILLIGRAM(S): 2.5 TABLET, FILM COATED ORAL at 17:18

## 2023-10-21 RX ADMIN — RANOLAZINE 500 MILLIGRAM(S): 500 TABLET, FILM COATED, EXTENDED RELEASE ORAL at 05:37

## 2023-10-21 RX ADMIN — APIXABAN 5 MILLIGRAM(S): 2.5 TABLET, FILM COATED ORAL at 05:39

## 2023-10-21 RX ADMIN — MYCOPHENOLATE MOFETIL 500 MILLIGRAM(S): 250 CAPSULE ORAL at 17:19

## 2023-10-21 RX ADMIN — DAPAGLIFLOZIN 10 MILLIGRAM(S): 10 TABLET, FILM COATED ORAL at 11:45

## 2023-10-21 RX ADMIN — RANOLAZINE 500 MILLIGRAM(S): 500 TABLET, FILM COATED, EXTENDED RELEASE ORAL at 17:18

## 2023-10-21 RX ADMIN — Medication 2: at 12:31

## 2023-10-21 RX ADMIN — Medication 175 MICROGRAM(S): at 05:37

## 2023-10-21 RX ADMIN — BUMETANIDE 2 MILLIGRAM(S): 0.25 INJECTION INTRAMUSCULAR; INTRAVENOUS at 11:43

## 2023-10-21 RX ADMIN — MYCOPHENOLATE MOFETIL 500 MILLIGRAM(S): 250 CAPSULE ORAL at 05:36

## 2023-10-21 RX ADMIN — SACUBITRIL AND VALSARTAN 1 TABLET(S): 24; 26 TABLET, FILM COATED ORAL at 05:36

## 2023-10-21 RX ADMIN — ATORVASTATIN CALCIUM 40 MILLIGRAM(S): 80 TABLET, FILM COATED ORAL at 21:24

## 2023-10-21 RX ADMIN — Medication 100 MILLIGRAM(S): at 05:37

## 2023-10-21 RX ADMIN — SACUBITRIL AND VALSARTAN 1 TABLET(S): 24; 26 TABLET, FILM COATED ORAL at 17:19

## 2023-10-21 RX ADMIN — Medication 145 MILLIGRAM(S): at 11:45

## 2023-10-21 RX ADMIN — INSULIN GLARGINE 12 UNIT(S): 100 INJECTION, SOLUTION SUBCUTANEOUS at 21:25

## 2023-10-21 NOTE — PROGRESS NOTE ADULT - ASSESSMENT
79 y/o male severe systolic HF, ICM, CAD s/p CABG who presents with ADHF. On exam today he is warm, well perfused, and hypervolemic but much improved. He is tolerating uptitration of GDMT. I suspect he will require 1-2 more days of IV diuresis    Plan:  - IV bumex 2 mg BID for net negative goal 2L. Re-evaluate transition to PO tomorrow  - GDMT: continue arun 25, entresto 49/51, dapa 10 mg, metop  mg.   - Continue vericiguat  - Continue anticoagulation  - Continue fenofibrate and atorvastatin  - Continue synthyroid  - Diabetic management    Magnus Huerta MD  Interventional Heart Failure

## 2023-10-21 NOTE — PROGRESS NOTE ADULT - SUBJECTIVE AND OBJECTIVE BOX
CCU Attending Note    ALYSSA TILLEY  MRN-[id number]    Date of Admission: 10-17-23    Brief HPI:  77 y/o male PMH HFrEF, ICM who presents with ADHF.     24 hr events:  NAEON. Feeling better    apixaban 5 milliGRAM(s) Oral every 12 hours  atorvastatin 40 milliGRAM(s) Oral at bedtime  buMETAnide Injectable 2 milliGRAM(s) IV Push every 12 hours  chlorhexidine 2% Cloths 1 Application(s) Topical daily  dapagliflozin 10 milliGRAM(s) Oral every 24 hours  fenofibrate Tablet 145 milliGRAM(s) Oral daily  glucagon  Injectable 1 milliGRAM(s) IntraMuscular once  insulin glargine Injectable (LANTUS) 12 Unit(s) SubCutaneous at bedtime  insulin lispro (ADMELOG) corrective regimen sliding scale   SubCutaneous three times a day before meals  levothyroxine 175 MICROGram(s) Oral daily  metoprolol succinate  milliGRAM(s) Oral daily  mycophenolate mofetil Suspension 500 milliGRAM(s) Oral two times a day  pantoprazole    Tablet 40 milliGRAM(s) Oral before breakfast  ranolazine 500 milliGRAM(s) Oral two times a day  sacubitril 49 mG/valsartan 51 mG 1 Tablet(s) Oral two times a day  spironolactone 25 milliGRAM(s) Oral daily  tamsulosin 0.4 milliGRAM(s) Oral at bedtime  Vericiguat 2.5 milliGRAM(s) 2.5 milliGRAM(s) Oral daily      PHYSICAL EXAM:  T(C): 36.7 (10-21-23 @ 08:08), Max: 36.7 (10-21-23 @ 08:08)  T(F): 98 (10-21-23 @ 08:08), Max: 98 (10-21-23 @ 08:08)  HR: 69 (10-21-23 @ 11:43) (69 - 99)  BP: 118/65 (10-21-23 @ 11:43) (100/61 - 137/74)  RR: 18 (10-21-23 @ 11:43) (18 - 18)  SpO2: 95% (10-21-23 @ 08:08) (95% - 99%)      10-18-23 @ 07:01  -  10-19-23 @ 07:00  --------------------------------------------------------  IN: 938 mL / OUT: 1320 mL / NET: -382 mL    10-19-23 @ 07:01  -  10-20-23 @ 07:00  --------------------------------------------------------  IN: 864 mL / OUT: 1790 mL / NET: -926 mL    10-20-23 @ 07:01  -  10-21-23 @ 07:00  --------------------------------------------------------  IN: 1014 mL / OUT: 1350 mL / NET: -336 mL    10-21-23 @ 07:01  -  10-21-23 @ 13:03  --------------------------------------------------------  IN: 416 mL / OUT: 100 mL / NET: 316 mL      I&O's Detail    20 Oct 2023 07:01  -  21 Oct 2023 07:00  --------------------------------------------------------  IN:    Oral Fluid: 1014 mL  Total IN: 1014 mL    OUT:    Voided (mL): 1350 mL  Total OUT: 1350 mL    Total NET: -336 mL      21 Oct 2023 07:01  -  21 Oct 2023 13:03  --------------------------------------------------------  IN:    Oral Fluid: 416 mL  Total IN: 416 mL    OUT:    Voided (mL): 100 mL  Total OUT: 100 mL    Total NET: 316 mL      General Appearance: no acute distress	  Cardiovascular: regular rate and rhythm. JVP ~13cm.  Respiratory: fine bibasilar crackles  Psychiatry: Alert and oriented x 3, Mood & affect appropriate  Gastrointestinal:  Soft, Non-tender  Skin/Integumen: warm  Neurologic: Non-focal  Musculoskeletal/ extremities: +1 edema BLE  Vascular: Peripheral pulses palpable 2+ bilaterally    LABS:	 	                        11.4   5.95  )-----------( 221      ( 21 Oct 2023 06:46 )             35.6     10-21    141  |  103  |  42<H>  ----------------------------<  125<H>  3.9   |  28  |  1.9<H>    Ca    8.9      21 Oct 2023 06:46  Mg     1.9     10-21    TPro  5.1<L>  /  Alb  3.1<L>  /  TBili  0.4  /  DBili  x   /  AST  18  /  ALT  10  /  AlkPhos  55  10-21    CARDIAC TESTING:  No new studies    OTHER DIAGNOSTIC TESTING:  No new studies

## 2023-10-22 LAB
ALBUMIN SERPL ELPH-MCNC: 3.3 G/DL — LOW (ref 3.5–5.2)
ALBUMIN SERPL ELPH-MCNC: 3.3 G/DL — LOW (ref 3.5–5.2)
ALP SERPL-CCNC: 61 U/L — SIGNIFICANT CHANGE UP (ref 30–115)
ALP SERPL-CCNC: 61 U/L — SIGNIFICANT CHANGE UP (ref 30–115)
ALT FLD-CCNC: 10 U/L — SIGNIFICANT CHANGE UP (ref 0–41)
ALT FLD-CCNC: 10 U/L — SIGNIFICANT CHANGE UP (ref 0–41)
ANION GAP SERPL CALC-SCNC: 10 MMOL/L — SIGNIFICANT CHANGE UP (ref 7–14)
ANION GAP SERPL CALC-SCNC: 10 MMOL/L — SIGNIFICANT CHANGE UP (ref 7–14)
AST SERPL-CCNC: 20 U/L — SIGNIFICANT CHANGE UP (ref 0–41)
AST SERPL-CCNC: 20 U/L — SIGNIFICANT CHANGE UP (ref 0–41)
BASOPHILS # BLD AUTO: 0.03 K/UL — SIGNIFICANT CHANGE UP (ref 0–0.2)
BASOPHILS # BLD AUTO: 0.03 K/UL — SIGNIFICANT CHANGE UP (ref 0–0.2)
BASOPHILS NFR BLD AUTO: 0.5 % — SIGNIFICANT CHANGE UP (ref 0–1)
BASOPHILS NFR BLD AUTO: 0.5 % — SIGNIFICANT CHANGE UP (ref 0–1)
BILIRUB SERPL-MCNC: 0.4 MG/DL — SIGNIFICANT CHANGE UP (ref 0.2–1.2)
BILIRUB SERPL-MCNC: 0.4 MG/DL — SIGNIFICANT CHANGE UP (ref 0.2–1.2)
BUN SERPL-MCNC: 48 MG/DL — HIGH (ref 10–20)
BUN SERPL-MCNC: 48 MG/DL — HIGH (ref 10–20)
CALCIUM SERPL-MCNC: 9 MG/DL — SIGNIFICANT CHANGE UP (ref 8.4–10.5)
CALCIUM SERPL-MCNC: 9 MG/DL — SIGNIFICANT CHANGE UP (ref 8.4–10.5)
CHLORIDE SERPL-SCNC: 102 MMOL/L — SIGNIFICANT CHANGE UP (ref 98–110)
CHLORIDE SERPL-SCNC: 102 MMOL/L — SIGNIFICANT CHANGE UP (ref 98–110)
CO2 SERPL-SCNC: 28 MMOL/L — SIGNIFICANT CHANGE UP (ref 17–32)
CO2 SERPL-SCNC: 28 MMOL/L — SIGNIFICANT CHANGE UP (ref 17–32)
CREAT SERPL-MCNC: 2 MG/DL — HIGH (ref 0.7–1.5)
CREAT SERPL-MCNC: 2 MG/DL — HIGH (ref 0.7–1.5)
EGFR: 34 ML/MIN/1.73M2 — LOW
EGFR: 34 ML/MIN/1.73M2 — LOW
EOSINOPHIL # BLD AUTO: 0.43 K/UL — SIGNIFICANT CHANGE UP (ref 0–0.7)
EOSINOPHIL # BLD AUTO: 0.43 K/UL — SIGNIFICANT CHANGE UP (ref 0–0.7)
EOSINOPHIL NFR BLD AUTO: 7.5 % — SIGNIFICANT CHANGE UP (ref 0–8)
EOSINOPHIL NFR BLD AUTO: 7.5 % — SIGNIFICANT CHANGE UP (ref 0–8)
GLUCOSE SERPL-MCNC: 126 MG/DL — HIGH (ref 70–99)
GLUCOSE SERPL-MCNC: 126 MG/DL — HIGH (ref 70–99)
HCT VFR BLD CALC: 36.9 % — LOW (ref 42–52)
HCT VFR BLD CALC: 36.9 % — LOW (ref 42–52)
HGB BLD-MCNC: 11.9 G/DL — LOW (ref 14–18)
HGB BLD-MCNC: 11.9 G/DL — LOW (ref 14–18)
IMM GRANULOCYTES NFR BLD AUTO: 0.3 % — SIGNIFICANT CHANGE UP (ref 0.1–0.3)
IMM GRANULOCYTES NFR BLD AUTO: 0.3 % — SIGNIFICANT CHANGE UP (ref 0.1–0.3)
LYMPHOCYTES # BLD AUTO: 1.98 K/UL — SIGNIFICANT CHANGE UP (ref 1.2–3.4)
LYMPHOCYTES # BLD AUTO: 1.98 K/UL — SIGNIFICANT CHANGE UP (ref 1.2–3.4)
LYMPHOCYTES # BLD AUTO: 34.6 % — SIGNIFICANT CHANGE UP (ref 20.5–51.1)
LYMPHOCYTES # BLD AUTO: 34.6 % — SIGNIFICANT CHANGE UP (ref 20.5–51.1)
MAGNESIUM SERPL-MCNC: 2 MG/DL — SIGNIFICANT CHANGE UP (ref 1.8–2.4)
MAGNESIUM SERPL-MCNC: 2 MG/DL — SIGNIFICANT CHANGE UP (ref 1.8–2.4)
MCHC RBC-ENTMCNC: 27.7 PG — SIGNIFICANT CHANGE UP (ref 27–31)
MCHC RBC-ENTMCNC: 27.7 PG — SIGNIFICANT CHANGE UP (ref 27–31)
MCHC RBC-ENTMCNC: 32.2 G/DL — SIGNIFICANT CHANGE UP (ref 32–37)
MCHC RBC-ENTMCNC: 32.2 G/DL — SIGNIFICANT CHANGE UP (ref 32–37)
MCV RBC AUTO: 85.8 FL — SIGNIFICANT CHANGE UP (ref 80–94)
MCV RBC AUTO: 85.8 FL — SIGNIFICANT CHANGE UP (ref 80–94)
MONOCYTES # BLD AUTO: 0.71 K/UL — HIGH (ref 0.1–0.6)
MONOCYTES # BLD AUTO: 0.71 K/UL — HIGH (ref 0.1–0.6)
MONOCYTES NFR BLD AUTO: 12.4 % — HIGH (ref 1.7–9.3)
MONOCYTES NFR BLD AUTO: 12.4 % — HIGH (ref 1.7–9.3)
NEUTROPHILS # BLD AUTO: 2.56 K/UL — SIGNIFICANT CHANGE UP (ref 1.4–6.5)
NEUTROPHILS # BLD AUTO: 2.56 K/UL — SIGNIFICANT CHANGE UP (ref 1.4–6.5)
NEUTROPHILS NFR BLD AUTO: 44.7 % — SIGNIFICANT CHANGE UP (ref 42.2–75.2)
NEUTROPHILS NFR BLD AUTO: 44.7 % — SIGNIFICANT CHANGE UP (ref 42.2–75.2)
NRBC # BLD: 0 /100 WBCS — SIGNIFICANT CHANGE UP (ref 0–0)
NRBC # BLD: 0 /100 WBCS — SIGNIFICANT CHANGE UP (ref 0–0)
PLATELET # BLD AUTO: 222 K/UL — SIGNIFICANT CHANGE UP (ref 130–400)
PLATELET # BLD AUTO: 222 K/UL — SIGNIFICANT CHANGE UP (ref 130–400)
PMV BLD: 11.5 FL — HIGH (ref 7.4–10.4)
PMV BLD: 11.5 FL — HIGH (ref 7.4–10.4)
POTASSIUM SERPL-MCNC: 4.2 MMOL/L — SIGNIFICANT CHANGE UP (ref 3.5–5)
POTASSIUM SERPL-MCNC: 4.2 MMOL/L — SIGNIFICANT CHANGE UP (ref 3.5–5)
POTASSIUM SERPL-SCNC: 4.2 MMOL/L — SIGNIFICANT CHANGE UP (ref 3.5–5)
POTASSIUM SERPL-SCNC: 4.2 MMOL/L — SIGNIFICANT CHANGE UP (ref 3.5–5)
PROT SERPL-MCNC: 5.2 G/DL — LOW (ref 6–8)
PROT SERPL-MCNC: 5.2 G/DL — LOW (ref 6–8)
RBC # BLD: 4.3 M/UL — LOW (ref 4.7–6.1)
RBC # BLD: 4.3 M/UL — LOW (ref 4.7–6.1)
RBC # FLD: 14.4 % — SIGNIFICANT CHANGE UP (ref 11.5–14.5)
RBC # FLD: 14.4 % — SIGNIFICANT CHANGE UP (ref 11.5–14.5)
SODIUM SERPL-SCNC: 140 MMOL/L — SIGNIFICANT CHANGE UP (ref 135–146)
SODIUM SERPL-SCNC: 140 MMOL/L — SIGNIFICANT CHANGE UP (ref 135–146)
WBC # BLD: 5.73 K/UL — SIGNIFICANT CHANGE UP (ref 4.8–10.8)
WBC # BLD: 5.73 K/UL — SIGNIFICANT CHANGE UP (ref 4.8–10.8)
WBC # FLD AUTO: 5.73 K/UL — SIGNIFICANT CHANGE UP (ref 4.8–10.8)
WBC # FLD AUTO: 5.73 K/UL — SIGNIFICANT CHANGE UP (ref 4.8–10.8)

## 2023-10-22 PROCEDURE — 99232 SBSQ HOSP IP/OBS MODERATE 35: CPT

## 2023-10-22 PROCEDURE — 71045 X-RAY EXAM CHEST 1 VIEW: CPT | Mod: 26

## 2023-10-22 RX ORDER — BUMETANIDE 0.25 MG/ML
2 INJECTION INTRAMUSCULAR; INTRAVENOUS
Refills: 0 | Status: COMPLETED | OUTPATIENT
Start: 2023-10-22 | End: 2023-10-23

## 2023-10-22 RX ADMIN — MYCOPHENOLATE MOFETIL 500 MILLIGRAM(S): 250 CAPSULE ORAL at 05:40

## 2023-10-22 RX ADMIN — INSULIN GLARGINE 12 UNIT(S): 100 INJECTION, SOLUTION SUBCUTANEOUS at 21:09

## 2023-10-22 RX ADMIN — Medication 175 MICROGRAM(S): at 05:41

## 2023-10-22 RX ADMIN — MYCOPHENOLATE MOFETIL 500 MILLIGRAM(S): 250 CAPSULE ORAL at 17:02

## 2023-10-22 RX ADMIN — RANOLAZINE 500 MILLIGRAM(S): 500 TABLET, FILM COATED, EXTENDED RELEASE ORAL at 05:42

## 2023-10-22 RX ADMIN — APIXABAN 5 MILLIGRAM(S): 2.5 TABLET, FILM COATED ORAL at 17:02

## 2023-10-22 RX ADMIN — SPIRONOLACTONE 25 MILLIGRAM(S): 25 TABLET, FILM COATED ORAL at 05:41

## 2023-10-22 RX ADMIN — Medication 100 MILLIGRAM(S): at 05:41

## 2023-10-22 RX ADMIN — RANOLAZINE 500 MILLIGRAM(S): 500 TABLET, FILM COATED, EXTENDED RELEASE ORAL at 17:02

## 2023-10-22 RX ADMIN — Medication 145 MILLIGRAM(S): at 11:06

## 2023-10-22 RX ADMIN — APIXABAN 5 MILLIGRAM(S): 2.5 TABLET, FILM COATED ORAL at 05:41

## 2023-10-22 RX ADMIN — SACUBITRIL AND VALSARTAN 1 TABLET(S): 24; 26 TABLET, FILM COATED ORAL at 17:02

## 2023-10-22 RX ADMIN — BUMETANIDE 2 MILLIGRAM(S): 0.25 INJECTION INTRAMUSCULAR; INTRAVENOUS at 10:35

## 2023-10-22 RX ADMIN — Medication 2: at 17:08

## 2023-10-22 RX ADMIN — SACUBITRIL AND VALSARTAN 1 TABLET(S): 24; 26 TABLET, FILM COATED ORAL at 05:41

## 2023-10-22 RX ADMIN — PANTOPRAZOLE SODIUM 40 MILLIGRAM(S): 20 TABLET, DELAYED RELEASE ORAL at 05:41

## 2023-10-22 RX ADMIN — ATORVASTATIN CALCIUM 40 MILLIGRAM(S): 80 TABLET, FILM COATED ORAL at 21:10

## 2023-10-22 RX ADMIN — BUMETANIDE 2 MILLIGRAM(S): 0.25 INJECTION INTRAMUSCULAR; INTRAVENOUS at 21:10

## 2023-10-22 RX ADMIN — TAMSULOSIN HYDROCHLORIDE 0.4 MILLIGRAM(S): 0.4 CAPSULE ORAL at 21:09

## 2023-10-22 RX ADMIN — DAPAGLIFLOZIN 10 MILLIGRAM(S): 10 TABLET, FILM COATED ORAL at 11:06

## 2023-10-22 NOTE — PROGRESS NOTE ADULT - ASSESSMENT
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 Patient has worsening shortness of breath x3 weeks, aggravated by lying supine, improved with sitting up.  Associated with bilateral lower extremity swelling. Denies fever, headache, chest pain, abdominal pain, weakness, numbness, dysuria, hematuria, vomiting, diarrhea.    #Acute HFrEF exacerbation (2-3L home O2)  - Trops 0.02 (elevated at baseline), Cr 1.6 (bl 1.8), BNP 8598  - TTE 03/23: EF 16%, G2DD --> new TTE: EF 27%  - CXR: New right basilar opacity/effusion. Left basilar opacity/atelectasis.  - POCUS in ED showed large right pleural effusion, pulm edema and grossly decreased EF   - c/w bumex 2mg iv bid (takes bumex 1mg PO bid at home). Possible switch to PO.  - Fluid restriction 1.5L/d, daily weights, strict Is & Os  - Weight 83.4 --> 83  - c/w Entresto 1tab BID, spironolactone 25mg OD, dapagliflozin 10mg od, ranolazine 500mg BID. 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

## 2023-10-22 NOTE — PROGRESS NOTE ADULT - SUBJECTIVE AND OBJECTIVE BOX
CCU Attending Note    ALYSSA TILLEY  MRN-[id number]    Date of Admission: 10-17-23    Brief HPI:  79 y/o male PMH HFrEF, ICM who presents with ADHF.     24 hr events:  NAEON. Wants to go home    apixaban 5 milliGRAM(s) Oral every 12 hours  atorvastatin 40 milliGRAM(s) Oral at bedtime  buMETAnide Injectable 2 milliGRAM(s) IV Push every 12 hours  chlorhexidine 2% Cloths 1 Application(s) Topical daily  dapagliflozin 10 milliGRAM(s) Oral every 24 hours  fenofibrate Tablet 145 milliGRAM(s) Oral daily  glucagon  Injectable 1 milliGRAM(s) IntraMuscular once  insulin glargine Injectable (LANTUS) 12 Unit(s) SubCutaneous at bedtime  insulin lispro (ADMELOG) corrective regimen sliding scale   SubCutaneous three times a day before meals  levothyroxine 175 MICROGram(s) Oral daily  metoprolol succinate  milliGRAM(s) Oral daily  mycophenolate mofetil Suspension 500 milliGRAM(s) Oral two times a day  pantoprazole    Tablet 40 milliGRAM(s) Oral before breakfast  ranolazine 500 milliGRAM(s) Oral two times a day  sacubitril 49 mG/valsartan 51 mG 1 Tablet(s) Oral two times a day  spironolactone 25 milliGRAM(s) Oral daily  tamsulosin 0.4 milliGRAM(s) Oral at bedtime  Vericiguat 2.5 milliGRAM(s) 2.5 milliGRAM(s) Oral daily      PHYSICAL EXAM:  T(C): 36.7 (10-21-23 @ 08:08), Max: 36.7 (10-21-23 @ 08:08)  T(F): 98 (10-21-23 @ 08:08), Max: 98 (10-21-23 @ 08:08)  HR: 69 (10-21-23 @ 11:43) (69 - 99)  BP: 118/65 (10-21-23 @ 11:43) (100/61 - 137/74)  RR: 18 (10-21-23 @ 11:43) (18 - 18)  SpO2: 95% (10-21-23 @ 08:08) (95% - 99%)      10-18-23 @ 07:01  -  10-19-23 @ 07:00  --------------------------------------------------------  IN: 938 mL / OUT: 1320 mL / NET: -382 mL    10-19-23 @ 07:01  -  10-20-23 @ 07:00  --------------------------------------------------------  IN: 864 mL / OUT: 1790 mL / NET: -926 mL    10-20-23 @ 07:01  -  10-21-23 @ 07:00  --------------------------------------------------------  IN: 1014 mL / OUT: 1350 mL / NET: -336 mL    10-21-23 @ 07:01  -  10-21-23 @ 13:03  --------------------------------------------------------  IN: 416 mL / OUT: 100 mL / NET: 316 mL      I&O's Detail    20 Oct 2023 07:01  -  21 Oct 2023 07:00  --------------------------------------------------------  IN:    Oral Fluid: 1014 mL  Total IN: 1014 mL    OUT:    Voided (mL): 1350 mL  Total OUT: 1350 mL    Total NET: -336 mL      21 Oct 2023 07:01  -  21 Oct 2023 13:03  --------------------------------------------------------  IN:    Oral Fluid: 416 mL  Total IN: 416 mL    OUT:    Voided (mL): 100 mL  Total OUT: 100 mL    Total NET: 316 mL      General Appearance: no acute distress	  Cardiovascular: regular rate and rhythm. JVP ~13cm.  Respiratory: fine bibasilar crackles  Psychiatry: Alert and oriented x 3, Mood & affect appropriate  Gastrointestinal:  Soft, Non-tender  Skin/Integumen: warm  Neurologic: Non-focal  Musculoskeletal/ extremities: +1 edema BLE  Vascular: Peripheral pulses palpable 2+ bilaterally    LABS:	 	                        11.4   5.95  )-----------( 221      ( 21 Oct 2023 06:46 )             35.6     10-21    141  |  103  |  42<H>  ----------------------------<  125<H>  3.9   |  28  |  1.9<H>    Ca    8.9      21 Oct 2023 06:46  Mg     1.9     10-21    TPro  5.1<L>  /  Alb  3.1<L>  /  TBili  0.4  /  DBili  x   /  AST  18  /  ALT  10  /  AlkPhos  55  10-21    CARDIAC TESTING:  No new studies    OTHER DIAGNOSTIC TESTING:  No new studies

## 2023-10-22 NOTE — PROGRESS NOTE ADULT - SUBJECTIVE AND OBJECTIVE BOX
24H events:    Patient is a 78y old Male who presents with a chief complaint of   Primary diagnosis of CHF exacerbation    Today is hospital day 5d. This morning patient was seen and examined at bedside, resting comfortably in bed.    No acute or major events overnight.    Code Status: Full      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      SOCIAL HISTORY:  Social History:      ALLERGIES:  contrast media (iodine-based) (Other)    MEDICATIONS:  STANDING MEDICATIONS  apixaban 5 milliGRAM(s) Oral every 12 hours  atorvastatin 40 milliGRAM(s) Oral at bedtime  chlorhexidine 2% Cloths 1 Application(s) Topical daily  dapagliflozin 10 milliGRAM(s) Oral every 24 hours  fenofibrate Tablet 145 milliGRAM(s) Oral daily  glucagon  Injectable 1 milliGRAM(s) IntraMuscular once  insulin glargine Injectable (LANTUS) 12 Unit(s) SubCutaneous at bedtime  insulin lispro (ADMELOG) corrective regimen sliding scale   SubCutaneous three times a day before meals  levothyroxine 175 MICROGram(s) Oral daily  metoprolol succinate  milliGRAM(s) Oral daily  mycophenolate mofetil Suspension 500 milliGRAM(s) Oral two times a day  pantoprazole    Tablet 40 milliGRAM(s) Oral before breakfast  ranolazine 500 milliGRAM(s) Oral two times a day  sacubitril 49 mG/valsartan 51 mG 1 Tablet(s) Oral two times a day  spironolactone 25 milliGRAM(s) Oral daily  tamsulosin 0.4 milliGRAM(s) Oral at bedtime  Vericiguat 2.5 milliGRAM(s) 2.5 milliGRAM(s) Oral daily    PRN MEDICATIONS    VITALS:   T(F): 97.9  HR: 85  BP: 101/53  RR: 18  SpO2: 97%    PHYSICAL EXAM:  GENERAL:   ( x ) NAD, lying in bed comfortably     (  ) obtunded     (  ) lethargic     (  ) somnolent    HEAD:   ( x ) Atraumatic     (  ) hematoma     (  ) laceration (specify location:       )     NECK:  ( x ) Supple     (  ) neck stiffness     (  ) nuchal rigidity     (  )  no JVD     ( x ) JVD present (  cm)    HEART:  Rate -->     ( x ) normal rate     (  ) bradycardic     (  ) tachycardic  Rhythm -->     ( x ) regular     (  ) regularly irregular     (  ) irregularly irregular  Murmurs -->     ( x ) normal s1s2     (  ) systolic murmur     (  ) diastolic murmur     (  ) continuous murmur      (  ) S3 present     (  ) S4 present    LUNGS:   ( x )Unlabored respirations     (  ) tachypnea  (  ) B/L air entry     ( x ) decreased breath sounds in:  (location RLL    )    (  ) no adventitious sound     ( x ) crackles     (  ) wheezing      (  ) rhonchi      (specify location:       )  (  ) chest wall tenderness (specify location:       )    ABDOMEN:   ( x ) Soft     (  ) tense   |   ( x ) nondistended     (  ) distended   |   ( x ) +BS     (  ) hypoactive bowel sounds     (  ) hyperactive bowel sounds  ( x ) nontender     (  ) RUQ tenderness     (  ) RLQ tenderness     (  ) LLQ tenderness     (  ) epigastric tenderness     (  ) diffuse tenderness  (  ) Splenomegaly      (  ) Hepatomegaly      (  ) Jaundice     (  ) ecchymosis     EXTREMITIES:  (  ) Normal     (  ) Rash     (  ) ecchymosis     (  ) varicose veins      ( x ) pitting edema     (  ) non-pitting edema   (  ) ulceration     (  ) gangrene:     (location:     )    NERVOUS SYSTEM:    ( x ) A&Ox3     (  ) confused     (  ) lethargic  CN II-XII:     ( x ) Intact     (  ) deficits found     (Specify:     )   Upper extremities:     ( x ) no sensorimotor deficits     (  ) weakness     (  ) loss of proprioception/vibration     (  ) loss of touch/temperature (specify:    )  Lower extremities:     ( x ) no sensorimotor deficits     (  ) weakness     (  ) loss of proprioception/vibration     (  ) loss of touch/temperature (specify:    )    SKIN:   ( x ) No rashes or lesions     (  ) maculopapular rash     (  ) pustules     (  ) vesicles     (  ) ulcer     (  ) ecchymosis     (specify location:     )    (  ) Indwelling Orta Catheter:   Date insterted:    Reason (  ) Critical illness     (  ) urinary retention    (  ) Accurate Ins/Outs Monitoring     (  ) CMO patient    (  ) Central Line:   Date inserted:  Location: (  ) Right IJ     (  ) Left IJ     (  ) Right Fem     (  ) Left Fem    (  ) SPC        (  ) pigtail       (  ) PEG tube       (  ) colostomy       (  ) jejunostomy  (  ) U-Dall    LABS:                        11.4   5.95  )-----------( 221      ( 21 Oct 2023 06:46 )             35.6     10-21    142  |  101  |  44<H>  ----------------------------<  111<H>  4.5   |  31  |  2.0<H>    Ca    9.3      21 Oct 2023 17:17  Mg     2.0     10-21    TPro  5.1<L>  /  Alb  3.1<L>  /  TBili  0.4  /  DBili  x   /  AST  18  /  ALT  10  /  AlkPhos  55  10-21      Urinalysis Basic - ( 21 Oct 2023 17:17 )    Color: x / Appearance: x / SG: x / pH: x  Gluc: 111 mg/dL / Ketone: x  / Bili: x / Urobili: x   Blood: x / Protein: x / Nitrite: x   Leuk Esterase: x / RBC: x / WBC x   Sq Epi: x / Non Sq Epi: x / Bacteria: x                RADIOLOGY:

## 2023-10-22 NOTE — PROGRESS NOTE ADULT - ASSESSMENT
77 y/o male severe systolic HF, ICM, CAD s/p CABG who presents with ADHF. On exam today he is warm, well perfused, and hypervolemic but much improved. He is tolerating uptitration of GDMT.     Plan:  - IV bumex 2 mg BID for net negative goal 2L. Re-evaluate transition to PO tomorrow  - GDMT: continue arun 25, entresto 49/51, dapa 10 mg, metop  mg.   - Continue vericiguat  - Continue anticoagulation  - Continue fenofibrate and atorvastatin  - Continue synthyroid  - Diabetic management    Magnus Huerta MD  Interventional Heart Failure

## 2023-10-22 NOTE — PROGRESS NOTE ADULT - SUBJECTIVE AND OBJECTIVE BOX
SUBJECTIVE:    Patient is a 78y old admitted to medicine with the primary diagnosis of CHF exacerbation.     Today is hospital day 5d. This morning he is resting comfortably in bed and reports no new issues or overnight events.     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        ALLERGIES:  contrast media (iodine-based) (Other)    MEDICATIONS:  STANDING MEDICATIONS  apixaban 5 milliGRAM(s) Oral every 12 hours  atorvastatin 40 milliGRAM(s) Oral at bedtime  buMETAnide Injectable 2 milliGRAM(s) IV Push two times a day  chlorhexidine 2% Cloths 1 Application(s) Topical daily  dapagliflozin 10 milliGRAM(s) Oral every 24 hours  fenofibrate Tablet 145 milliGRAM(s) Oral daily  glucagon  Injectable 1 milliGRAM(s) IntraMuscular once  insulin glargine Injectable (LANTUS) 12 Unit(s) SubCutaneous at bedtime  insulin lispro (ADMELOG) corrective regimen sliding scale   SubCutaneous three times a day before meals  levothyroxine 175 MICROGram(s) Oral daily  metoprolol succinate  milliGRAM(s) Oral daily  mycophenolate mofetil Suspension 500 milliGRAM(s) Oral two times a day  pantoprazole    Tablet 40 milliGRAM(s) Oral before breakfast  ranolazine 500 milliGRAM(s) Oral two times a day  sacubitril 49 mG/valsartan 51 mG 1 Tablet(s) Oral two times a day  spironolactone 25 milliGRAM(s) Oral daily  tamsulosin 0.4 milliGRAM(s) Oral at bedtime  Vericiguat 2.5 milliGRAM(s) 2.5 milliGRAM(s) Oral daily    PRN MEDICATIONS    VITALS:   T(F): 97.8  HR: 75  BP: 112/67  RR: 16  SpO2: 98%    LABS:                        11.9   5.73  )-----------( 222      ( 22 Oct 2023 05:36 )             36.9     10-22    140  |  102  |  48<H>  ----------------------------<  126<H>  4.2   |  28  |  2.0<H>    Ca    9.0      22 Oct 2023 05:36  Mg     2.0     10-22    TPro  5.2<L>  /  Alb  3.3<L>  /  TBili  0.4  /  DBili  x   /  AST  20  /  ALT  10  /  AlkPhos  61  10-22      Urinalysis Basic - ( 22 Oct 2023 05:36 )    Color: x / Appearance: x / SG: x / pH: x  Gluc: 126 mg/dL / Ketone: x  / Bili: x / Urobili: x   Blood: x / Protein: x / Nitrite: x   Leuk Esterase: x / RBC: x / WBC x   Sq Epi: x / Non Sq Epi: x / Bacteria: x                RADIOLOGY:    PHYSICAL EXAM:  GEN: No acute distress  PULM/CHEST: bibasilar crackles  CVS: Regular rate and rhythm, S1-S2, no murmurs  ABD: Soft, non-tender, non-distended, +BS  EXT: b/l LE edema  NEURO: AAOx3    Orta Catheter:

## 2023-10-22 NOTE — PROGRESS NOTE ADULT - NS ATTEST RISK PROBLEM GEN_ALL_CORE FT
Titration of heart failure medications. Discussed plan with family.
Titration of heart failure medications. Discussed plan with family.
Titration of heart failure medications and diuretics. Patient at high risk of decompensation.
Titration of heart failure medications and diuretics. Patient at high risk of decompensation. Discussion with family regarding changes and prognosis.
Titration of heart failure medications and diuretics. Patient at high risk of decompensation.

## 2023-10-22 NOTE — PROGRESS NOTE ADULT - ASSESSMENT
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 Patient has worsening shortness of breath x3 weeks, aggravated by lying supine, improved with sitting up.  Associated with bilateral lower extremity swelling. Denies fever, headache, chest pain, abdominal pain, weakness, numbness, dysuria, hematuria, vomiting, diarrhea.    #Acute HFrEF exacerbation (2-3L home O2)  - Trops 0.02 (elevated at baseline), Cr 1.6 (bl 1.8), BNP 8598  - TTE 03/23: EF 16%, G2DD --> new TTE: EF 27%  - CXR: New right basilar opacity/effusion. Left basilar opacity/atelectasis.  - POCUS in ED showed large right pleural effusion, pulm edema and grossly decreased EF   - Fluid restriction 1.5L/d, daily weights, strict Is & Os  - Weight 83.4 --> 83  - c/w Entresto 1tab BID, spironolactone 25mg OD, dapagliflozin 10mg od, ranolazine 500mg BID. metoprolol succinate 100mg Od  - On vericiguat 2.5mg daily at home (non-formulary form sent)  - Has BiV AICD  - pulm consult- noted: no tap  - On exam today, patient still volume overload, will give IV bumex 2mg BID, stop after 2 doses, will reassess tomorrow  - Switch to PO tomorrow  - Of note pt takes bumex 1mg PO bid at home    #CAD  - On atorvastatin 40mg daily  - Off aspirin    #DM  - c/w home insulin Lantus 12U at bedtime  - c/w pre-meal sliding scale    #Chronic Afib  - On eliquis 5mg BID  - On metoprolol succ 100mg    #Hypothyroidism  - c/w synthroid 175mcg daily  - TSH 0.3    #HLD  - c/w fenofibrate 160mg daily and atorvastatin 40mg daily  - LDL 83    #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

## 2023-10-23 LAB
ALBUMIN SERPL ELPH-MCNC: 3.3 G/DL — LOW (ref 3.5–5.2)
ALBUMIN SERPL ELPH-MCNC: 3.3 G/DL — LOW (ref 3.5–5.2)
ALP SERPL-CCNC: 69 U/L — SIGNIFICANT CHANGE UP (ref 30–115)
ALP SERPL-CCNC: 69 U/L — SIGNIFICANT CHANGE UP (ref 30–115)
ALT FLD-CCNC: 10 U/L — SIGNIFICANT CHANGE UP (ref 0–41)
ALT FLD-CCNC: 10 U/L — SIGNIFICANT CHANGE UP (ref 0–41)
ANION GAP SERPL CALC-SCNC: 12 MMOL/L — SIGNIFICANT CHANGE UP (ref 7–14)
ANION GAP SERPL CALC-SCNC: 12 MMOL/L — SIGNIFICANT CHANGE UP (ref 7–14)
AST SERPL-CCNC: 18 U/L — SIGNIFICANT CHANGE UP (ref 0–41)
AST SERPL-CCNC: 18 U/L — SIGNIFICANT CHANGE UP (ref 0–41)
BASOPHILS # BLD AUTO: 0.04 K/UL — SIGNIFICANT CHANGE UP (ref 0–0.2)
BASOPHILS # BLD AUTO: 0.04 K/UL — SIGNIFICANT CHANGE UP (ref 0–0.2)
BASOPHILS NFR BLD AUTO: 0.6 % — SIGNIFICANT CHANGE UP (ref 0–1)
BASOPHILS NFR BLD AUTO: 0.6 % — SIGNIFICANT CHANGE UP (ref 0–1)
BILIRUB SERPL-MCNC: 0.3 MG/DL — SIGNIFICANT CHANGE UP (ref 0.2–1.2)
BILIRUB SERPL-MCNC: 0.3 MG/DL — SIGNIFICANT CHANGE UP (ref 0.2–1.2)
BUN SERPL-MCNC: 55 MG/DL — HIGH (ref 10–20)
BUN SERPL-MCNC: 55 MG/DL — HIGH (ref 10–20)
CALCIUM SERPL-MCNC: 8.7 MG/DL — SIGNIFICANT CHANGE UP (ref 8.4–10.5)
CALCIUM SERPL-MCNC: 8.7 MG/DL — SIGNIFICANT CHANGE UP (ref 8.4–10.5)
CHLORIDE SERPL-SCNC: 103 MMOL/L — SIGNIFICANT CHANGE UP (ref 98–110)
CHLORIDE SERPL-SCNC: 103 MMOL/L — SIGNIFICANT CHANGE UP (ref 98–110)
CO2 SERPL-SCNC: 26 MMOL/L — SIGNIFICANT CHANGE UP (ref 17–32)
CO2 SERPL-SCNC: 26 MMOL/L — SIGNIFICANT CHANGE UP (ref 17–32)
CREAT SERPL-MCNC: 2.4 MG/DL — HIGH (ref 0.7–1.5)
CREAT SERPL-MCNC: 2.4 MG/DL — HIGH (ref 0.7–1.5)
EGFR: 27 ML/MIN/1.73M2 — LOW
EGFR: 27 ML/MIN/1.73M2 — LOW
EOSINOPHIL # BLD AUTO: 0.42 K/UL — SIGNIFICANT CHANGE UP (ref 0–0.7)
EOSINOPHIL # BLD AUTO: 0.42 K/UL — SIGNIFICANT CHANGE UP (ref 0–0.7)
EOSINOPHIL NFR BLD AUTO: 6.1 % — SIGNIFICANT CHANGE UP (ref 0–8)
EOSINOPHIL NFR BLD AUTO: 6.1 % — SIGNIFICANT CHANGE UP (ref 0–8)
GLUCOSE BLDC GLUCOMTR-MCNC: 133 MG/DL — HIGH (ref 70–99)
GLUCOSE BLDC GLUCOMTR-MCNC: 133 MG/DL — HIGH (ref 70–99)
GLUCOSE BLDC GLUCOMTR-MCNC: 158 MG/DL — HIGH (ref 70–99)
GLUCOSE BLDC GLUCOMTR-MCNC: 158 MG/DL — HIGH (ref 70–99)
GLUCOSE SERPL-MCNC: 129 MG/DL — HIGH (ref 70–99)
GLUCOSE SERPL-MCNC: 129 MG/DL — HIGH (ref 70–99)
HCT VFR BLD CALC: 40.4 % — LOW (ref 42–52)
HCT VFR BLD CALC: 40.4 % — LOW (ref 42–52)
HGB BLD-MCNC: 12.6 G/DL — LOW (ref 14–18)
HGB BLD-MCNC: 12.6 G/DL — LOW (ref 14–18)
IMM GRANULOCYTES NFR BLD AUTO: 0.3 % — SIGNIFICANT CHANGE UP (ref 0.1–0.3)
IMM GRANULOCYTES NFR BLD AUTO: 0.3 % — SIGNIFICANT CHANGE UP (ref 0.1–0.3)
LYMPHOCYTES # BLD AUTO: 2.32 K/UL — SIGNIFICANT CHANGE UP (ref 1.2–3.4)
LYMPHOCYTES # BLD AUTO: 2.32 K/UL — SIGNIFICANT CHANGE UP (ref 1.2–3.4)
LYMPHOCYTES # BLD AUTO: 33.9 % — SIGNIFICANT CHANGE UP (ref 20.5–51.1)
LYMPHOCYTES # BLD AUTO: 33.9 % — SIGNIFICANT CHANGE UP (ref 20.5–51.1)
MAGNESIUM SERPL-MCNC: 1.9 MG/DL — SIGNIFICANT CHANGE UP (ref 1.8–2.4)
MAGNESIUM SERPL-MCNC: 1.9 MG/DL — SIGNIFICANT CHANGE UP (ref 1.8–2.4)
MCHC RBC-ENTMCNC: 26.8 PG — LOW (ref 27–31)
MCHC RBC-ENTMCNC: 26.8 PG — LOW (ref 27–31)
MCHC RBC-ENTMCNC: 31.2 G/DL — LOW (ref 32–37)
MCHC RBC-ENTMCNC: 31.2 G/DL — LOW (ref 32–37)
MCV RBC AUTO: 85.8 FL — SIGNIFICANT CHANGE UP (ref 80–94)
MCV RBC AUTO: 85.8 FL — SIGNIFICANT CHANGE UP (ref 80–94)
MONOCYTES # BLD AUTO: 0.81 K/UL — HIGH (ref 0.1–0.6)
MONOCYTES # BLD AUTO: 0.81 K/UL — HIGH (ref 0.1–0.6)
MONOCYTES NFR BLD AUTO: 11.8 % — HIGH (ref 1.7–9.3)
MONOCYTES NFR BLD AUTO: 11.8 % — HIGH (ref 1.7–9.3)
NEUTROPHILS # BLD AUTO: 3.23 K/UL — SIGNIFICANT CHANGE UP (ref 1.4–6.5)
NEUTROPHILS # BLD AUTO: 3.23 K/UL — SIGNIFICANT CHANGE UP (ref 1.4–6.5)
NEUTROPHILS NFR BLD AUTO: 47.3 % — SIGNIFICANT CHANGE UP (ref 42.2–75.2)
NEUTROPHILS NFR BLD AUTO: 47.3 % — SIGNIFICANT CHANGE UP (ref 42.2–75.2)
NRBC # BLD: 0 /100 WBCS — SIGNIFICANT CHANGE UP (ref 0–0)
NRBC # BLD: 0 /100 WBCS — SIGNIFICANT CHANGE UP (ref 0–0)
PLATELET # BLD AUTO: 232 K/UL — SIGNIFICANT CHANGE UP (ref 130–400)
PLATELET # BLD AUTO: 232 K/UL — SIGNIFICANT CHANGE UP (ref 130–400)
PMV BLD: 11.4 FL — HIGH (ref 7.4–10.4)
PMV BLD: 11.4 FL — HIGH (ref 7.4–10.4)
POTASSIUM SERPL-MCNC: 4.1 MMOL/L — SIGNIFICANT CHANGE UP (ref 3.5–5)
POTASSIUM SERPL-MCNC: 4.1 MMOL/L — SIGNIFICANT CHANGE UP (ref 3.5–5)
POTASSIUM SERPL-SCNC: 4.1 MMOL/L — SIGNIFICANT CHANGE UP (ref 3.5–5)
POTASSIUM SERPL-SCNC: 4.1 MMOL/L — SIGNIFICANT CHANGE UP (ref 3.5–5)
PROT SERPL-MCNC: 5.5 G/DL — LOW (ref 6–8)
PROT SERPL-MCNC: 5.5 G/DL — LOW (ref 6–8)
RBC # BLD: 4.71 M/UL — SIGNIFICANT CHANGE UP (ref 4.7–6.1)
RBC # BLD: 4.71 M/UL — SIGNIFICANT CHANGE UP (ref 4.7–6.1)
RBC # FLD: 14.3 % — SIGNIFICANT CHANGE UP (ref 11.5–14.5)
RBC # FLD: 14.3 % — SIGNIFICANT CHANGE UP (ref 11.5–14.5)
SODIUM SERPL-SCNC: 141 MMOL/L — SIGNIFICANT CHANGE UP (ref 135–146)
SODIUM SERPL-SCNC: 141 MMOL/L — SIGNIFICANT CHANGE UP (ref 135–146)
WBC # BLD: 6.84 K/UL — SIGNIFICANT CHANGE UP (ref 4.8–10.8)
WBC # BLD: 6.84 K/UL — SIGNIFICANT CHANGE UP (ref 4.8–10.8)
WBC # FLD AUTO: 6.84 K/UL — SIGNIFICANT CHANGE UP (ref 4.8–10.8)
WBC # FLD AUTO: 6.84 K/UL — SIGNIFICANT CHANGE UP (ref 4.8–10.8)

## 2023-10-23 PROCEDURE — 99233 SBSQ HOSP IP/OBS HIGH 50: CPT

## 2023-10-23 PROCEDURE — 93284 PRGRMG EVAL IMPLANTABLE DFB: CPT | Mod: 26

## 2023-10-23 PROCEDURE — 71045 X-RAY EXAM CHEST 1 VIEW: CPT | Mod: 26

## 2023-10-23 RX ORDER — SODIUM CHLORIDE 9 MG/ML
250 INJECTION INTRAMUSCULAR; INTRAVENOUS; SUBCUTANEOUS
Refills: 0 | Status: DISCONTINUED | OUTPATIENT
Start: 2023-10-23 | End: 2023-10-24

## 2023-10-23 RX ADMIN — INSULIN GLARGINE 12 UNIT(S): 100 INJECTION, SOLUTION SUBCUTANEOUS at 21:44

## 2023-10-23 RX ADMIN — APIXABAN 5 MILLIGRAM(S): 2.5 TABLET, FILM COATED ORAL at 05:52

## 2023-10-23 RX ADMIN — APIXABAN 5 MILLIGRAM(S): 2.5 TABLET, FILM COATED ORAL at 17:13

## 2023-10-23 RX ADMIN — BUMETANIDE 2 MILLIGRAM(S): 0.25 INJECTION INTRAMUSCULAR; INTRAVENOUS at 05:51

## 2023-10-23 RX ADMIN — Medication 100 MILLIGRAM(S): at 05:52

## 2023-10-23 RX ADMIN — MYCOPHENOLATE MOFETIL 500 MILLIGRAM(S): 250 CAPSULE ORAL at 17:13

## 2023-10-23 RX ADMIN — RANOLAZINE 500 MILLIGRAM(S): 500 TABLET, FILM COATED, EXTENDED RELEASE ORAL at 05:51

## 2023-10-23 RX ADMIN — SACUBITRIL AND VALSARTAN 1 TABLET(S): 24; 26 TABLET, FILM COATED ORAL at 05:51

## 2023-10-23 RX ADMIN — MYCOPHENOLATE MOFETIL 500 MILLIGRAM(S): 250 CAPSULE ORAL at 05:50

## 2023-10-23 RX ADMIN — TAMSULOSIN HYDROCHLORIDE 0.4 MILLIGRAM(S): 0.4 CAPSULE ORAL at 21:44

## 2023-10-23 RX ADMIN — DAPAGLIFLOZIN 10 MILLIGRAM(S): 10 TABLET, FILM COATED ORAL at 11:02

## 2023-10-23 RX ADMIN — SACUBITRIL AND VALSARTAN 1 TABLET(S): 24; 26 TABLET, FILM COATED ORAL at 17:13

## 2023-10-23 RX ADMIN — Medication 2: at 12:04

## 2023-10-23 RX ADMIN — SODIUM CHLORIDE 50 MILLILITER(S): 9 INJECTION INTRAMUSCULAR; INTRAVENOUS; SUBCUTANEOUS at 20:30

## 2023-10-23 RX ADMIN — ATORVASTATIN CALCIUM 40 MILLIGRAM(S): 80 TABLET, FILM COATED ORAL at 21:43

## 2023-10-23 RX ADMIN — Medication 175 MICROGRAM(S): at 05:51

## 2023-10-23 RX ADMIN — SODIUM CHLORIDE 50 MILLILITER(S): 9 INJECTION INTRAMUSCULAR; INTRAVENOUS; SUBCUTANEOUS at 17:02

## 2023-10-23 RX ADMIN — Medication 145 MILLIGRAM(S): at 11:01

## 2023-10-23 RX ADMIN — SPIRONOLACTONE 25 MILLIGRAM(S): 25 TABLET, FILM COATED ORAL at 05:52

## 2023-10-23 RX ADMIN — PANTOPRAZOLE SODIUM 40 MILLIGRAM(S): 20 TABLET, DELAYED RELEASE ORAL at 05:51

## 2023-10-23 RX ADMIN — RANOLAZINE 500 MILLIGRAM(S): 500 TABLET, FILM COATED, EXTENDED RELEASE ORAL at 17:13

## 2023-10-23 NOTE — CHART NOTE - NSCHARTNOTEFT_GEN_A_CORE
Electrophysiology    Pts device _BiV ICD SJM_____ was interrogated on 10-23-23  Device working properly  Events: no events  CorVue under threshold     Programmed ON sync AV mode    Pt is __NOT___ pacemaker dependent   AP  1.6  % /   97  %  AT/AF burden   <1  %  Underlying rhythm NSR  Mode DDD 60-130bpm  Battery 1.6yrs     Preliminary results d/w with primary team  Awaiting / Reviewed by attending    Contact EP ACP with any questions [phone]

## 2023-10-23 NOTE — PROGRESS NOTE ADULT - TIME BILLING
Patient unwilling to see assigned doctor  Care coordination to change attending  Review of hospitalization and prior hospitalization in 3/2023  Bedside evaluation and discussion with patient and wife

## 2023-10-23 NOTE — PROGRESS NOTE ADULT - NS ATTEND AMEND GEN_ALL_CORE FT
78yoM patient of Dr. Godinez with HFrEF (EF 21% in 2021, now 16%) s/p CRT-D, CAD s/p 4v CABG in 2016, pAfib on Eliquis, HTN, HLD, and bullous pemphigus (of the esophagus?) who presented with 3 weeks of worsening dyspnea, orthopnea, and edema.    Patient has been diuresed with Bumex 2 mg IV BID, and his weight has decreased from 86.2 to 82.5 kg. Cr nadired at 1.4, and has been uptrending since, currently 2.4.     On review of prior hospitalization in 3/2023, patient was diuresed with Bumex 2 mg IV BID until his Cr increased to 2.3 on 3/14, at which point his Bumex was held. The following day, Cr remained 2.3 and patient was given 250 cc of NS. Cr remained 2.3 on 3/16, before downtrending to 1.8 on 3/17. Patient was discharged on 3/17 on Bumex 1 mg BID.     On my exam today, patient is lying flat and breathing comfortably. He has trace pitting edema of the bilateral legs, but his wife states his legs are "skinny" in comparison to admission.     Given the patient had a similar presentation for ADHF, received the same dose of diuretics, and then had OVI that improved with IVF, I will treat similarly today.     Plan:   - Hold all diuretics  - 250 NS over 5 hours  - Patient encouraged to drink at least 1L today  - Continue apixaban 5 mg BID, Toprol 100 mg daily, Entresto 49/51 mg BID (was not held during 3/2023 hospitalization when patient had OVI), Farxiga 10 mg daily, vericiguat 2.5 mg daily, and aotrvastatin 40 mg nightly  - Spironolactone held in setting of OVI  - If Cr continues to worsen, will consider holding Entresto  - If Cr < 2.0 tomorrow, will consider discharge on Bumex 1 mg BID with metolazone 2.5 mg PRN for volume overload  - Wife concerned about elevated potassium on admission, however K 5.4 was hemolyzed

## 2023-10-23 NOTE — PROGRESS NOTE ADULT - SUBJECTIVE AND OBJECTIVE BOX
24H events:    Patient is a 78y old Male who presents with a chief complaint of HF exacerbation /Fluid overload (23 Oct 2023 09:44)    Primary diagnosis of CHF exacerbation    Today is hospital day 6d. This morning patient was seen and examined at bedside, resting comfortably in bed.    No acute or major events overnight.    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      SOCIAL HISTORY:  Social History:      ALLERGIES:  contrast media (iodine-based) (Other)    MEDICATIONS:  STANDING MEDICATIONS  apixaban 5 milliGRAM(s) Oral every 12 hours  atorvastatin 40 milliGRAM(s) Oral at bedtime  chlorhexidine 2% Cloths 1 Application(s) Topical daily  dapagliflozin 10 milliGRAM(s) Oral every 24 hours  fenofibrate Tablet 145 milliGRAM(s) Oral daily  glucagon  Injectable 1 milliGRAM(s) IntraMuscular once  insulin glargine Injectable (LANTUS) 12 Unit(s) SubCutaneous at bedtime  insulin lispro (ADMELOG) corrective regimen sliding scale   SubCutaneous three times a day before meals  levothyroxine 175 MICROGram(s) Oral daily  metoprolol succinate  milliGRAM(s) Oral daily  mycophenolate mofetil Suspension 500 milliGRAM(s) Oral two times a day  pantoprazole    Tablet 40 milliGRAM(s) Oral before breakfast  ranolazine 500 milliGRAM(s) Oral two times a day  sacubitril 49 mG/valsartan 51 mG 1 Tablet(s) Oral two times a day  tamsulosin 0.4 milliGRAM(s) Oral at bedtime  Vericiguat 2.5 milliGRAM(s) 2.5 milliGRAM(s) Oral daily    PRN MEDICATIONS    VITALS:   T(F): 97.4  HR: 73  BP: 92/50  RR: 18  SpO2: 99%    PHYSICAL EXAM:  GENERAL:   (x  ) NAD, lying in bed comfortably     (  ) obtunded     (  ) lethargic     (  ) somnolent    HEAD:   ( x ) Atraumatic     (  ) hematoma     (  ) laceration (specify location:       )     NECK:  (x  ) Supple     (  ) neck stiffness     (  ) nuchal rigidity     (  )  no JVD     (  ) JVD present ( -- cm)    HEART:  Rate -->     ( x ) normal rate     (  ) bradycardic     (  ) tachycardic  Rhythm -->     ( x ) regular     (  ) regularly irregular     (  ) irregularly irregular  Murmurs -->     ( x ) normal s1s2     (  ) systolic murmur     (  ) diastolic murmur     (  ) continuous murmur      (  ) S3 present     (  ) S4 present    LUNGS:   ( x )Unlabored respirations     (  ) tachypnea  ( x ) B/L air entry     (  ) decreased breath sounds in:  (location     )    ( x ) no adventitious sound     (  ) crackles     (  ) wheezing      (  ) rhonchi      (specify location:       )  (  ) chest wall tenderness (specify location:       )    ABDOMEN:   (x  ) Soft     (  ) tense   |   (  x) nondistended     (  ) distended   |   ( x ) +BS     (  ) hypoactive bowel sounds     (  ) hyperactive bowel sounds  ( x ) nontender     (  ) RUQ tenderness     (  ) RLQ tenderness     (  ) LLQ tenderness     (  ) epigastric tenderness     (  ) diffuse tenderness  (  ) Splenomegaly      (  ) Hepatomegaly      (  ) Jaundice     (  ) ecchymosis     EXTREMITIES:  (  ) Normal     (  ) Rash     (  ) ecchymosis     (  ) varicose veins      (x  ) pitting edema 2+ , as per patient edema is improving      (  ) non-pitting edema   (  ) ulceration     (  ) gangrene:     (location:     )    NERVOUS SYSTEM:    ( x ) A&Ox3     (  ) confused     (  ) lethargic  CN II-XII:     ( x ) Intact     (  ) deficits found     (Specify:     )   Upper extremities:     (x  ) no sensorimotor deficits     (  ) weakness     (  ) loss of proprioception/vibration     (  ) loss of touch/temperature (specify:    )  Lower extremities:     (x  ) no sensorimotor deficits     (  ) weakness     (  ) loss of proprioception/vibration     (  ) loss of touch/temperature (specify:    )      LABS:                        12.6   6.84  )-----------( 232      ( 23 Oct 2023 06:08 )             40.4     10-23    141  |  103  |  55<H>  ----------------------------<  129<H>  4.1   |  26  |  2.4<H>    Ca    8.7      23 Oct 2023 06:08  Mg     1.9     10-23    TPro  5.5<L>  /  Alb  3.3<L>  /  TBili  0.3  /  DBili  x   /  AST  18  /  ALT  10  /  AlkPhos  69  10-23      Urinalysis Basic - ( 23 Oct 2023 06:08 )    Color: x / Appearance: x / SG: x / pH: x  Gluc: 129 mg/dL / Ketone: x  / Bili: x / Urobili: x   Blood: x / Protein: x / Nitrite: x   Leuk Esterase: x / RBC: x / WBC x   Sq Epi: x / Non Sq Epi: x / Bacteria: x                RADIOLOGY:    CXR 10/23   dual chamber ICD : minimal blunting of right CP angle .       24H events:    Patient is a 78y old Male who presents with a chief complaint of HF exacerbation /Fluid overload (23 Oct 2023 09:44)    Primary diagnosis of CHF exacerbation/ fluid overload     Today is hospital day 6d. This morning patient was seen and examined at bedside, resting comfortably in bed.    No acute or major events overnight.    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      SOCIAL HISTORY:  Social History:      ALLERGIES:  contrast media (iodine-based) (Other)    MEDICATIONS:  STANDING MEDICATIONS  apixaban 5 milliGRAM(s) Oral every 12 hours  atorvastatin 40 milliGRAM(s) Oral at bedtime  chlorhexidine 2% Cloths 1 Application(s) Topical daily  dapagliflozin 10 milliGRAM(s) Oral every 24 hours  fenofibrate Tablet 145 milliGRAM(s) Oral daily  glucagon  Injectable 1 milliGRAM(s) IntraMuscular once  insulin glargine Injectable (LANTUS) 12 Unit(s) SubCutaneous at bedtime  insulin lispro (ADMELOG) corrective regimen sliding scale   SubCutaneous three times a day before meals  levothyroxine 175 MICROGram(s) Oral daily  metoprolol succinate  milliGRAM(s) Oral daily  mycophenolate mofetil Suspension 500 milliGRAM(s) Oral two times a day  pantoprazole    Tablet 40 milliGRAM(s) Oral before breakfast  ranolazine 500 milliGRAM(s) Oral two times a day  sacubitril 49 mG/valsartan 51 mG 1 Tablet(s) Oral two times a day  tamsulosin 0.4 milliGRAM(s) Oral at bedtime  Vericiguat 2.5 milliGRAM(s) 2.5 milliGRAM(s) Oral daily    PRN MEDICATIONS    VITALS:   T(F): 97.4  HR: 73  BP: 92/50  RR: 18  SpO2: 99%    PHYSICAL EXAM:  GENERAL:   (x  ) NAD, lying in bed comfortably     (  ) obtunded     (  ) lethargic     (  ) somnolent    HEAD:   ( x ) Atraumatic     (  ) hematoma     (  ) laceration (specify location:       )     NECK:  (x  ) Supple     (  ) neck stiffness     (  ) nuchal rigidity     (  )  no JVD     (  ) JVD present ( -- cm)    HEART:  Rate -->     ( x ) normal rate     (  ) bradycardic     (  ) tachycardic  Rhythm -->     (  ) regular     (  ) regularly irregular     ( x ) irregularly irregular  Murmurs -->     ( x ) normal s1s2     (  ) systolic murmur     (  ) diastolic murmur     (  ) continuous murmur      (  ) S3 present     (  ) S4 present    LUNGS:   ( x )Unlabored respirations     (  ) tachypnea  ( x ) B/L air entry     (  ) decreased breath sounds in:  (location     )    ( x ) no adventitious sound     (  ) crackles     (  ) wheezing      (  ) rhonchi      (specify location:       )  (  ) chest wall tenderness (specify location:       )    ABDOMEN:   (x  ) Soft     (  ) tense   |   (  x) nondistended     (  ) distended   |   ( x ) +BS     (  ) hypoactive bowel sounds     (  ) hyperactive bowel sounds  ( x ) nontender     (  ) RUQ tenderness     (  ) RLQ tenderness     (  ) LLQ tenderness     (  ) epigastric tenderness     (  ) diffuse tenderness  (  ) Splenomegaly      (  ) Hepatomegaly      (  ) Jaundice     (  ) ecchymosis     EXTREMITIES:  (  ) Normal     (  ) Rash     (  ) ecchymosis     (  ) varicose veins      (x  ) pitting edema 2+ , as per patient edema is improving      (  ) non-pitting edema   (  ) ulceration     (  ) gangrene:     (location:     )    NERVOUS SYSTEM:    ( x ) A&Ox3     (  ) confused     (  ) lethargic  CN II-XII:     ( x ) Intact     (  ) deficits found     (Specify:     )   Upper extremities:     (x  ) no sensorimotor deficits     (  ) weakness     (  ) loss of proprioception/vibration     (  ) loss of touch/temperature (specify:    )  Lower extremities:     (x  ) no sensorimotor deficits     (  ) weakness     (  ) loss of proprioception/vibration     (  ) loss of touch/temperature (specify:    )      LABS:                        12.6   6.84  )-----------( 232      ( 23 Oct 2023 06:08 )             40.4     10-23    141  |  103  |  55<H>  ----------------------------<  129<H>  4.1   |  26  |  2.4<H>    Ca    8.7      23 Oct 2023 06:08  Mg     1.9     10-23    TPro  5.5<L>  /  Alb  3.3<L>  /  TBili  0.3  /  DBili  x   /  AST  18  /  ALT  10  /  AlkPhos  69  10-23      Urinalysis Basic - ( 23 Oct 2023 06:08 )    Color: x / Appearance: x / SG: x / pH: x  Gluc: 129 mg/dL / Ketone: x  / Bili: x / Urobili: x   Blood: x / Protein: x / Nitrite: x   Leuk Esterase: x / RBC: x / WBC x   Sq Epi: x / Non Sq Epi: x / Bacteria: x          No carter catheter           RADIOLOGY:    CXR 10/23   Stable cardiac silhouette post sternotomy.    Stable right basilar opacities. No consolidation or pneumothorax.    Stable osseous structures.

## 2023-10-23 NOTE — PROGRESS NOTE ADULT - ASSESSMENT
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 Patient has worsening shortness of breath x3 weeks, aggravated by lying supine, improved with sitting up.  Associated with bilateral lower extremity swelling. Denies fever, headache, chest pain, abdominal pain, weakness, numbness, dysuria, hematuria, vomiting, diarrhea.    telemetry:  Patient had 6 beats of NSVT       #Acute HFrEF exacerbation (2-3L home O2)  - Trops 0.02 (elevated at baseline), Cr 1.6 (bl 1.8), BNP 8598  - TTE 03/23: EF 16%, G2DD --> new TTE: EF 27%  - POCUS in ED showed large right pleural effusion, pulm edema and grossly decreased EF   - Fluid restriction 1.5L/d, daily weights, strict Is & Os  - Weight 83.4 --> 83  - c/w Entresto 1tab BID, dapagliflozin 10mg od, ranolazine 500mg BID. metoprolol succinate 100mg Od  -spironolactone stopped because of OVI   - On vericiguat 2.5mg daily at home (non-formulary form sent)  - Has BiV AICD  - pulm consult- noted: no tap  - On exam today, patient looks euvolemic ,.  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 Patient has worsening shortness of breath x3 weeks, aggravated by lying supine, improved with sitting up.  Associated with bilateral lower extremity swelling. Denies fever, headache, chest pain, abdominal pain, weakness, numbness, dysuria, hematuria, vomiting, diarrhea.    telemetry:  Patient had 6 beats of NSVT       #Acute HFrEF exacerbation (2-3L home O2)  - Trops 0.02 (elevated at baseline), Cr 1.6 (bl 1.8), BNP 8598  - TTE 03/23: EF 16%, G2DD --> new TTE: EF 27%  - POCUS in ED showed large right pleural effusion, pulm edema and grossly decreased EF   - Fluid restriction 1.5L/d, daily weights, strict Is & Os  - Weight 83.4 --> 83  - c/w Entresto 1tab BID, dapagliflozin 10mg od, ranolazine 500mg BID. metoprolol succinate 100mg Od  -spironolactone stopped because of WILDER   - On vericiguat 2.5mg daily at home (non-formulary form sent)  - Has BiV AICD  -  EP consult placed for AICD evaluation   - pulm consult- noted: no tap  - On exam today, patient looks euvolemic ,.  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 Patient has worsening shortness of breath x3 weeks, aggravated by lying supine, improved with sitting up.  Associated with bilateral lower extremity swelling. Denies fever, headache, chest pain, abdominal pain, weakness, numbness, dysuria, hematuria, vomiting, diarrhea.    telemetry:  Patient had 6 beats of NSVT   -EP consulted       #Acute HFrEF exacerbation (2-3L home O2)  - Trops 0.02 (elevated at baseline), Cr 1.6 (bl 1.8), BNP 8598  - TTE 03/23: EF 16%, G2DD --> new TTE: EF 27%  - POCUS in ED showed large right pleural effusion, pulm edema and grossly decreased EF   - Fluid restriction 1.5L/d, daily weights, strict Is & Os  - Weight 83.4 --> 83-->82.5   - c/w Entresto 1tab BID, dapagliflozin 10mg od, ranolazine 500mg BID. metoprolol succinate 100mg Od  -spironolactone stopped because of WILDER   - On vericiguat 2.5mg daily at home (non-formulary form sent)  - Has BiV AICD  -  EP consult placed for AICD evaluation   - pulm consult- noted: no tap  - On exam today, patient looks euvolemic ,.  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 Patient has worsening shortness of breath x3 weeks, aggravated by lying supine, improved with sitting up.  Associated with bilateral lower extremity swelling. Denies fever, headache, chest pain, abdominal pain, weakness, numbness, dysuria, hematuria, vomiting, diarrhea.    telemetry:  Patient had 6 beats of NSVT   -EP consulted       #Acute HFrEF exacerbation (2-3L home O2)  - Trops 0.02 (elevated at baseline), Cr 1.6 (bl 1.8), BNP 8598  - TTE 03/23: EF 16%, G2DD --> new TTE: EF 27%  - POCUS in ED showed large right pleural effusion, pulm edema and grossly decreased EF   - Fluid restriction 1.5L/d, daily weights, strict Is & Os  - Weight 83.4 --> 83-->82.5 on 10/23  - c/w Entresto 1tab BID, dapagliflozin 10mg od, ranolazine 500mg BID. metoprolol succinate 100mg Od  -spironolactone stopped because of WLIDER   - On vericiguat 2.5mg daily at home (non-formulary form sent)  - Has BiV AICD  -  EP consult placed for AICD evaluation   - pulm consult- noted: no tap  - On exam today, patient looks euvolemic ,.  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

## 2023-10-24 ENCOUNTER — TRANSCRIPTION ENCOUNTER (OUTPATIENT)
Age: 78
End: 2023-10-24

## 2023-10-24 VITALS — HEART RATE: 73 BPM | SYSTOLIC BLOOD PRESSURE: 132 MMHG | DIASTOLIC BLOOD PRESSURE: 68 MMHG

## 2023-10-24 LAB
ALBUMIN SERPL ELPH-MCNC: 3.4 G/DL — LOW (ref 3.5–5.2)
ALBUMIN SERPL ELPH-MCNC: 3.4 G/DL — LOW (ref 3.5–5.2)
ALP SERPL-CCNC: 73 U/L — SIGNIFICANT CHANGE UP (ref 30–115)
ALP SERPL-CCNC: 73 U/L — SIGNIFICANT CHANGE UP (ref 30–115)
ALT FLD-CCNC: 11 U/L — SIGNIFICANT CHANGE UP (ref 0–41)
ALT FLD-CCNC: 11 U/L — SIGNIFICANT CHANGE UP (ref 0–41)
ANION GAP SERPL CALC-SCNC: 12 MMOL/L — SIGNIFICANT CHANGE UP (ref 7–14)
ANION GAP SERPL CALC-SCNC: 12 MMOL/L — SIGNIFICANT CHANGE UP (ref 7–14)
ANION GAP SERPL CALC-SCNC: 14 MMOL/L — SIGNIFICANT CHANGE UP (ref 7–14)
ANION GAP SERPL CALC-SCNC: 14 MMOL/L — SIGNIFICANT CHANGE UP (ref 7–14)
AST SERPL-CCNC: 20 U/L — SIGNIFICANT CHANGE UP (ref 0–41)
AST SERPL-CCNC: 20 U/L — SIGNIFICANT CHANGE UP (ref 0–41)
BASOPHILS # BLD AUTO: 0.06 K/UL — SIGNIFICANT CHANGE UP (ref 0–0.2)
BASOPHILS # BLD AUTO: 0.06 K/UL — SIGNIFICANT CHANGE UP (ref 0–0.2)
BASOPHILS NFR BLD AUTO: 0.7 % — SIGNIFICANT CHANGE UP (ref 0–1)
BASOPHILS NFR BLD AUTO: 0.7 % — SIGNIFICANT CHANGE UP (ref 0–1)
BILIRUB SERPL-MCNC: 0.4 MG/DL — SIGNIFICANT CHANGE UP (ref 0.2–1.2)
BILIRUB SERPL-MCNC: 0.4 MG/DL — SIGNIFICANT CHANGE UP (ref 0.2–1.2)
BUN SERPL-MCNC: 62 MG/DL — CRITICAL HIGH (ref 10–20)
CALCIUM SERPL-MCNC: 8.7 MG/DL — SIGNIFICANT CHANGE UP (ref 8.4–10.5)
CALCIUM SERPL-MCNC: 8.7 MG/DL — SIGNIFICANT CHANGE UP (ref 8.4–10.5)
CALCIUM SERPL-MCNC: 8.9 MG/DL — SIGNIFICANT CHANGE UP (ref 8.4–10.4)
CALCIUM SERPL-MCNC: 8.9 MG/DL — SIGNIFICANT CHANGE UP (ref 8.4–10.4)
CHLORIDE SERPL-SCNC: 102 MMOL/L — SIGNIFICANT CHANGE UP (ref 98–110)
CHLORIDE SERPL-SCNC: 102 MMOL/L — SIGNIFICANT CHANGE UP (ref 98–110)
CHLORIDE SERPL-SCNC: 99 MMOL/L — SIGNIFICANT CHANGE UP (ref 98–110)
CHLORIDE SERPL-SCNC: 99 MMOL/L — SIGNIFICANT CHANGE UP (ref 98–110)
CO2 SERPL-SCNC: 24 MMOL/L — SIGNIFICANT CHANGE UP (ref 17–32)
CO2 SERPL-SCNC: 24 MMOL/L — SIGNIFICANT CHANGE UP (ref 17–32)
CO2 SERPL-SCNC: 25 MMOL/L — SIGNIFICANT CHANGE UP (ref 17–32)
CO2 SERPL-SCNC: 25 MMOL/L — SIGNIFICANT CHANGE UP (ref 17–32)
CREAT SERPL-MCNC: 2.3 MG/DL — HIGH (ref 0.7–1.5)
CREAT SERPL-MCNC: 2.3 MG/DL — HIGH (ref 0.7–1.5)
CREAT SERPL-MCNC: 2.4 MG/DL — HIGH (ref 0.7–1.5)
CREAT SERPL-MCNC: 2.4 MG/DL — HIGH (ref 0.7–1.5)
EGFR: 27 ML/MIN/1.73M2 — LOW
EGFR: 27 ML/MIN/1.73M2 — LOW
EGFR: 29 ML/MIN/1.73M2 — LOW
EGFR: 29 ML/MIN/1.73M2 — LOW
EOSINOPHIL # BLD AUTO: 0.48 K/UL — SIGNIFICANT CHANGE UP (ref 0–0.7)
EOSINOPHIL # BLD AUTO: 0.48 K/UL — SIGNIFICANT CHANGE UP (ref 0–0.7)
EOSINOPHIL NFR BLD AUTO: 5.6 % — SIGNIFICANT CHANGE UP (ref 0–8)
EOSINOPHIL NFR BLD AUTO: 5.6 % — SIGNIFICANT CHANGE UP (ref 0–8)
GLUCOSE BLDC GLUCOMTR-MCNC: 101 MG/DL — HIGH (ref 70–99)
GLUCOSE BLDC GLUCOMTR-MCNC: 101 MG/DL — HIGH (ref 70–99)
GLUCOSE BLDC GLUCOMTR-MCNC: 166 MG/DL — HIGH (ref 70–99)
GLUCOSE BLDC GLUCOMTR-MCNC: 166 MG/DL — HIGH (ref 70–99)
GLUCOSE SERPL-MCNC: 126 MG/DL — HIGH (ref 70–99)
GLUCOSE SERPL-MCNC: 126 MG/DL — HIGH (ref 70–99)
GLUCOSE SERPL-MCNC: 173 MG/DL — HIGH (ref 70–99)
GLUCOSE SERPL-MCNC: 173 MG/DL — HIGH (ref 70–99)
HCT VFR BLD CALC: 41.1 % — LOW (ref 42–52)
HCT VFR BLD CALC: 41.1 % — LOW (ref 42–52)
HGB BLD-MCNC: 12.9 G/DL — LOW (ref 14–18)
HGB BLD-MCNC: 12.9 G/DL — LOW (ref 14–18)
IMM GRANULOCYTES NFR BLD AUTO: 0.2 % — SIGNIFICANT CHANGE UP (ref 0.1–0.3)
IMM GRANULOCYTES NFR BLD AUTO: 0.2 % — SIGNIFICANT CHANGE UP (ref 0.1–0.3)
LYMPHOCYTES # BLD AUTO: 3.37 K/UL — SIGNIFICANT CHANGE UP (ref 1.2–3.4)
LYMPHOCYTES # BLD AUTO: 3.37 K/UL — SIGNIFICANT CHANGE UP (ref 1.2–3.4)
LYMPHOCYTES # BLD AUTO: 39.6 % — SIGNIFICANT CHANGE UP (ref 20.5–51.1)
LYMPHOCYTES # BLD AUTO: 39.6 % — SIGNIFICANT CHANGE UP (ref 20.5–51.1)
MCHC RBC-ENTMCNC: 27 PG — SIGNIFICANT CHANGE UP (ref 27–31)
MCHC RBC-ENTMCNC: 27 PG — SIGNIFICANT CHANGE UP (ref 27–31)
MCHC RBC-ENTMCNC: 31.4 G/DL — LOW (ref 32–37)
MCHC RBC-ENTMCNC: 31.4 G/DL — LOW (ref 32–37)
MCV RBC AUTO: 86.2 FL — SIGNIFICANT CHANGE UP (ref 80–94)
MCV RBC AUTO: 86.2 FL — SIGNIFICANT CHANGE UP (ref 80–94)
MONOCYTES # BLD AUTO: 0.83 K/UL — HIGH (ref 0.1–0.6)
MONOCYTES # BLD AUTO: 0.83 K/UL — HIGH (ref 0.1–0.6)
MONOCYTES NFR BLD AUTO: 9.8 % — HIGH (ref 1.7–9.3)
MONOCYTES NFR BLD AUTO: 9.8 % — HIGH (ref 1.7–9.3)
NEUTROPHILS # BLD AUTO: 3.74 K/UL — SIGNIFICANT CHANGE UP (ref 1.4–6.5)
NEUTROPHILS # BLD AUTO: 3.74 K/UL — SIGNIFICANT CHANGE UP (ref 1.4–6.5)
NEUTROPHILS NFR BLD AUTO: 44.1 % — SIGNIFICANT CHANGE UP (ref 42.2–75.2)
NEUTROPHILS NFR BLD AUTO: 44.1 % — SIGNIFICANT CHANGE UP (ref 42.2–75.2)
NRBC # BLD: 0 /100 WBCS — SIGNIFICANT CHANGE UP (ref 0–0)
NRBC # BLD: 0 /100 WBCS — SIGNIFICANT CHANGE UP (ref 0–0)
PLATELET # BLD AUTO: 281 K/UL — SIGNIFICANT CHANGE UP (ref 130–400)
PLATELET # BLD AUTO: 281 K/UL — SIGNIFICANT CHANGE UP (ref 130–400)
PMV BLD: 11.7 FL — HIGH (ref 7.4–10.4)
PMV BLD: 11.7 FL — HIGH (ref 7.4–10.4)
POTASSIUM SERPL-MCNC: 3.8 MMOL/L — SIGNIFICANT CHANGE UP (ref 3.5–5)
POTASSIUM SERPL-MCNC: 3.8 MMOL/L — SIGNIFICANT CHANGE UP (ref 3.5–5)
POTASSIUM SERPL-MCNC: 4.1 MMOL/L — SIGNIFICANT CHANGE UP (ref 3.5–5)
POTASSIUM SERPL-MCNC: 4.1 MMOL/L — SIGNIFICANT CHANGE UP (ref 3.5–5)
POTASSIUM SERPL-SCNC: 3.8 MMOL/L — SIGNIFICANT CHANGE UP (ref 3.5–5)
POTASSIUM SERPL-SCNC: 3.8 MMOL/L — SIGNIFICANT CHANGE UP (ref 3.5–5)
POTASSIUM SERPL-SCNC: 4.1 MMOL/L — SIGNIFICANT CHANGE UP (ref 3.5–5)
POTASSIUM SERPL-SCNC: 4.1 MMOL/L — SIGNIFICANT CHANGE UP (ref 3.5–5)
PROT SERPL-MCNC: 5.7 G/DL — LOW (ref 6–8)
PROT SERPL-MCNC: 5.7 G/DL — LOW (ref 6–8)
RBC # BLD: 4.77 M/UL — SIGNIFICANT CHANGE UP (ref 4.7–6.1)
RBC # BLD: 4.77 M/UL — SIGNIFICANT CHANGE UP (ref 4.7–6.1)
RBC # FLD: 14.1 % — SIGNIFICANT CHANGE UP (ref 11.5–14.5)
RBC # FLD: 14.1 % — SIGNIFICANT CHANGE UP (ref 11.5–14.5)
SODIUM SERPL-SCNC: 137 MMOL/L — SIGNIFICANT CHANGE UP (ref 135–146)
SODIUM SERPL-SCNC: 137 MMOL/L — SIGNIFICANT CHANGE UP (ref 135–146)
SODIUM SERPL-SCNC: 139 MMOL/L — SIGNIFICANT CHANGE UP (ref 135–146)
SODIUM SERPL-SCNC: 139 MMOL/L — SIGNIFICANT CHANGE UP (ref 135–146)
WBC # BLD: 8.5 K/UL — SIGNIFICANT CHANGE UP (ref 4.8–10.8)
WBC # BLD: 8.5 K/UL — SIGNIFICANT CHANGE UP (ref 4.8–10.8)
WBC # FLD AUTO: 8.5 K/UL — SIGNIFICANT CHANGE UP (ref 4.8–10.8)
WBC # FLD AUTO: 8.5 K/UL — SIGNIFICANT CHANGE UP (ref 4.8–10.8)

## 2023-10-24 PROCEDURE — 99239 HOSP IP/OBS DSCHRG MGMT >30: CPT

## 2023-10-24 PROCEDURE — 71045 X-RAY EXAM CHEST 1 VIEW: CPT | Mod: 26

## 2023-10-24 RX ORDER — DAPAGLIFLOZIN 10 MG/1
1 TABLET, FILM COATED ORAL
Qty: 30 | Refills: 0
Start: 2023-10-24

## 2023-10-24 RX ORDER — DAPAGLIFLOZIN 10 MG/1
1 TABLET, FILM COATED ORAL
Qty: 0 | Refills: 0 | DISCHARGE
Start: 2023-10-24

## 2023-10-24 RX ORDER — METOPROLOL TARTRATE 50 MG
1 TABLET ORAL
Qty: 30 | Refills: 2
Start: 2023-10-24

## 2023-10-24 RX ORDER — EMPAGLIFLOZIN 10 MG/1
1 TABLET, FILM COATED ORAL
Qty: 30 | Refills: 0
Start: 2023-10-24

## 2023-10-24 RX ORDER — SPIRONOLACTONE 25 MG/1
1 TABLET, FILM COATED ORAL
Qty: 30 | Refills: 2
Start: 2023-10-24

## 2023-10-24 RX ADMIN — Medication 2: at 11:53

## 2023-10-24 RX ADMIN — DAPAGLIFLOZIN 10 MILLIGRAM(S): 10 TABLET, FILM COATED ORAL at 11:53

## 2023-10-24 RX ADMIN — MYCOPHENOLATE MOFETIL 500 MILLIGRAM(S): 250 CAPSULE ORAL at 06:27

## 2023-10-24 RX ADMIN — SACUBITRIL AND VALSARTAN 1 TABLET(S): 24; 26 TABLET, FILM COATED ORAL at 06:26

## 2023-10-24 RX ADMIN — Medication 175 MICROGRAM(S): at 06:26

## 2023-10-24 RX ADMIN — Medication 145 MILLIGRAM(S): at 11:53

## 2023-10-24 RX ADMIN — PANTOPRAZOLE SODIUM 40 MILLIGRAM(S): 20 TABLET, DELAYED RELEASE ORAL at 06:26

## 2023-10-24 RX ADMIN — RANOLAZINE 500 MILLIGRAM(S): 500 TABLET, FILM COATED, EXTENDED RELEASE ORAL at 06:26

## 2023-10-24 RX ADMIN — APIXABAN 5 MILLIGRAM(S): 2.5 TABLET, FILM COATED ORAL at 06:26

## 2023-10-24 RX ADMIN — Medication 100 MILLIGRAM(S): at 06:26

## 2023-10-24 NOTE — DISCHARGE NOTE PROVIDER - NSDCFUADDINST_GEN_ALL_CORE_FT
BMP results needs to be faxed to Dr Magnus Huerta on [phone]   Please do a BMP on friday 10/27  DO NOT TAKE THE BUMETANIDE UNTIL 26/10, WHERE YOU SHOULD START TAKING 1 MG TWICE DAILY   DO NOT TAKE TAKE THE SPIRONOLACTONE UNLESS ADVISED BY YOUR CARDIOLOGIST (DR Huerta/Gretchen )     BMP results needs to be faxed to Dr Magnus Huerta/Dr berrios  on [phone]   Please do a BMP on friday 10/27 (script given)  DO NOT TAKE THE BUMETANIDE UNTIL 10/26/23. ON 10/27/23 YOU SHOULD START TAKING 1 MG TWICE DAILY   DO NOT TAKE TAKE THE SPIRONOLACTONE UNLESS ADVISED BY YOUR CARDIOLOGIST (DR Huerta/Gretchen )     BMP results needs to be faxed to Dr Magnus Huerta/Dr berrios  on [phone]   Please do a BMP on friday 10/27 (script given)  DO NOT TAKE THE BUMETANIDE UNTIL 10/26/23 . ON THIS DAY YOU SHOULD START TAKING BUMETANIDE  1 MG TWICE DAILY   DO NOT TAKE TAKE THE SPIRONOLACTONE UNLESS ADVISED BY YOUR CARDIOLOGIST (DR Huerta/Gretchen )     BMP results needs to be faxed to Dr Magnus Huerta  on [phone]  fu scheduled FOR DR PINO

## 2023-10-24 NOTE — DISCHARGE NOTE PROVIDER - NSDCMRMEDTOKEN_GEN_ALL_CORE_FT
apixaban 5 mg oral tablet: 1 tab(s) orally every 12 hours  atorvastatin 40 mg oral tablet: 1 tab(s) orally once a day  bumetanide 1 mg oral tablet: 1 tab(s) orally 2 times a day  Farxiga 10 mg oral tablet: 1 tab(s) orally every 24 hours  fenofibrate 160 mg oral tablet: 1 tab(s) orally once a day  Flomax 0.4 mg oral capsule: 1 cap(s) orally once a day  Lantus: 12 unit(s) subcutaneous once a day (at bedtime)  levothyroxine 175 mcg (0.175 mg) oral tablet: 1 tab(s) orally once a day  metoprolol tartrate 37.5 mg oral tablet: 1 tab(s) orally 2 times a day  mycophenolate mofetil 200 mg/mL oral suspension: 2.5 milliliter(s) orally 2 times a day  omeprazole 40 mg oral delayed release capsule: 1 cap(s) orally once a day  Ranexa 500 mg oral tablet, extended release: 1 tab(s) orally 2 times a day  sacubitril-valsartan 49 mg-51 mg oral tablet: 1 tab(s) orally 2 times a day  Verquvo 2.5 mg oral tablet: 1 tab(s) orally once a day   apixaban 5 mg oral tablet: 1 tab(s) orally every 12 hours  atorvastatin 40 mg oral tablet: 1 tab(s) orally once a day  bumetanide 1 mg oral tablet: 1 tab(s) orally 2 times a day  fenofibrate 160 mg oral tablet: 1 tab(s) orally once a day  Flomax 0.4 mg oral capsule: 1 cap(s) orally once a day  Lantus: 12 unit(s) subcutaneous once a day (at bedtime)  levothyroxine 175 mcg (0.175 mg) oral tablet: 1 tab(s) orally once a day  metoprolol succinate 100 mg oral tablet, extended release: 1 tab(s) orally once a day  mycophenolate mofetil 200 mg/mL oral suspension: 2.5 milliliter(s) orally 2 times a day  omeprazole 40 mg oral delayed release capsule: 1 cap(s) orally once a day  Ranexa 500 mg oral tablet, extended release: 1 tab(s) orally 2 times a day  sacubitril-valsartan 49 mg-51 mg oral tablet: 1 tab(s) orally 2 times a day  spironolactone 25 mg oral tablet: 1 tab(s) orally once a day  Verquvo 2.5 mg oral tablet: 1 tab(s) orally once a day

## 2023-10-24 NOTE — DISCHARGE NOTE PROVIDER - CARE PROVIDER_API CALL
WARREN SALGADO  Phone: [phone]  Fax: [phone]  Follow Up Time: 1-3 days   WARREN SALGADO  Phone: [phone]  Fax: [phone]  Follow Up Time: 1 week    Geoff Godinez  Cardiology  [address] [place] [address]  Phone: [phone]  Fax: [phone]  Established Patient  Follow Up Time: 1-3 days   WARREN SALGADO  Phone: [phone]  Fax: [phone]  Follow Up Time: 1 week    Geoff Godinez  Cardiology  [address] [place] [address]  Phone: [phone]  Fax: [phone]  Established Patient  Follow Up Time: 1 week

## 2023-10-24 NOTE — DISCHARGE NOTE PROVIDER - ATTENDING DISCHARGE PHYSICAL EXAMINATION:
Orthopedic Patient with clean lungs and no pitting edema of the extremities.     On review of prior hospitalization in 3/2023, patient was diuresed with Bumex 2 mg IV BID until his Cr increased to 2.3 on 3/14, at which point his Bumex was held. The following day, Cr remained 2.3 and patient was given 250 cc of NS. Cr remained 2.3 on 3/16, before downtrending to 1.8 on 3/17. Patient was discharged on 3/17 on Bumex 1 mg BID.     Given the patient's kidney function has followed the same trend during this hospitalization, plan to discharge off diuretics for a total of 3 days (starting 10/23) and patient can resume Bumex 1 mg BID on 10/26/23. He was started on spironolactone but this was discontinued due to OVI, may be trailed on this medication again in the future. He was also started on Farxiga, but could not be discharged with this medication as the copay was too expensive.     Patient will have lab work done on Friday 10/27, which will be faxed to Dr. Huerta for further medication adjustments.

## 2023-10-24 NOTE — DISCHARGE NOTE PROVIDER - PROVIDER TOKENS
PROVIDER:[TOKEN:[[id number]],FOLLOWUP:[1-3 days]] PROVIDER:[TOKEN:[[id number]],FOLLOWUP:[1 week]],PROVIDER:[TOKEN:[[id number]],FOLLOWUP:[1-3 days],ESTABLISHEDPATIENT:[T]] PROVIDER:[TOKEN:[[id number]],FOLLOWUP:[1 week]],PROVIDER:[TOKEN:[[id number]],FOLLOWUP:[1 week],ESTABLISHEDPATIENT:[T]]

## 2023-10-24 NOTE — DISCHARGE NOTE PROVIDER - NSDCFUSCHEDAPPT_GEN_ALL_CORE_FT
[facility] Physician [facility]  CARDIOLOGY [address]  Scheduled Appointment: 10/27/2023    Liborio Madrigal  [facility] Physician [facility]  PULMMED [address]  Scheduled Appointment: 11/01/2023

## 2023-10-24 NOTE — DISCHARGE NOTE PROVIDER - NSDCCPCAREPLAN_GEN_ALL_CORE_FT
PRINCIPAL DISCHARGE DIAGNOSIS  Diagnosis: CHF exacerbation  Assessment and Plan of Treatment: you have been diagnosed with a congestive heart failure exacerbation .     PRINCIPAL DISCHARGE DIAGNOSIS  Diagnosis: CHF exacerbation  Assessment and Plan of Treatment: you have been diagnosed with a congestive heart failure exacerbation .  Heart failure is a condition in which the heart is no longer able to pump oxygen-rich blood to the rest of the body efficiently. When symptoms become severe, a hospital stay may be necessary.  Weigh yourself every morning on the same scale when you get up -- before you eat but after you use the bathroom.  You will need to eat less salt. Salt can make you thirsty, and being thirsty can cause you to drink too much fluid. Extra salt also makes fluid stay in your body. Lots of foods that do not taste salty, or that you do not add salt to, still contain a lot of salt.  You may need to take a diuretic, or water pill.  Do not drink alcohol. Alcohol makes it harder for your heart muscles to work. Ask your provider what to do on special occasions where alcohol and foods you are trying to avoid will be served.  Learn more about what you should eat to make your heart and blood vessels healthier.  Avoid fatty foods.  Stay away from fast-food restaurants.  Avoid some prepared and frozen foods.  Learn fast food tips.

## 2023-10-24 NOTE — DISCHARGE NOTE PROVIDER - HOSPITAL COURSE
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  upon presentation patient was found to be in overload chest xray showed New right basilar opacity/effusion. Left basilar opacity/atelectasis.   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  upon presentation patient was found to be in overload chest xray showed New right basilar opacity/effusion. Left basilar opacity/atelectasis.   , seen by pulmonology advised on diuresis and  to do a sleep study as out patient   patient was started on diuresis , he  was on bumetanide 2 mg IV BID with gradual improvement of his overload , spironolactone was added for HF  medication optimization   patients creatinine went up to 2.4 on 10/23 and his diuretics including spironolactone were stopped and a 250 cc fluid bolus was given . Creatinine stable prior to DC   TTE done this admission  :   1. Severely decreased global left ventricular systolic function.   2. Multiple left ventricular regional wall motion abnormalities exist.   See wall motion findings.   3. LV Ejection Fraction by German's Methodwith a biplane EF of 27 %.    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  upon presentation patient was found to be in overload chest xray showed New right basilar opacity/effusion. Left basilar opacity/atelectasis.   , seen by pulmonology advised on diuresis and  to do a sleep study as out patient   patient was started on diuresis , he  was on bumetanide 2 mg IV BID with gradual improvement of his overload , spironolactone was added for HF  medication optimization   patients creatinine went up to 2.4 on 10/23 and his diuretics including spironolactone were stopped and a 250 cc fluid bolus was given . Creatinine stable prior to DC   TTE done this admission: EF of 27%; severely decreased global left ventricular systolic function. 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  upon presentation patient was found to be in overload chest xray showed New right basilar opacity/effusion. Left basilar opacity/atelectasis.  Patient was admitted with a  primary diagnosis of HF exacerbation and fluid overload , he was given a stat dose of bumetanide and started on 2 mg BID IV .  home medication of enteresto , vericiguat , fibrates , statin and synthroid where continued on admission and throughout hospital stay   For the pleural effusion ( right sided) , patient was seen by pulmonology who advised on diuresis and on  doing sleep studies as out patient .  Patient was also seen by HF team advised on metoprolol  mg daily , spironolactone 25 mg daily was added for HF medication optimisation .   while patient was on diuretics he had an elevation in his creatinine until it peaked with a value of 2.4 on 10/24 , on this day bumetanide was stopped , spironolactone was also stopped .   he was given a 250 cc bolus , encourage oral intake of fluid .  Creatinine on discharge day was stable   patient had a bladder scan to ensure he  is not retaining , it showed only 85 cc of fluid in the bladder   patient was supposed to be discharged on dapagliflozine 10 mg daily , but co-pay is too high   TTE done this admission: EF of 27%; severely decreased global left ventricular systolic function. 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 Patient has worsening shortness of breath x3 weeks, aggravated by lying supine, improved with sitting up.  Associated with bilateral lower extremity swelling. Denied fever, headache, chest pain, abdominal pain, weakness, numbness, dysuria, hematuria, vomiting, diarrhea.    In ED, vitals were stable. Labs showed- Trops 0.02 (elevated at baseline), Cr 1.6 (bl 1.8), BNP 8598, K+ 5.4 (hemolyzed) but 7.0 on VBG  EKG showed sinus rhythm with PVCs  Patient was found to be in overload chest xray showed New right basilar opacity/effusion. Left basilar opacity/atelectasis.  Pt. was Initially admitted under medicine but immediately transferred to cardio stepdown with a  primary diagnosis of HF exacerbation and fluid overload.    Pt. was given a stat dose of bumetanide and started on 2 mg BID IV.  ECHO showed EF of 27%; severely decreased global left ventricular systolic function.  Patient was seen by pulmonology for the right sided pleural effusion, who advised on diuresis. Pt. advisedto follow up Pulm(Dr. Srivastava) outpatient for sleep study.  Patient was also seen by HF team advised on metoprolol  mg daily , spironolactone 25 mg daily was added for HF medication optimization.  Hospital course was complicated by OVI likely due to overdiuresis- He had an elevation in his creatinine until it peaked with a value of 2.4 on 10/24 , on this day bumetanide was stopped, spironolactone was also stopped.  Creatinine improved after gentle IV and oral hydration. Creatinine is now stable and back to baseline. Urinary retention ruled out by bladder scan.  Home medication of Enteresto , Vericiguat , Fibrates , Statin and synthroid where continued on admission and throughout hospital stay.  Patient was supposed to be discharged on Dapagliflozin 10 mg daily, but co-pay is too high.  Device interrogation completed by ED on 10/23/23- it was functioning fine and no events were noted.    Patient is feeling better and is medically stable to be discharged home with home care today. 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 Patient has worsening shortness of breath x3 weeks, aggravated by lying supine, improved with sitting up.  Associated with bilateral lower extremity swelling. Denied fever, headache, chest pain, abdominal pain, weakness, numbness, dysuria, hematuria, vomiting, diarrhea.    In ED, vitals were stable. Labs showed- Trops 0.02 (elevated at baseline), Cr 1.6 (bl 1.8), BNP 8598, K+ 5.4 (hemolyzed) but 7.0 on VBG  EKG showed sinus rhythm with PVCs  Patient was found to be in overload chest xray showed New right basilar opacity/effusion. Left basilar opacity/atelectasis.  Pt. was Initially admitted under medicine but immediately transferred to cardio stepdown with a  primary diagnosis of HF exacerbation and fluid overload.    Pt. was given a stat dose of bumetanide and started on 2 mg BID IV.  ECHO showed EF of 27%; severely decreased global left ventricular systolic function.  Patient was seen by pulmonology for the right sided pleural effusion, who advised on diuresis. Pt. advisedto follow up Pulm(Dr. Srivastava) outpatient for sleep study.  Patient was also seen by HF team advised on metoprolol  mg daily , spironolactone 25 mg daily was added for HF medication optimization.  Hospital course was complicated by OVI likely due to overdiuresis- He had an elevation in his creatinine until it peaked with a value of 2.4 on 10/24 , on this day bumetanide was stopped, spironolactone was also stopped.  Creatinine improved after gentle IV and oral hydration. Creatinine is now stable and back to baseline. Urinary retention ruled out by bladder scan.  Home medication of Enteresto , Vericiguat , Fibrates , Statin synthroid were continued on admission and throughout hospital stay.  Patient was supposed to be discharged on Dapagliflozin 10 mg daily, but co-pay is too high.  Device interrogation completed by ED on 10/23/23- it was functioning fine and no events were noted.    Patient is feeling better and is medically stable to be discharged home with home care today.

## 2023-10-27 ENCOUNTER — APPOINTMENT (OUTPATIENT)
Dept: CARDIOLOGY | Facility: CLINIC | Age: 78
End: 2023-10-27
Payer: MEDICARE

## 2023-10-27 ENCOUNTER — NON-APPOINTMENT (OUTPATIENT)
Age: 78
End: 2023-10-27

## 2023-10-27 PROCEDURE — 93295 DEV INTERROG REMOTE 1/2/MLT: CPT

## 2023-10-27 PROCEDURE — 93296 REM INTERROG EVL PM/IDS: CPT

## 2023-11-07 ENCOUNTER — APPOINTMENT (OUTPATIENT)
Dept: PULMONOLOGY | Facility: CLINIC | Age: 78
End: 2023-11-07
Payer: MEDICARE

## 2023-11-07 VITALS
OXYGEN SATURATION: 97 % | HEIGHT: 66 IN | HEART RATE: 95 BPM | WEIGHT: 185 LBS | BODY MASS INDEX: 29.73 KG/M2 | SYSTOLIC BLOOD PRESSURE: 110 MMHG | DIASTOLIC BLOOD PRESSURE: 70 MMHG

## 2023-11-07 DIAGNOSIS — E87.5 HYPERKALEMIA: ICD-10-CM

## 2023-11-07 DIAGNOSIS — T50.2X5A ADVERSE EFFECT OF CARBONIC-ANHYDRASE INHIBITORS, BENZOTHIADIAZIDES AND OTHER DIURETICS, INITIAL ENCOUNTER: ICD-10-CM

## 2023-11-07 DIAGNOSIS — Z79.890 HORMONE REPLACEMENT THERAPY: ICD-10-CM

## 2023-11-07 DIAGNOSIS — Z79.4 LONG TERM (CURRENT) USE OF INSULIN: ICD-10-CM

## 2023-11-07 DIAGNOSIS — Z99.81 DEPENDENCE ON SUPPLEMENTAL OXYGEN: ICD-10-CM

## 2023-11-07 DIAGNOSIS — E78.00 PURE HYPERCHOLESTEROLEMIA, UNSPECIFIED: ICD-10-CM

## 2023-11-07 DIAGNOSIS — E03.9 HYPOTHYROIDISM, UNSPECIFIED: ICD-10-CM

## 2023-11-07 DIAGNOSIS — I47.20 VENTRICULAR TACHYCARDIA, UNSPECIFIED: ICD-10-CM

## 2023-11-07 DIAGNOSIS — I11.0 HYPERTENSIVE HEART DISEASE WITH HEART FAILURE: ICD-10-CM

## 2023-11-07 DIAGNOSIS — E11.9 TYPE 2 DIABETES MELLITUS WITHOUT COMPLICATIONS: ICD-10-CM

## 2023-11-07 DIAGNOSIS — Z87.891 PERSONAL HISTORY OF NICOTINE DEPENDENCE: ICD-10-CM

## 2023-11-07 DIAGNOSIS — Z95.810 PRESENCE OF AUTOMATIC (IMPLANTABLE) CARDIAC DEFIBRILLATOR: ICD-10-CM

## 2023-11-07 DIAGNOSIS — Z79.82 LONG TERM (CURRENT) USE OF ASPIRIN: ICD-10-CM

## 2023-11-07 DIAGNOSIS — I25.5 ISCHEMIC CARDIOMYOPATHY: ICD-10-CM

## 2023-11-07 DIAGNOSIS — N17.9 ACUTE KIDNEY FAILURE, UNSPECIFIED: ICD-10-CM

## 2023-11-07 DIAGNOSIS — Z79.899 OTHER LONG TERM (CURRENT) DRUG THERAPY: ICD-10-CM

## 2023-11-07 DIAGNOSIS — Z91.041 RADIOGRAPHIC DYE ALLERGY STATUS: ICD-10-CM

## 2023-11-07 DIAGNOSIS — Z95.1 PRESENCE OF AORTOCORONARY BYPASS GRAFT: ICD-10-CM

## 2023-11-07 DIAGNOSIS — I50.23 ACUTE ON CHRONIC SYSTOLIC (CONGESTIVE) HEART FAILURE: ICD-10-CM

## 2023-11-07 DIAGNOSIS — Z79.01 LONG TERM (CURRENT) USE OF ANTICOAGULANTS: ICD-10-CM

## 2023-11-07 DIAGNOSIS — I25.10 ATHEROSCLEROTIC HEART DISEASE OF NATIVE CORONARY ARTERY WITHOUT ANGINA PECTORIS: ICD-10-CM

## 2023-11-07 DIAGNOSIS — I48.20 CHRONIC ATRIAL FIBRILLATION, UNSPECIFIED: ICD-10-CM

## 2023-11-07 DIAGNOSIS — L10.9 PEMPHIGUS, UNSPECIFIED: ICD-10-CM

## 2023-11-07 DIAGNOSIS — J98.11 ATELECTASIS: ICD-10-CM

## 2023-11-07 PROCEDURE — 71046 X-RAY EXAM CHEST 2 VIEWS: CPT

## 2023-11-07 PROCEDURE — 99213 OFFICE O/P EST LOW 20 MIN: CPT | Mod: 25

## 2023-12-12 NOTE — DISCHARGE NOTE NURSING/CASE MANAGEMENT/SOCIAL WORK - NSPROMEDSBROUGHTTOHOSP_GEN_A_NUR
Patient ambulatory with a  steady gait. Patient walked to the bathroom with the assistance of mom. Patient was able to walk there and back.    no

## 2024-01-26 ENCOUNTER — NON-APPOINTMENT (OUTPATIENT)
Age: 79
End: 2024-01-26

## 2024-01-26 ENCOUNTER — APPOINTMENT (OUTPATIENT)
Dept: CARDIOLOGY | Facility: CLINIC | Age: 79
End: 2024-01-26
Payer: MEDICARE

## 2024-01-26 PROCEDURE — 93295 DEV INTERROG REMOTE 1/2/MLT: CPT

## 2024-01-26 PROCEDURE — 93296 REM INTERROG EVL PM/IDS: CPT

## 2024-01-30 ENCOUNTER — APPOINTMENT (OUTPATIENT)
Dept: PULMONOLOGY | Facility: CLINIC | Age: 79
End: 2024-01-30
Payer: MEDICARE

## 2024-01-30 VITALS
BODY MASS INDEX: 29.25 KG/M2 | WEIGHT: 182 LBS | HEIGHT: 66 IN | DIASTOLIC BLOOD PRESSURE: 60 MMHG | OXYGEN SATURATION: 95 % | HEART RATE: 100 BPM | SYSTOLIC BLOOD PRESSURE: 118 MMHG

## 2024-01-30 DIAGNOSIS — J90 PLEURAL EFFUSION, NOT ELSEWHERE CLASSIFIED: ICD-10-CM

## 2024-01-30 DIAGNOSIS — Z87.891 PERSONAL HISTORY OF NICOTINE DEPENDENCE: ICD-10-CM

## 2024-01-30 DIAGNOSIS — R06.02 SHORTNESS OF BREATH: ICD-10-CM

## 2024-01-30 PROCEDURE — 99214 OFFICE O/P EST MOD 30 MIN: CPT

## 2024-01-30 RX ORDER — ALBUTEROL SULFATE 2.5 MG/3ML
(2.5 MG/3ML) SOLUTION RESPIRATORY (INHALATION) EVERY 8 HOURS
Qty: 1 | Refills: 3 | Status: ACTIVE | COMMUNITY
Start: 2024-01-30 | End: 1900-01-01

## 2024-01-30 NOTE — PHYSICAL EXAM
[No Acute Distress] : no acute distress [Normal Oropharynx] : normal oropharynx [Normal Appearance] : normal appearance [No Neck Mass] : no neck mass [Normal Rate/Rhythm] : normal rate/rhythm [Normal S1, S2] : normal s1, s2 [No Murmurs] : no murmurs [No Resp Distress] : no resp distress [Clear to Auscultation Bilaterally] : clear to auscultation bilaterally [No Abnormalities] : no abnormalities [Benign] : benign [Normal Gait] : normal gait [No Clubbing] : no clubbing [No Cyanosis] : no cyanosis [FROM] : FROM [Normal Color/ Pigmentation] : normal color/ pigmentation [No Focal Deficits] : no focal deficits [Oriented x3] : oriented x3 [Normal Affect] : normal affect [TextBox_68] : No rales  [TextBox_105] : Less edema, down 10 lbs

## 2024-01-30 NOTE — REVIEW OF SYSTEMS
[Dyspnea] : dyspnea [Wheezing] : wheezing [SOB on Exertion] : sob on exertion [Orthopnea] : orthopnea [Negative] : Endocrine

## 2024-01-30 NOTE — HISTORY OF PRESENT ILLNESS
[TextBox_4] : 77-year-old man with history of heart failure, AICD placement, on oral diuretics twice daily, follows regularly with his cardiologist, recent admission to the hospital with CHF exacerbation, pulmonary edema, pleural effusion status postthoracentesis yielding transudative fluid, discharged on home oxygen and most recently his O2 saturation on room air has been adequate, presenting for follow-up.  On exam today he still has edema in the lower extremities, lungs are clear.  He states that he has gained weight, and feels edematous.  He does have occasional wheezing.  He is a former smoker but quit a long time ago.  1/30/24: Shortness of breath is essentially from advanced HFREF. No effusion on recent CXR. Lungs clear on exam today. Occasional wheezing noted. Nebs ordered. On NC on and off. Following regulargly with cardiology.

## 2024-01-30 NOTE — ASSESSMENT
[FreeTextEntry1] : Shortness of breath  No recurrence of pleural effusion  Advanced HFREF  CXR with mild congestion  S/P BIV AICD - follows with EP Following with Cardiology Dr Godinez as well  Will re-order nebs again  Continue O2 to keep spo2 more than 92% 3 months follow up

## 2024-04-14 NOTE — PATIENT PROFILE ADULT - FALL HARM RISK - FACTORS
"Occupational Therapy   Treatment    Name: Florence Faria  MRN: [id number]  Admitting Diagnosis:  Closed fracture of one rib of left side       Recommendations:     Discharge Recommendations: Moderate Intensity Therapy  Discharge Equipment Recommendations:   (TBD)  Barriers to discharge:  Decreased caregiver support (Current functional level)    Assessment:     Florence Faria is a 76 y.o. female with a medical diagnosis of Closed fracture of one rib of left side.  She presents with the following performance deficits affecting function are weakness, impaired endurance, impaired sensation, impaired self care skills, impaired functional mobility, gait instability, impaired balance, decreased upper extremity function, decreased lower extremity function, decreased safety awareness, pain, decreased ROM, impaired coordination, impaired fine motor, impaired skin.     Able to assess EOB/OOB activity this date. Pt unable to complete stand pivot bed>w/c, even with assist of two, 2/2 pain and pt requiring Setup-Mod A for assessed ADL as detailed below. Pt with difficulty using L UE in tasks 2/2 contracture.   Discharge rec: moderate intensity therapy  DME rec: TBD at next level of care - pt has ivon walker, BSC, tub bench, w/c, and grab bars    Rehab Prognosis:  Fair; patient would benefit from acute skilled OT services to address these deficits and reach maximum level of function.       Plan:     Patient to be seen 5 x/week to address the above listed problems via self-care/home management, therapeutic activities, therapeutic exercises, neuromuscular re-education  Plan of Care Expires: 05/13/24  Plan of Care Reviewed with: patient    Subjective     Chief Complaint: L sided pain  Patient/Family Comments/goals: "All I can do is try"  Pain/Comfort:  Pain Rating 1: 8/10  Location - Side 1: Left  Location 1: rib(s)  Pain Addressed 1: Reposition, Distraction, Cessation of Activity  Pain Rating Post-Intervention 1:  (Pt " appearting comfortable at rest)    Objective:     Communicated with: DONNA Dewitt prior to session.  Patient found right sidelying with peripheral IV, PureWick, telemetry upon OT entry to room.    General Precautions: Standard, fall    Orthopedic Precautions:N/A  Braces: N/A  Respiratory Status: Room air     Occupational Performance:     Bed Mobility:    R sidelying to sit EOB: Min A x 2  Sit to sup: Mod of 1 at LE, Min of 1 at trunk  Scoot posteriorly sitting EOB with Mod A  Pt able to partically scoot to HOB in supine with SBA and R rail; requiring Max x 2 to fully scoot to HOB    Functional Mobility/Transfers:  Min of 1, CGA of 1 sit<>stand from bed x 2 trials; pt attempting to pivot bed>w/c but unable to advance L LE and needing to sit 2/2 L rib pain    Activities of Daily Living:  Grooming: Setup to wash face bed level    Upper Body Dressing: moderate assistance change soiled gown sitting EOB; pt instructed on ivon technique, difficulty pulling of L UE 2/2 contracture and needing CGA for sitting balance without support of R UE on bed, pt able to partially don on R UE but needing assist pulling to shoulder, assist tying in back      AMPAC 6 Click ADL: 14    Treatment & Education:  -Education on role of OT, POC, discharge recs, safety with EOB/OOB activity, use of call light  -ADL/mobility as detailed above    Patient left right sidelying with all lines intact, call button in reach, and bed alarm on    GOALS:   Multidisciplinary Problems       Occupational Therapy Goals          Problem: Occupational Therapy    Goal Priority Disciplines Outcome Interventions   Occupational Therapy Goal     OT, PT/OT Ongoing, Progressing    Description: OT goals to be met by 4/27/24  1. Bed mobility needed for participation in EOB/OOB ADL with SBA  2. UB dressing, ivon technique, with Setup/SPV  3. LB dressing, adaptive technique with Min A  4. Functional transfers with LRAD and Min A                       Time Tracking:     OT Date  of Treatment: 04/14/24  OT Start Time: 1057  OT Stop Time: 1118  OT Total Time (min): 21 min    Billable Minutes:Self Care/Home Management 21    OT/JONATHON: OT        Overlap with PT for portions of session due to complex nature of pt and need for increased safety.  Two skilled therapists needed during functional mobility to decrease patient fall risk and decrease risk of caregiver injury.      4/14/2024   Weakness

## 2024-04-26 ENCOUNTER — APPOINTMENT (OUTPATIENT)
Dept: ELECTROPHYSIOLOGY | Facility: CLINIC | Age: 79
End: 2024-04-26
Payer: MEDICARE

## 2024-04-26 VITALS
TEMPERATURE: 97.8 F | RESPIRATION RATE: 17 BRPM | WEIGHT: 180 LBS | BODY MASS INDEX: 28.93 KG/M2 | HEART RATE: 74 BPM | DIASTOLIC BLOOD PRESSURE: 71 MMHG | SYSTOLIC BLOOD PRESSURE: 123 MMHG | HEIGHT: 66 IN

## 2024-04-26 DIAGNOSIS — I48.91 UNSPECIFIED ATRIAL FIBRILLATION: ICD-10-CM

## 2024-04-26 DIAGNOSIS — Z45.02 ENCOUNTER FOR ADJUSTMENT AND MANAGEMENT OF AUTOMATIC IMPLANTABLE CARDIAC DEFIBRILLATOR: ICD-10-CM

## 2024-04-26 DIAGNOSIS — I10 ESSENTIAL (PRIMARY) HYPERTENSION: ICD-10-CM

## 2024-04-26 DIAGNOSIS — I50.22 CHRONIC SYSTOLIC (CONGESTIVE) HEART FAILURE: ICD-10-CM

## 2024-04-26 DIAGNOSIS — R06.02 SHORTNESS OF BREATH: ICD-10-CM

## 2024-04-26 PROCEDURE — 93284 PRGRMG EVAL IMPLANTABLE DFB: CPT

## 2024-04-26 PROCEDURE — 99214 OFFICE O/P EST MOD 30 MIN: CPT

## 2024-04-26 RX ORDER — ALBUTEROL SULFATE 2.5 MG/3ML
(2.5 MG/3ML) SOLUTION RESPIRATORY (INHALATION) EVERY 8 HOURS
Qty: 1 | Refills: 3 | Status: COMPLETED | COMMUNITY
Start: 2023-06-19 | End: 2024-04-26

## 2024-04-26 RX ORDER — ALBUTEROL SULFATE 90 UG/1
108 (90 BASE) INHALANT RESPIRATORY (INHALATION)
Qty: 1 | Refills: 3 | Status: COMPLETED | COMMUNITY
Start: 2023-04-04 | End: 2024-04-26

## 2024-04-29 PROBLEM — Z45.02 ENCOUNTER FOR INTERROGATION OF CARDIAC DEFIBRILLATOR: Status: ACTIVE | Noted: 2019-10-24

## 2024-04-29 PROBLEM — R06.02 SOB (SHORTNESS OF BREATH) ON EXERTION: Status: ACTIVE | Noted: 2023-07-28

## 2024-04-29 RX ORDER — SACUBITRIL AND VALSARTAN 49; 51 MG/1; MG/1
49-51 TABLET, FILM COATED ORAL TWICE DAILY
Refills: 0 | Status: ACTIVE | COMMUNITY

## 2024-04-29 RX ORDER — APIXABAN 5 MG/1
5 TABLET, FILM COATED ORAL
Qty: 60 | Refills: 3 | Status: ACTIVE | COMMUNITY

## 2024-04-29 RX ORDER — BUMETANIDE 1 MG/1
1 TABLET ORAL DAILY
Qty: 90 | Refills: 2 | Status: ACTIVE | COMMUNITY

## 2024-04-29 RX ORDER — VERICIGUAT 2.5 MG/1
2.5 TABLET, FILM COATED ORAL
Refills: 0 | Status: ACTIVE | COMMUNITY

## 2024-04-29 RX ORDER — LEVOTHYROXINE SODIUM 0.17 MG/1
175 TABLET ORAL DAILY
Refills: 0 | Status: ACTIVE | COMMUNITY
Start: 2022-06-02

## 2024-04-29 RX ORDER — INSULIN DETEMIR 100 [IU]/ML
100 INJECTION, SOLUTION SUBCUTANEOUS DAILY
Refills: 0 | Status: ACTIVE | COMMUNITY

## 2024-04-29 RX ORDER — DAPAGLIFLOZIN 10 MG/1
10 TABLET, FILM COATED ORAL DAILY
Qty: 90 | Refills: 3 | Status: ACTIVE | COMMUNITY

## 2024-04-29 RX ORDER — FENOFIBRATE 160 MG/1
160 TABLET ORAL DAILY
Refills: 0 | Status: ACTIVE | COMMUNITY

## 2024-04-29 RX ORDER — METOPROLOL SUCCINATE 50 MG/1
50 TABLET, EXTENDED RELEASE ORAL DAILY
Qty: 30 | Refills: 6 | Status: ACTIVE | COMMUNITY

## 2024-04-29 NOTE — HISTORY OF PRESENT ILLNESS
[de-identified] : Cardiologist: Dr. 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   The patient was hospitalized for CHF exacerbation on 10/17/2023.  He complains of chronic dyspnea but denies chest pain/discomfort, palpitations, dizziness, lightheadedness and syncope.

## 2024-04-29 NOTE — REVIEW OF SYSTEMS
[SOB] : shortness of breath [Dyspnea on exertion] : dyspnea during exertion [Negative] : Heme/Lymph [Chest Discomfort] : no chest discomfort [Palpitations] : no palpitations [Syncope] : no syncope

## 2024-04-29 NOTE — CARDIOLOGY SUMMARY
[de-identified] : TTE 01/06/2022: LVEF 27%, moderate LAE, no significant valvular disease. [de-identified] : MPI 2021: LVEF 26%. Large fixed mid anterior, mid anterolateral, anteroseptal and apex defect [de-identified] : [facility] BiV ICD 06/02/2016.

## 2024-04-29 NOTE — ASSESSMENT
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   The patient was hospitalized for CHF exacerbation on 10/17/2023.  -BiV ICD interrogation Normal device function. AP: 1.7% and BP: 96% AT/AF burden <1%. The patient is enrolled on remote monitoring.  -Chronic systolic heart failure   Continue Entresto 49-51mg twice a day, Bumetanide 1 mg daily, Verquvo 2.5 mg dialy and Metoprolol succinate 50 mg daily.  -Atrial fibrillation-on Eliquis 5 mg twice a day. CHADSVASCs of 6. No s/s of bleeding.  Labs from 02/08/2024 reviewed: H/H 12.5/40.3 and Cr. 1.90. 6 episodes of A fib on device interrogation rate controlled.  -HTN-well controlled. Continue Metoprolol succinate 50 mg daily and Entresto 49-51 mg twice a day.  -Follow-up in 6 months or prn,   I have also advised the patient to go to the nearest emergency room if she experiences any chest pain, dyspnea, syncope, or has any other compelling symptoms.

## 2024-04-29 NOTE — PHYSICAL EXAM
[General Appearance - In No Acute Distress] : no acute distress [Heart Rate And Rhythm] : heart rate and rhythm were normal [Heart Sounds] : normal S1 and S2 [] : no respiratory distress [Respiration, Rhythm And Depth] : normal respiratory rhythm and effort [Exaggerated Use Of Accessory Muscles For Inspiration] : no accessory muscle use [Left Infraclavicular] : left infraclavicular area [Clean] : clean [Dry] : dry [Well-Healed] : well-healed

## 2024-04-29 NOTE — PROCEDURE
[No] : not [NSR] : normal sinus rhythm [See Scanned Paceart Report] : See scanned paceart report [CRT-D] : Cardiac resynchronization therapy defibrillator [See Device Printout] : See device printout [DDD] : DDD [Impedance: ___ Ohms] : current cell impedance is [unfilled] Ohms [Charge Time: ___ sec] : charge time was [unfilled] seconds [Longevity: ___ months] : The estimated remaining battery life is [unfilled] months [Normal] : The battery status is normal. [Threshold Testing Performed] : Threshold testing was performed [Atrial] : Atrial [Ventricular] : Ventricular [Sensing Amplitude ___mv] : sensing amplitude was [unfilled] mv [Lead Imp:  ___ohms] : lead impedance was [unfilled] ohms [___V @] : [unfilled] V [___ ms] : [unfilled] ms [Programmed for Longevity] : output reprogrammed for improved battery longevity [Asense-Vsense ___ %] : Asense-Vsense [unfilled]% [de-identified] : ABBOTT / FAUSTINO [de-identified] : [id number] [de-identified] : [id number] [de-identified] : 06/02/2016 [de-identified] :  bpm [de-identified] : 11.9 months [de-identified] : Normal device function. AP: 1.7% and BP: 96% AT/AF burden <1%. The patient is enrolled on remote monitoring.

## 2024-04-30 ENCOUNTER — APPOINTMENT (OUTPATIENT)
Dept: PULMONOLOGY | Facility: CLINIC | Age: 79
End: 2024-04-30

## 2024-07-26 ENCOUNTER — NON-APPOINTMENT (OUTPATIENT)
Age: 79
End: 2024-07-26

## 2024-07-26 ENCOUNTER — APPOINTMENT (OUTPATIENT)
Dept: CARDIOLOGY | Facility: CLINIC | Age: 79
End: 2024-07-26
Payer: MEDICARE

## 2024-07-26 PROCEDURE — 93295 DEV INTERROG REMOTE 1/2/MLT: CPT

## 2024-07-26 PROCEDURE — 93296 REM INTERROG EVL PM/IDS: CPT

## 2024-10-25 ENCOUNTER — APPOINTMENT (OUTPATIENT)
Dept: CARDIOLOGY | Facility: CLINIC | Age: 79
End: 2024-10-25

## 2024-10-25 PROCEDURE — 93296 REM INTERROG EVL PM/IDS: CPT

## 2024-10-25 PROCEDURE — 93295 DEV INTERROG REMOTE 1/2/MLT: CPT

## 2024-11-22 ENCOUNTER — NON-APPOINTMENT (OUTPATIENT)
Age: 79
End: 2024-11-22

## 2024-11-22 ENCOUNTER — APPOINTMENT (OUTPATIENT)
Dept: ELECTROPHYSIOLOGY | Facility: CLINIC | Age: 79
End: 2024-11-22
Payer: MEDICARE

## 2024-11-22 VITALS
WEIGHT: 182 LBS | TEMPERATURE: 97.1 F | HEART RATE: 67 BPM | HEIGHT: 67 IN | SYSTOLIC BLOOD PRESSURE: 111 MMHG | DIASTOLIC BLOOD PRESSURE: 73 MMHG | BODY MASS INDEX: 28.56 KG/M2

## 2024-11-22 DIAGNOSIS — I48.91 UNSPECIFIED ATRIAL FIBRILLATION: ICD-10-CM

## 2024-11-22 DIAGNOSIS — I50.22 CHRONIC SYSTOLIC (CONGESTIVE) HEART FAILURE: ICD-10-CM

## 2024-11-22 DIAGNOSIS — I10 ESSENTIAL (PRIMARY) HYPERTENSION: ICD-10-CM

## 2024-11-22 DIAGNOSIS — Z45.02 ENCOUNTER FOR ADJUSTMENT AND MANAGEMENT OF AUTOMATIC IMPLANTABLE CARDIAC DEFIBRILLATOR: ICD-10-CM

## 2024-11-22 PROCEDURE — 93284 PRGRMG EVAL IMPLANTABLE DFB: CPT

## 2024-11-22 PROCEDURE — 99214 OFFICE O/P EST MOD 30 MIN: CPT

## 2024-11-22 PROCEDURE — 93000 ELECTROCARDIOGRAM COMPLETE: CPT | Mod: 59

## 2024-11-22 RX ORDER — TAMSULOSIN HYDROCHLORIDE 0.4 MG/1
0.4 CAPSULE ORAL DAILY
Refills: 0 | Status: ACTIVE | COMMUNITY

## 2024-11-22 RX ORDER — INSULIN GLARGINE 100 [IU]/ML
INJECTION, SOLUTION SUBCUTANEOUS
Refills: 0 | Status: ACTIVE | COMMUNITY

## 2024-11-22 RX ORDER — LEVOTHYROXINE SODIUM 0.15 MG/1
150 TABLET ORAL DAILY
Refills: 0 | Status: ACTIVE | COMMUNITY

## 2025-01-28 ENCOUNTER — APPOINTMENT (OUTPATIENT)
Dept: PULMONOLOGY | Facility: CLINIC | Age: 80
End: 2025-01-28
Payer: MEDICARE

## 2025-01-28 VITALS
BODY MASS INDEX: 27.78 KG/M2 | SYSTOLIC BLOOD PRESSURE: 106 MMHG | DIASTOLIC BLOOD PRESSURE: 72 MMHG | WEIGHT: 177 LBS | HEART RATE: 90 BPM | HEIGHT: 67 IN | OXYGEN SATURATION: 98 %

## 2025-01-28 DIAGNOSIS — Z87.891 PERSONAL HISTORY OF NICOTINE DEPENDENCE: ICD-10-CM

## 2025-01-28 DIAGNOSIS — R06.02 SHORTNESS OF BREATH: ICD-10-CM

## 2025-01-28 DIAGNOSIS — I25.10 ATHEROSCLEROTIC HEART DISEASE OF NATIVE CORONARY ARTERY W/OUT ANGINA PECTORIS: ICD-10-CM

## 2025-01-28 DIAGNOSIS — R63.4 ABNORMAL WEIGHT LOSS: ICD-10-CM

## 2025-01-28 PROCEDURE — 71046 X-RAY EXAM CHEST 2 VIEWS: CPT

## 2025-01-28 PROCEDURE — 99214 OFFICE O/P EST MOD 30 MIN: CPT | Mod: 25

## 2025-01-31 ENCOUNTER — APPOINTMENT (OUTPATIENT)
Dept: ELECTROPHYSIOLOGY | Facility: CLINIC | Age: 80
End: 2025-01-31

## 2025-01-31 ENCOUNTER — NON-APPOINTMENT (OUTPATIENT)
Age: 80
End: 2025-01-31

## 2025-01-31 VITALS
HEIGHT: 66 IN | SYSTOLIC BLOOD PRESSURE: 139 MMHG | BODY MASS INDEX: 28.93 KG/M2 | DIASTOLIC BLOOD PRESSURE: 84 MMHG | HEART RATE: 106 BPM | WEIGHT: 180 LBS | TEMPERATURE: 97.1 F

## 2025-01-31 DIAGNOSIS — I48.0 PAROXYSMAL ATRIAL FIBRILLATION: ICD-10-CM

## 2025-01-31 DIAGNOSIS — I50.22 CHRONIC SYSTOLIC (CONGESTIVE) HEART FAILURE: ICD-10-CM

## 2025-01-31 PROCEDURE — 93284 PRGRMG EVAL IMPLANTABLE DFB: CPT

## 2025-01-31 PROCEDURE — 99214 OFFICE O/P EST MOD 30 MIN: CPT

## 2025-01-31 PROCEDURE — G2211 COMPLEX E/M VISIT ADD ON: CPT

## 2025-01-31 PROCEDURE — 93290 INTERROG DEV EVAL ICPMS IP: CPT

## 2025-01-31 RX ORDER — SACUBITRIL AND VALSARTAN 24; 26 MG/1; MG/1
24-26 TABLET, FILM COATED ORAL TWICE DAILY
Refills: 0 | Status: ACTIVE | COMMUNITY

## 2025-02-13 ENCOUNTER — NON-APPOINTMENT (OUTPATIENT)
Age: 80
End: 2025-02-13

## 2025-02-24 ENCOUNTER — APPOINTMENT (OUTPATIENT)
Dept: CARDIOLOGY | Facility: CLINIC | Age: 80
End: 2025-02-24

## 2025-02-28 ENCOUNTER — RESULT REVIEW (OUTPATIENT)
Age: 80
End: 2025-02-28

## 2025-02-28 ENCOUNTER — OUTPATIENT (OUTPATIENT)
Dept: OUTPATIENT SERVICES | Facility: HOSPITAL | Age: 80
LOS: 1 days | End: 2025-02-28
Payer: MEDICARE

## 2025-02-28 DIAGNOSIS — Z00.8 ENCOUNTER FOR OTHER GENERAL EXAMINATION: ICD-10-CM

## 2025-02-28 DIAGNOSIS — R06.02 SHORTNESS OF BREATH: ICD-10-CM

## 2025-02-28 DIAGNOSIS — Z95.1 PRESENCE OF AORTOCORONARY BYPASS GRAFT: Chronic | ICD-10-CM

## 2025-02-28 DIAGNOSIS — R63.4 ABNORMAL WEIGHT LOSS: ICD-10-CM

## 2025-02-28 DIAGNOSIS — Z95.0 PRESENCE OF CARDIAC PACEMAKER: Chronic | ICD-10-CM

## 2025-02-28 DIAGNOSIS — Z95.810 PRESENCE OF AUTOMATIC (IMPLANTABLE) CARDIAC DEFIBRILLATOR: Chronic | ICD-10-CM

## 2025-02-28 PROCEDURE — 71250 CT THORAX DX C-: CPT

## 2025-02-28 PROCEDURE — 71250 CT THORAX DX C-: CPT | Mod: 26

## 2025-03-01 DIAGNOSIS — R06.02 SHORTNESS OF BREATH: ICD-10-CM

## 2025-03-01 DIAGNOSIS — R63.4 ABNORMAL WEIGHT LOSS: ICD-10-CM

## 2025-03-21 ENCOUNTER — APPOINTMENT (OUTPATIENT)
Dept: ELECTROPHYSIOLOGY | Facility: CLINIC | Age: 80
End: 2025-03-21

## 2025-03-21 VITALS
BODY MASS INDEX: 30.82 KG/M2 | HEART RATE: 97 BPM | TEMPERATURE: 97.1 F | WEIGHT: 185 LBS | HEIGHT: 65 IN | SYSTOLIC BLOOD PRESSURE: 133 MMHG | DIASTOLIC BLOOD PRESSURE: 73 MMHG

## 2025-03-21 DIAGNOSIS — I50.22 CHRONIC SYSTOLIC (CONGESTIVE) HEART FAILURE: ICD-10-CM

## 2025-03-21 DIAGNOSIS — I48.91 UNSPECIFIED ATRIAL FIBRILLATION: ICD-10-CM

## 2025-03-21 PROCEDURE — 93284 PRGRMG EVAL IMPLANTABLE DFB: CPT

## 2025-03-21 PROCEDURE — G2211 COMPLEX E/M VISIT ADD ON: CPT

## 2025-03-21 PROCEDURE — 99214 OFFICE O/P EST MOD 30 MIN: CPT | Mod: 25

## 2025-03-21 PROCEDURE — 93290 INTERROG DEV EVAL ICPMS IP: CPT

## 2025-03-21 RX ORDER — DUPILUMAB 300 MG/2ML
300 INJECTION, SOLUTION SUBCUTANEOUS
Refills: 0 | Status: ACTIVE | COMMUNITY

## 2025-04-02 ENCOUNTER — OUTPATIENT (OUTPATIENT)
Dept: OUTPATIENT SERVICES | Facility: HOSPITAL | Age: 80
LOS: 1 days | End: 2025-04-02
Payer: MEDICARE

## 2025-04-02 VITALS
HEIGHT: 65 IN | OXYGEN SATURATION: 99 % | TEMPERATURE: 98 F | WEIGHT: 184.09 LBS | DIASTOLIC BLOOD PRESSURE: 73 MMHG | SYSTOLIC BLOOD PRESSURE: 114 MMHG | HEART RATE: 79 BPM | RESPIRATION RATE: 17 BRPM

## 2025-04-02 DIAGNOSIS — Z95.0 PRESENCE OF CARDIAC PACEMAKER: Chronic | ICD-10-CM

## 2025-04-02 DIAGNOSIS — T82.111D BREAKDOWN (MECHANICAL) OF CARDIAC PULSE GENERATOR (BATTERY), SUBSEQUENT ENCOUNTER: ICD-10-CM

## 2025-04-02 DIAGNOSIS — Z01.818 ENCOUNTER FOR OTHER PREPROCEDURAL EXAMINATION: ICD-10-CM

## 2025-04-02 DIAGNOSIS — Z95.810 PRESENCE OF AUTOMATIC (IMPLANTABLE) CARDIAC DEFIBRILLATOR: Chronic | ICD-10-CM

## 2025-04-02 DIAGNOSIS — Z98.890 OTHER SPECIFIED POSTPROCEDURAL STATES: Chronic | ICD-10-CM

## 2025-04-02 DIAGNOSIS — Z95.1 PRESENCE OF AORTOCORONARY BYPASS GRAFT: Chronic | ICD-10-CM

## 2025-04-02 DIAGNOSIS — T82.111A BREAKDOWN (MECHANICAL) OF CARDIAC PULSE GENERATOR (BATTERY), INITIAL ENCOUNTER: ICD-10-CM

## 2025-04-02 LAB
ALBUMIN SERPL ELPH-MCNC: 3.6 G/DL — SIGNIFICANT CHANGE UP (ref 3.5–5.2)
ALP SERPL-CCNC: 64 U/L — SIGNIFICANT CHANGE UP (ref 30–115)
ALT FLD-CCNC: 11 U/L — SIGNIFICANT CHANGE UP (ref 0–41)
ANION GAP SERPL CALC-SCNC: 12 MMOL/L — SIGNIFICANT CHANGE UP (ref 7–14)
APPEARANCE UR: CLEAR — SIGNIFICANT CHANGE UP
AST SERPL-CCNC: 20 U/L — SIGNIFICANT CHANGE UP (ref 0–41)
BACTERIA # UR AUTO: NEGATIVE /HPF — SIGNIFICANT CHANGE UP
BASOPHILS # BLD AUTO: 0.03 K/UL — SIGNIFICANT CHANGE UP (ref 0–0.2)
BASOPHILS NFR BLD AUTO: 0.4 % — SIGNIFICANT CHANGE UP (ref 0–1)
BILIRUB SERPL-MCNC: 0.5 MG/DL — SIGNIFICANT CHANGE UP (ref 0.2–1.2)
BILIRUB UR-MCNC: NEGATIVE — SIGNIFICANT CHANGE UP
BLD GP AB SCN SERPL QL: SIGNIFICANT CHANGE UP
BUN SERPL-MCNC: 45 MG/DL — HIGH (ref 10–20)
CALCIUM SERPL-MCNC: 9 MG/DL — SIGNIFICANT CHANGE UP (ref 8.4–10.5)
CAST: 1 /LPF — SIGNIFICANT CHANGE UP (ref 0–4)
CHLORIDE SERPL-SCNC: 106 MMOL/L — SIGNIFICANT CHANGE UP (ref 98–110)
CO2 SERPL-SCNC: 25 MMOL/L — SIGNIFICANT CHANGE UP (ref 17–32)
COLOR SPEC: YELLOW — SIGNIFICANT CHANGE UP
CREAT SERPL-MCNC: 1.9 MG/DL — HIGH (ref 0.7–1.5)
DIFF PNL FLD: NEGATIVE — SIGNIFICANT CHANGE UP
EGFR: 36 ML/MIN/1.73M2 — LOW
EGFR: 36 ML/MIN/1.73M2 — LOW
EOSINOPHIL # BLD AUTO: 0.33 K/UL — SIGNIFICANT CHANGE UP (ref 0–0.7)
EOSINOPHIL NFR BLD AUTO: 4.6 % — SIGNIFICANT CHANGE UP (ref 0–8)
GLUCOSE SERPL-MCNC: 135 MG/DL — HIGH (ref 70–99)
GLUCOSE UR QL: >=1000 MG/DL
HCT VFR BLD CALC: 38 % — LOW (ref 42–52)
HGB BLD-MCNC: 12 G/DL — LOW (ref 14–18)
IMM GRANULOCYTES NFR BLD AUTO: 0.3 % — SIGNIFICANT CHANGE UP (ref 0.1–0.3)
KETONES UR-MCNC: NEGATIVE MG/DL — SIGNIFICANT CHANGE UP
LEUKOCYTE ESTERASE UR-ACNC: NEGATIVE — SIGNIFICANT CHANGE UP
LYMPHOCYTES # BLD AUTO: 1.67 K/UL — SIGNIFICANT CHANGE UP (ref 1.2–3.4)
LYMPHOCYTES # BLD AUTO: 23.4 % — SIGNIFICANT CHANGE UP (ref 20.5–51.1)
MCHC RBC-ENTMCNC: 28.1 PG — SIGNIFICANT CHANGE UP (ref 27–31)
MCHC RBC-ENTMCNC: 31.6 G/DL — LOW (ref 32–37)
MCV RBC AUTO: 89 FL — SIGNIFICANT CHANGE UP (ref 80–94)
MONOCYTES # BLD AUTO: 0.52 K/UL — SIGNIFICANT CHANGE UP (ref 0.1–0.6)
MONOCYTES NFR BLD AUTO: 7.3 % — SIGNIFICANT CHANGE UP (ref 1.7–9.3)
MRSA PCR RESULT.: NEGATIVE — SIGNIFICANT CHANGE UP
NEUTROPHILS # BLD AUTO: 4.58 K/UL — SIGNIFICANT CHANGE UP (ref 1.4–6.5)
NEUTROPHILS NFR BLD AUTO: 64 % — SIGNIFICANT CHANGE UP (ref 42.2–75.2)
NITRITE UR-MCNC: NEGATIVE — SIGNIFICANT CHANGE UP
NRBC BLD AUTO-RTO: 0 /100 WBCS — SIGNIFICANT CHANGE UP (ref 0–0)
PH UR: 6 — SIGNIFICANT CHANGE UP (ref 5–8)
PLATELET # BLD AUTO: 251 K/UL — SIGNIFICANT CHANGE UP (ref 130–400)
PMV BLD: 11.9 FL — HIGH (ref 7.4–10.4)
POTASSIUM SERPL-MCNC: 4.2 MMOL/L — SIGNIFICANT CHANGE UP (ref 3.5–5)
POTASSIUM SERPL-SCNC: 4.2 MMOL/L — SIGNIFICANT CHANGE UP (ref 3.5–5)
PROT SERPL-MCNC: 6 G/DL — SIGNIFICANT CHANGE UP (ref 6–8)
PROT UR-MCNC: 30 MG/DL
RBC # BLD: 4.27 M/UL — LOW (ref 4.7–6.1)
RBC # FLD: 15.4 % — HIGH (ref 11.5–14.5)
RBC CASTS # UR COMP ASSIST: 0 /HPF — SIGNIFICANT CHANGE UP (ref 0–4)
SODIUM SERPL-SCNC: 143 MMOL/L — SIGNIFICANT CHANGE UP (ref 135–146)
SP GR SPEC: 1.02 — SIGNIFICANT CHANGE UP (ref 1–1.03)
SQUAMOUS # UR AUTO: 0 /HPF — SIGNIFICANT CHANGE UP (ref 0–5)
UROBILINOGEN FLD QL: 0.2 MG/DL — SIGNIFICANT CHANGE UP (ref 0.2–1)
WBC # BLD: 7.15 K/UL — SIGNIFICANT CHANGE UP (ref 4.8–10.8)
WBC # FLD AUTO: 7.15 K/UL — SIGNIFICANT CHANGE UP (ref 4.8–10.8)
WBC UR QL: 0 /HPF — SIGNIFICANT CHANGE UP (ref 0–5)

## 2025-04-02 PROCEDURE — 86901 BLOOD TYPING SEROLOGIC RH(D): CPT

## 2025-04-02 PROCEDURE — 85025 COMPLETE CBC W/AUTO DIFF WBC: CPT

## 2025-04-02 PROCEDURE — 36415 COLL VENOUS BLD VENIPUNCTURE: CPT

## 2025-04-02 PROCEDURE — 86900 BLOOD TYPING SEROLOGIC ABO: CPT

## 2025-04-02 PROCEDURE — 87086 URINE CULTURE/COLONY COUNT: CPT

## 2025-04-02 PROCEDURE — 81001 URINALYSIS AUTO W/SCOPE: CPT

## 2025-04-02 PROCEDURE — 93010 ELECTROCARDIOGRAM REPORT: CPT

## 2025-04-02 PROCEDURE — 87640 STAPH A DNA AMP PROBE: CPT

## 2025-04-02 PROCEDURE — 87641 MR-STAPH DNA AMP PROBE: CPT

## 2025-04-02 PROCEDURE — 80053 COMPREHEN METABOLIC PANEL: CPT

## 2025-04-02 PROCEDURE — 93005 ELECTROCARDIOGRAM TRACING: CPT

## 2025-04-02 PROCEDURE — 86850 RBC ANTIBODY SCREEN: CPT

## 2025-04-02 PROCEDURE — 99214 OFFICE O/P EST MOD 30 MIN: CPT | Mod: 25

## 2025-04-02 NOTE — H&P PST ADULT - NSICDXPASTSURGICALHX_GEN_ALL_CORE_FT
PAST SURGICAL HISTORY:  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     History of back surgery     S/P CABG x 6

## 2025-04-02 NOTE — H&P PST ADULT - NS PRO AD NO ADVANCE DIRECTIVE
Submitted PA for [employer] on CoverMyMeds.     APPROVAL received right away, 12/26/17 through 1/25/2019. CaseId:[id number]    Pharmacy notified.    Kassi Phipps MA   January 25, 2018,  10:38 AM     No

## 2025-04-02 NOTE — H&P PST ADULT - MUSCULOSKELETAL
uses cane/ROM intact/strength 5/5 bilateral upper extremities/strength 5/5 bilateral lower extremities/extremities exam/abnormal gait

## 2025-04-02 NOTE — H&P PST ADULT - NSICDXPASTMEDICALHX_GEN_ALL_CORE_FT
PAST MEDICAL HISTORY:  Coronary artery disease with other form of angina pectoris     H/O pulmonary edema     HFrEF (heart failure with reduced ejection fraction)     High cholesterol     Hypertension, unspecified type     Hypothyroidism, unspecified type     On supplemental oxygen by nasal cannula     Pleural effusion due to congestive heart failure     Type 2 diabetes mellitus with other neurologic complication, with long-term current use of insulin

## 2025-04-02 NOTE — H&P PST ADULT - REASON FOR ADMISSION
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 He went for a device interrogation, and was found to have the battery was low.

## 2025-04-03 DIAGNOSIS — T82.111D BREAKDOWN (MECHANICAL) OF CARDIAC PULSE GENERATOR (BATTERY), SUBSEQUENT ENCOUNTER: ICD-10-CM

## 2025-04-03 DIAGNOSIS — Z01.818 ENCOUNTER FOR OTHER PREPROCEDURAL EXAMINATION: ICD-10-CM

## 2025-04-03 LAB
CULTURE RESULTS: SIGNIFICANT CHANGE UP
SPECIMEN SOURCE: SIGNIFICANT CHANGE UP

## 2025-04-09 ENCOUNTER — APPOINTMENT (OUTPATIENT)
Dept: PULMONOLOGY | Facility: CLINIC | Age: 80
End: 2025-04-09
Payer: MEDICARE

## 2025-04-09 VITALS
DIASTOLIC BLOOD PRESSURE: 60 MMHG | WEIGHT: 186 LBS | HEART RATE: 98 BPM | RESPIRATION RATE: 14 BRPM | SYSTOLIC BLOOD PRESSURE: 118 MMHG | OXYGEN SATURATION: 98 % | BODY MASS INDEX: 30.95 KG/M2

## 2025-04-09 DIAGNOSIS — J90 PLEURAL EFFUSION, NOT ELSEWHERE CLASSIFIED: ICD-10-CM

## 2025-04-09 DIAGNOSIS — R06.02 SHORTNESS OF BREATH: ICD-10-CM

## 2025-04-09 PROBLEM — Z87.09 PERSONAL HISTORY OF OTHER DISEASES OF THE RESPIRATORY SYSTEM: Chronic | Status: ACTIVE | Noted: 2025-04-02

## 2025-04-09 PROCEDURE — G2211 COMPLEX E/M VISIT ADD ON: CPT

## 2025-04-09 PROCEDURE — 99214 OFFICE O/P EST MOD 30 MIN: CPT

## 2025-04-16 ENCOUNTER — OUTPATIENT (OUTPATIENT)
Dept: OUTPATIENT SERVICES | Facility: HOSPITAL | Age: 80
LOS: 1 days | Discharge: HOME | End: 2025-04-16
Payer: MEDICARE

## 2025-04-16 ENCOUNTER — APPOINTMENT (OUTPATIENT)
Dept: ELECTROPHYSIOLOGY | Facility: HOSPITAL | Age: 80
End: 2025-04-16

## 2025-04-16 ENCOUNTER — TRANSCRIPTION ENCOUNTER (OUTPATIENT)
Age: 80
End: 2025-04-16

## 2025-04-16 VITALS
RESPIRATION RATE: 20 BRPM | DIASTOLIC BLOOD PRESSURE: 74 MMHG | SYSTOLIC BLOOD PRESSURE: 140 MMHG | HEART RATE: 75 BPM | OXYGEN SATURATION: 100 %

## 2025-04-16 VITALS
TEMPERATURE: 98 F | SYSTOLIC BLOOD PRESSURE: 148 MMHG | WEIGHT: 182.98 LBS | HEART RATE: 84 BPM | DIASTOLIC BLOOD PRESSURE: 79 MMHG | OXYGEN SATURATION: 99 % | HEIGHT: 66 IN

## 2025-04-16 DIAGNOSIS — Z95.810 PRESENCE OF AUTOMATIC (IMPLANTABLE) CARDIAC DEFIBRILLATOR: Chronic | ICD-10-CM

## 2025-04-16 DIAGNOSIS — Z95.1 PRESENCE OF AORTOCORONARY BYPASS GRAFT: Chronic | ICD-10-CM

## 2025-04-16 DIAGNOSIS — T82.111A BREAKDOWN (MECHANICAL) OF CARDIAC PULSE GENERATOR (BATTERY), INITIAL ENCOUNTER: ICD-10-CM

## 2025-04-16 DIAGNOSIS — Z98.890 OTHER SPECIFIED POSTPROCEDURAL STATES: Chronic | ICD-10-CM

## 2025-04-16 LAB
GLUCOSE BLDC GLUCOMTR-MCNC: 85 MG/DL — SIGNIFICANT CHANGE UP (ref 70–99)
GLUCOSE BLDC GLUCOMTR-MCNC: 95 MG/DL — SIGNIFICANT CHANGE UP (ref 70–99)

## 2025-04-16 PROCEDURE — 33264 RMVL & RPLCMT DFB GEN MLT LD: CPT

## 2025-04-16 PROCEDURE — 82962 GLUCOSE BLOOD TEST: CPT

## 2025-04-16 PROCEDURE — C1889: CPT

## 2025-04-16 PROCEDURE — C1882: CPT

## 2025-04-16 PROCEDURE — 99232 SBSQ HOSP IP/OBS MODERATE 35: CPT

## 2025-04-16 RX ORDER — CEPHALEXIN 250 MG/1
1 CAPSULE ORAL
Qty: 10 | Refills: 0
Start: 2025-04-16 | End: 2025-04-20

## 2025-04-16 RX ORDER — CEFAZOLIN SODIUM IN 0.9 % NACL 3 G/100 ML
1000 INTRAVENOUS SOLUTION, PIGGYBACK (ML) INTRAVENOUS ONCE
Refills: 0 | Status: COMPLETED | OUTPATIENT
Start: 2025-04-16 | End: 2025-04-16

## 2025-04-16 RX ORDER — CEFAZOLIN SODIUM IN 0.9 % NACL 3 G/100 ML
2000 INTRAVENOUS SOLUTION, PIGGYBACK (ML) INTRAVENOUS ONCE
Refills: 0 | Status: COMPLETED | OUTPATIENT
Start: 2025-04-16 | End: 2025-04-16

## 2025-04-16 RX ORDER — SACUBITRIL AND VALSARTAN 6; 6 MG/1; MG/1
1 PELLET ORAL
Refills: 0 | DISCHARGE

## 2025-04-16 RX ORDER — METOPROLOL SUCCINATE 50 MG/1
1 TABLET, EXTENDED RELEASE ORAL
Refills: 0 | DISCHARGE

## 2025-04-16 RX ORDER — DAPAGLIFLOZIN 5 MG/1
1 TABLET, FILM COATED ORAL
Refills: 0 | DISCHARGE

## 2025-04-16 RX ORDER — DUPILUMAB 200 MG/1.14ML
300 INJECTION, SOLUTION SUBCUTANEOUS
Refills: 0 | DISCHARGE

## 2025-04-16 RX ADMIN — Medication 100 MILLIGRAM(S): at 16:57

## 2025-04-16 RX ADMIN — Medication 100 MILLIGRAM(S): at 16:14

## 2025-04-16 NOTE — DISCHARGE NOTE PROVIDER - CARE PROVIDERS DIRECT ADDRESSES
,[email or web address]

## 2025-04-16 NOTE — DISCHARGE NOTE PROVIDER - HOSPITAL COURSE
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 He went for a device interrogation, and was found to have the battery was low.    PATIENT DENIES CHEST PAIN, PALPITATIONS, COUGHING, FEVER, DYSURIA.    + SHORTNESS OF BREATH AT REST AND WITH EXERTION.    NO COUGH, FEVER, SORE THROAT, HEADACHE, LOSS OF TASTE OR SMELL. NO KNOWN EXPOSURE TO ANYONE WITH COVID. PATIENT WAS INSTRUCTED TO ISOLATE FROM NOW UNTIL THE SURGERY. 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 He went for a device interrogation, and was found to have the battery was low.    PATIENT DENIES CHEST PAIN, PALPITATIONS, COUGHING, FEVER, DYSURIA.    + SHORTNESS OF BREATH AT REST AND WITH EXERTION.    NO COUGH, FEVER, SORE THROAT, HEADACHE, LOSS OF TASTE OR SMELL. NO KNOWN EXPOSURE TO ANYONE WITH COVID. PATIENT WAS INSTRUCTED TO ISOLATE FROM NOW UNTIL THE SURGERY.    Underwent pulse generator exchange today without any complications. patient is clinically and HD ready to be Dc  f/u with Dr Camara in 3 to 4 weeks

## 2025-04-16 NOTE — ASU DISCHARGE PLAN (ADULT/PEDIATRIC) - CARE PROVIDER_API CALL
Petar Camara  Cardiac Electrophysiology  [address] [place] [address], [address]  [place] [address]  Phone: [phone]  Fax: [phone]  Follow Up Time: 1 month

## 2025-04-16 NOTE — ASU DISCHARGE PLAN (ADULT/PEDIATRIC) - FINANCIAL ASSISTANCE
[facility] provides services at a reduced cost to those who are determined to be eligible through [facility]’s financial assistance program. Information regarding [facility]’s financial assistance program can be found by going to [email or web address] or by calling [phone].

## 2025-04-16 NOTE — DISCHARGE NOTE PROVIDER - NSDCMRMEDTOKEN_GEN_ALL_CORE_FT
apixaban 5 mg oral tablet: 1 tab(s) orally every 12 hours  atorvastatin 40 mg oral tablet: 1 tab(s) orally once a day (at bedtime)  bumetanide 1 mg oral tablet: 1 tab(s) orally 2 times a day  cephalexin 500 mg oral tablet: 1 tab(s) orally 2 times a day  dapagliflozin 10 mg oral tablet: 1 tab(s) orally  Dupixent Pre-filled Pen 300 mg/2 mL subcutaneous solution: 300 milligram(s) subcutaneously every 2 weeks  Entresto 24 mg-26 mg oral tablet: 1 tab(s) orally 2 times a day  fenofibrate 160 mg oral tablet: 1 tab(s) orally once a day  Flomax 0.4 mg oral capsule: 1 cap(s) orally once a day  Lantus: 12 unit(s) subcutaneous once a day (at bedtime)  levothyroxine 175 mcg (0.175 mg) oral tablet: 1 tab(s) orally once a day  metoprolol succinate 50 mg oral capsule, extended release: 1 cap(s) orally once a day  mycophenolate mofetil 200 mg/mL oral suspension: 2.5 milliliter(s) orally 2 times a day  omeprazole 40 mg oral delayed release capsule: 1 cap(s) orally once a day  Ranexa 500 mg oral tablet, extended release: 1 tab(s) orally 2 times a day  Verquvo 2.5 mg oral tablet: 1 tab(s) orally once a day

## 2025-04-16 NOTE — DISCHARGE NOTE PROVIDER - NSDCCPCAREPLAN_GEN_ALL_CORE_FT
PRINCIPAL DISCHARGE DIAGNOSIS  Diagnosis: Chronic systolic congestive heart failure  Assessment and Plan of Treatment:       SECONDARY DISCHARGE DIAGNOSES  Diagnosis: Presence of biventricular AICD  Assessment and Plan of Treatment:     Diagnosis: DM (diabetes mellitus)  Assessment and Plan of Treatment:     Diagnosis: CAD (coronary artery disease)  Assessment and Plan of Treatment:     Diagnosis: Chronic atrial fibrillation  Assessment and Plan of Treatment: RESUME ELIQUIS ON SUNDAY 4/20/2025

## 2025-04-16 NOTE — PROGRESS NOTE ADULT - SUBJECTIVE AND OBJECTIVE BOX
Electrophysiology Brief Post-Op Note      PRE-OP DIAGNOSIS: chf    POST-OP DIAGNOSIS: chf    PROCEDURE: generator change     Vendor Representative was present for clinical support.    Physician: Hoa  Assistant: None    ESTIMATED BLOOD LOSS:  10     mL    ANESTHESIA TYPE:  [  ]General Anesthesia  [X  ] Sedation  [X  ] Local/Regional    CONDITION  [  ] Critical  [  ] Serious  [  ]Fair  [ X ]Good      SPECIMENS REMOVED (IF APPLICABLE):  icd    IMPLANTS (IF APPLICABLE)  icd    FINDINGS: none    COMPLICATIONS: none     PLAN OF CARE  - kefelx  - FU 3-4 weeks

## 2025-04-16 NOTE — PROGRESS NOTE ADULT - SUBJECTIVE AND OBJECTIVE BOX
I have personally seen and examined the patient.  I agree with the history and physical which I have reviewed and noted any changes below.

## 2025-04-16 NOTE — DISCHARGE NOTE PROVIDER - CARE PROVIDER_API CALL
Petar Camara  Cardiac Electrophysiology  [address] [place] [address], [address]  [place] [address]  Phone: [phone]  Fax: [phone]  Follow Up Time: 1 month

## 2025-04-16 NOTE — DISCHARGE NOTE PROVIDER - NSDCCPTREATMENT_GEN_ALL_CORE_FT
PRINCIPAL PROCEDURE  Procedure: Replacement, AICD pulse generator, biventricular  Findings and Treatment: please continue all your home medications  resume eliquis on Sunday 4/20/2025

## 2025-04-16 NOTE — DISCHARGE NOTE PROVIDER - NSDCFUSCHEDAPPT_GEN_ALL_CORE_FT
Lucero Chau  [facility] Physician [facility]  ELECTROPH [address]  Scheduled Appointment: 05/16/2025    [facility]  CARDIOLOGY [address]  Scheduled Appointment: 06/13/2025    Liborio Madrigal  [facility]  PULMMED [address]  Scheduled Appointment: 07/09/2025

## 2025-04-16 NOTE — DISCHARGE NOTE PROVIDER - NSDCCAREPROVSEEN_GEN_ALL_CORE_FT
Pressure dressing to be removed tomorrow in 24 hours  Steri strips to remain in place and fall off by itself in about a week  no submerging under water for about 2 weeks to 4 weeks  Do not wet site for 5 days

## 2025-04-21 DIAGNOSIS — T82.111A BREAKDOWN (MECHANICAL) OF CARDIAC PULSE GENERATOR (BATTERY), INITIAL ENCOUNTER: ICD-10-CM
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
## 2025-05-14 NOTE — H&P PST ADULT - HISTORY OF PRESENT ILLNESS
I reviewed the H&P, I examined the patient, and there are no changes in the patient's condition.    CV: RRR, S1S2  Pulm : CTA bilaterally   PATIENT DENIES CHEST PAIN, PALPITATIONS, COUGHING, FEVER, DYSURIA.    + SHORTNESS OF BREATH AT REST AND WITH EXERTION.    NO COUGH, FEVER, SORE THROAT, HEADACHE, LOSS OF TASTE OR SMELL. NO KNOWN EXPOSURE TO ANYONE WITH COVID. PATIENT WAS INSTRUCTED TO ISOLATE FROM NOW UNTIL THE SURGERY.    Anesthesia Alert  NO--Difficult Airway  NO--History of neck surgery or radiation  NO--Limited ROM of neck  NO--History of Malignant hyperthermia  NO--Personal or family history of Pseudocholinesterase deficiency  NO--Prior Anesthesia Complication  NO--Latex Allergy  NO--Loose teeth  NO--History of Rheumatoid Arthritis  NO--CHRISTOPH  + bleeding risk (on eliquis)  AICD ([facility])

## 2025-05-16 ENCOUNTER — APPOINTMENT (OUTPATIENT)
Dept: ELECTROPHYSIOLOGY | Facility: CLINIC | Age: 80
End: 2025-05-16

## 2025-05-16 VITALS
HEART RATE: 96 BPM | HEIGHT: 66 IN | WEIGHT: 183 LBS | BODY MASS INDEX: 29.41 KG/M2 | DIASTOLIC BLOOD PRESSURE: 76 MMHG | SYSTOLIC BLOOD PRESSURE: 126 MMHG

## 2025-05-16 DIAGNOSIS — I48.91 UNSPECIFIED ATRIAL FIBRILLATION: ICD-10-CM

## 2025-05-16 DIAGNOSIS — I50.22 CHRONIC SYSTOLIC (CONGESTIVE) HEART FAILURE: ICD-10-CM

## 2025-05-16 PROCEDURE — G2211 COMPLEX E/M VISIT ADD ON: CPT

## 2025-05-16 PROCEDURE — 93284 PRGRMG EVAL IMPLANTABLE DFB: CPT

## 2025-05-16 PROCEDURE — 93290 INTERROG DEV EVAL ICPMS IP: CPT

## 2025-05-16 PROCEDURE — 99214 OFFICE O/P EST MOD 30 MIN: CPT | Mod: 24

## 2025-05-16 RX ORDER — TAMSULOSIN HYDROCHLORIDE 0.4 MG/1
0.4 CAPSULE ORAL DAILY
Refills: 0 | Status: ACTIVE | COMMUNITY

## 2025-05-16 RX ORDER — OMEPRAZOLE 40 MG/1
40 CAPSULE, DELAYED RELEASE ORAL DAILY
Refills: 0 | Status: ACTIVE | COMMUNITY

## 2025-05-16 RX ORDER — DUPILUMAB 300 MG/2ML
300 INJECTION, SOLUTION SUBCUTANEOUS
Refills: 0 | Status: ACTIVE | COMMUNITY

## 2025-05-16 RX ORDER — LEVOTHYROXINE SODIUM 150 UG/1
150 TABLET ORAL DAILY
Refills: 0 | Status: ACTIVE | COMMUNITY

## 2025-05-28 ENCOUNTER — APPOINTMENT (OUTPATIENT)
Dept: GASTROENTEROLOGY | Facility: CLINIC | Age: 80
End: 2025-05-28
Payer: MEDICARE

## 2025-05-28 VITALS
HEIGHT: 66 IN | SYSTOLIC BLOOD PRESSURE: 132 MMHG | HEART RATE: 86 BPM | BODY MASS INDEX: 29.57 KG/M2 | DIASTOLIC BLOOD PRESSURE: 88 MMHG | WEIGHT: 184 LBS

## 2025-05-28 DIAGNOSIS — R13.10 DYSPHAGIA, UNSPECIFIED: ICD-10-CM

## 2025-05-28 DIAGNOSIS — R07.9 CHEST PAIN, UNSPECIFIED: ICD-10-CM

## 2025-05-28 DIAGNOSIS — I21.9 ACUTE MYOCARDIAL INFARCTION, UNSPECIFIED: ICD-10-CM

## 2025-05-28 DIAGNOSIS — I50.9 HEART FAILURE, UNSPECIFIED: ICD-10-CM

## 2025-05-28 DIAGNOSIS — Z86.39 PERSONAL HISTORY OF OTHER ENDOCRINE, NUTRITIONAL AND METABOLIC DISEASE: ICD-10-CM

## 2025-05-28 DIAGNOSIS — E03.9 HYPOTHYROIDISM, UNSPECIFIED: ICD-10-CM

## 2025-05-28 DIAGNOSIS — K22.2 ESOPHAGEAL OBSTRUCTION: ICD-10-CM

## 2025-05-28 DIAGNOSIS — L10.9 PEMPHIGUS, UNSPECIFIED: ICD-10-CM

## 2025-05-28 DIAGNOSIS — I10 ESSENTIAL (PRIMARY) HYPERTENSION: ICD-10-CM

## 2025-05-28 DIAGNOSIS — Z87.891 PERSONAL HISTORY OF NICOTINE DEPENDENCE: ICD-10-CM

## 2025-05-28 DIAGNOSIS — J43.9 EMPHYSEMA, UNSPECIFIED: ICD-10-CM

## 2025-05-28 DIAGNOSIS — Z86.69 PERSONAL HISTORY OF OTHER DISEASES OF THE NERVOUS SYSTEM AND SENSE ORGANS: ICD-10-CM

## 2025-05-28 DIAGNOSIS — N28.1 CYST OF KIDNEY, ACQUIRED: ICD-10-CM

## 2025-05-28 PROCEDURE — 99204 OFFICE O/P NEW MOD 45 MIN: CPT

## 2025-07-09 ENCOUNTER — APPOINTMENT (OUTPATIENT)
Dept: PULMONOLOGY | Facility: CLINIC | Age: 80
End: 2025-07-09

## 2025-07-18 ENCOUNTER — APPOINTMENT (OUTPATIENT)
Dept: ELECTROPHYSIOLOGY | Facility: CLINIC | Age: 80
End: 2025-07-18

## 2025-07-18 ENCOUNTER — NON-APPOINTMENT (OUTPATIENT)
Age: 80
End: 2025-07-18

## 2025-07-18 VITALS
BODY MASS INDEX: 29.57 KG/M2 | DIASTOLIC BLOOD PRESSURE: 66 MMHG | SYSTOLIC BLOOD PRESSURE: 113 MMHG | WEIGHT: 184 LBS | HEIGHT: 66 IN

## 2025-07-18 PROCEDURE — 99214 OFFICE O/P EST MOD 30 MIN: CPT | Mod: 25

## 2025-07-18 PROCEDURE — 93290 INTERROG DEV EVAL ICPMS IP: CPT

## 2025-07-18 PROCEDURE — 93284 PRGRMG EVAL IMPLANTABLE DFB: CPT

## 2025-07-18 PROCEDURE — G2211 COMPLEX E/M VISIT ADD ON: CPT

## 2025-09-19 ENCOUNTER — APPOINTMENT (OUTPATIENT)
Dept: ELECTROPHYSIOLOGY | Facility: CLINIC | Age: 80
End: 2025-09-19
Payer: MEDICARE

## 2025-09-19 VITALS — DIASTOLIC BLOOD PRESSURE: 67 MMHG | HEART RATE: 80 BPM | SYSTOLIC BLOOD PRESSURE: 95 MMHG

## 2025-09-19 DIAGNOSIS — I50.22 CHRONIC SYSTOLIC (CONGESTIVE) HEART FAILURE: ICD-10-CM

## 2025-09-19 DIAGNOSIS — I48.0 PAROXYSMAL ATRIAL FIBRILLATION: ICD-10-CM

## 2025-09-19 PROCEDURE — 99214 OFFICE O/P EST MOD 30 MIN: CPT | Mod: 57

## 2025-09-19 PROCEDURE — 93290 INTERROG DEV EVAL ICPMS IP: CPT

## 2025-09-19 PROCEDURE — G2211 COMPLEX E/M VISIT ADD ON: CPT

## 2025-09-19 PROCEDURE — 93284 PRGRMG EVAL IMPLANTABLE DFB: CPT

## 2025-09-19 RX ORDER — LEVOTHYROXINE SODIUM 0.15 MG/1
150 TABLET ORAL DAILY
Refills: 0 | Status: ACTIVE | COMMUNITY

## 2025-09-19 RX ORDER — DUPILUMAB 300 MG/2ML
300 INJECTION, SOLUTION SUBCUTANEOUS
Refills: 0 | Status: ACTIVE | COMMUNITY